# Patient Record
Sex: MALE | Race: BLACK OR AFRICAN AMERICAN | NOT HISPANIC OR LATINO | Employment: OTHER | URBAN - METROPOLITAN AREA
[De-identification: names, ages, dates, MRNs, and addresses within clinical notes are randomized per-mention and may not be internally consistent; named-entity substitution may affect disease eponyms.]

---

## 2017-02-13 ENCOUNTER — OFFICE VISIT (OUTPATIENT)
Dept: NEUROSURGERY | Facility: CLINIC | Age: 57
End: 2017-02-13

## 2017-02-13 VITALS
DIASTOLIC BLOOD PRESSURE: 90 MMHG | HEIGHT: 69 IN | SYSTOLIC BLOOD PRESSURE: 118 MMHG | BODY MASS INDEX: 41.18 KG/M2 | WEIGHT: 278 LBS | TEMPERATURE: 98 F

## 2017-02-13 DIAGNOSIS — M51.36 DEGENERATIVE LUMBAR DISC: Primary | ICD-10-CM

## 2017-02-13 PROCEDURE — 99203 OFFICE O/P NEW LOW 30 MIN: CPT | Performed by: NEUROLOGICAL SURGERY

## 2017-02-13 RX ORDER — LISINOPRIL 20 MG/1
20 TABLET ORAL 2 TIMES DAILY
COMMUNITY
Start: 2016-11-13

## 2017-02-13 RX ORDER — METHOCARBAMOL 750 MG/1
750 TABLET, FILM COATED ORAL NIGHTLY
Qty: 30 TABLET | Refills: 0 | Status: SHIPPED | OUTPATIENT
Start: 2017-02-13 | End: 2017-03-15

## 2017-02-13 RX ORDER — OMEPRAZOLE 20 MG/1
20 CAPSULE, DELAYED RELEASE ORAL DAILY
COMMUNITY

## 2017-02-13 RX ORDER — HYDROCHLOROTHIAZIDE 25 MG/1
25 TABLET ORAL DAILY
COMMUNITY
Start: 2017-01-20

## 2017-02-13 RX ORDER — NABUMETONE 750 MG/1
750 TABLET, FILM COATED ORAL 2 TIMES DAILY
Qty: 60 TABLET | Refills: 0 | Status: SHIPPED | OUTPATIENT
Start: 2017-02-13 | End: 2017-03-20 | Stop reason: SDUPTHER

## 2017-02-13 NOTE — PATIENT INSTRUCTIONS
Call Dr. Figueroa on a Monday or Tuesday, and leave a message with Nancy (). Dr. Figueroa will call you back at the end of the day as soon as he can.

## 2017-02-13 NOTE — PROGRESS NOTES
Bonilla Sterling .  1960  [id number]      Chief Complaint   Patient presents with   • Back Pain     CT       HISTORY OF PRESENT ILLNESS:  [This is a 56-year-old male who presents with a 5-6 year history of lower lumbar pain.  The pain is predominantly axial and only rarely does he have any suggestion of radiculopathy.  He does not have neurogenic claudication.  He has been to chiropractic mobilization which helps considerably.  Physical therapy has not been of any benefit to him.  He has seen a neurosurgeon at [facility] who suggested he have a spinal fusion.  I'm seeing him for a second opinion.  Today he says his pain is been as good as it has been for years.  He has been virtually asymptomatic for the past 3 weeks.  The pain is accentuated as he is gained weight and increases his activity. ]    Past Medical History   Diagnosis Date   • Hypertension        Past Surgical History   Procedure Laterality Date   • Wrist fracture surgery         Family History   Problem Relation Age of Onset   • Diabetes Mother    • Cancer Mother    • Hypertension Mother    • Diabetes Father    • Hypertension Father        Social History     Social History   • Marital status:      Spouse name: N/A   • Number of children: N/A   • Years of education: N/A     Occupational History   • Not on file.     Social History Main Topics   • Smoking status: Former Smoker   • Smokeless tobacco: Not on file   • Alcohol use No   • Drug use: No   • Sexual activity: Defer     Other Topics Concern   • Not on file     Social History Narrative       No Known Allergies      Current Outpatient Prescriptions:   •  hydrochlorothiazide (HYDRODIURIL) 25 MG tablet, , Disp: , Rfl:   •  lisinopril (PRINIVIL,ZESTRIL) 20 MG tablet, , Disp: , Rfl:   •  omeprazole (priLOSEC) 20 MG capsule, Take 20 mg by mouth Daily., Disp: , Rfl:     Review of Systems   Constitutional: Negative for activity change, appetite change, chills, diaphoresis, fatigue,  fever and unexpected weight change.   HENT: Positive for sinus pressure. Negative for congestion, dental problem, drooling, ear discharge, ear pain, facial swelling, hearing loss, mouth sores, nosebleeds, postnasal drip, rhinorrhea, sneezing, sore throat, tinnitus, trouble swallowing and voice change.    Eyes: Negative for photophobia, pain, discharge, redness, itching and visual disturbance.   Respiratory: Negative for apnea, cough, choking, chest tightness, shortness of breath, wheezing and stridor.    Cardiovascular: Negative for chest pain, palpitations and leg swelling.   Gastrointestinal: Negative for abdominal distention, abdominal pain, anal bleeding, blood in stool, constipation, diarrhea, nausea, rectal pain and vomiting.   Endocrine: Negative for cold intolerance, heat intolerance, polydipsia, polyphagia and polyuria.   Genitourinary: Negative for decreased urine volume, difficulty urinating, dysuria, enuresis, flank pain, frequency, genital sores, hematuria and urgency.   Musculoskeletal: Positive for back pain. Negative for arthralgias, gait problem, joint swelling, myalgias, neck pain and neck stiffness.   Skin: Negative for color change, pallor, rash and wound.   Allergic/Immunologic: Negative for environmental allergies, food allergies and immunocompromised state.   Neurological: Positive for headaches. Negative for dizziness, tremors, seizures, syncope, facial asymmetry, speech difficulty, weakness, light-headedness and numbness.   Hematological: Negative for adenopathy. Does not bruise/bleed easily.   Psychiatric/Behavioral: Negative for agitation, behavioral problems, confusion, decreased concentration, dysphoric mood, hallucinations, self-injury, sleep disturbance and suicidal ideas. The patient is not nervous/anxious and is not hyperactive.    All other systems reviewed and are negative.      Neurological Examination:    Vitals:    02/13/17 1226   BP: 118/90   BP Location: Right arm   Patient  "Position: Sitting   Cuff Size: Adult   Temp: 98 °F (36.7 °C)   TempSrc: Temporal Artery    Weight: 278 lb (80.7 kg)   Height: 69\" (175.3 cm)       Mental status/speech: The patient is alert and oriented.  Speech is clear without aphysia or dysarthria.  No overt cognitive deficits.    Cranial nerve examination:    Olfaction: Smell is intact.  Vision: Vision is intact without visual field abnormalities.  Funduscopic examination is normal.  No pupillary irregularity.  Ocular motor examination: The extraocular muscles are intact.  There is no diplopia.  The pupil is round and reactive to both light and accommodation.  There is no nystagmus.  Facial movement/sensation: There is no facial weakness.  Sensation is intact in the first, second, and third divisions of the trigeminal nerve.  The corneal reflex is intact.  Auditory: Hearing is intact to finger rub bilaterally.  Cranial nerves IX, X, XI, XII: Phonation is normal.  No dysphagia.  Tongue is protruded in the midline without atrophy.  The gag reflex is intact.  Shoulder shrug is normal.    Musculoligamentous ligamentous examination: He is obese with limitation of range of motion.  Tray leg raising [place] and flip test are all negative.  I am unable to document any weakness, sensory loss or reflex asymmetry.  His gait is normal.    Medical Decision Making:     Diagnostic Data Set:  Lumbar MRI shows degenerative disc disease L4/5 with moderate lateral recess closure secondary to facet hypertrophy.  He has more impressive disc degeneration L5-S1.  Neuroforamen appear to be more patent at this level.      Assessment:  Degenerative osteoarthritis with lateral recess stenosis L4/5          Recommendations:  I'm unable to find indication for surgical intervention.  He has been told he needs a spinal fusion yet I find no indication for that.  The pain is axial and nonradicular.  He does not have persistent neurogenic claudication.  Therefore I do not think an operation is " going to be meaningful and provide the relief that he seeking.  This is been long-standing over the past 15 years therefore the I do not see a rush towards surgery because of the absence of neurogenic claudication and neurological dysfunction.  I have recommended that he continue with chiropractic mobilization.  I provided a prescription of Relafen 750 mg twice a day and Robaxin 750 mg at night.  I've asked him to call me in 2-3 weeks.  At that time if he is not better I would recommend facet block or epidural steroid injection.  Obviously weight reduction will be key and pivotal to his improvement.  I will follow through with this if he wishes and if I can help further do not hesitate to contact me        I greatly appreciate the opportunity to see and evaluate this individual.  If you have questions or concerns regarding issues that I may have overlooked please call me at any time: [phone].  Shaji Figueroa M.D.  [facility]  [address] [place] [address]    Scribed for Lloyd Figueroa MD by Gerard Oden CMA. 2/13/2017  12:56 PM       I have read and concur with the information provided by the scribe.  Lloyd Figueroa MD

## 2017-03-20 DIAGNOSIS — M51.36 DEGENERATIVE LUMBAR DISC: ICD-10-CM

## 2017-03-20 RX ORDER — NABUMETONE 750 MG/1
TABLET, FILM COATED ORAL
Qty: 60 TABLET | Refills: 0 | OUTPATIENT
Start: 2017-03-20

## 2017-03-20 RX ORDER — METHOCARBAMOL 750 MG/1
750 TABLET, FILM COATED ORAL NIGHTLY
Qty: 30 TABLET | Refills: 0 | Status: SHIPPED | OUTPATIENT
Start: 2017-03-20 | End: 2017-04-17 | Stop reason: SDUPTHER

## 2017-03-20 RX ORDER — NABUMETONE 750 MG/1
750 TABLET, FILM COATED ORAL 2 TIMES DAILY
Qty: 60 TABLET | Refills: 0 | Status: SHIPPED | OUTPATIENT
Start: 2017-03-20 | End: 2017-04-17 | Stop reason: SDUPTHER

## 2017-03-20 NOTE — TELEPHONE ENCOUNTER
Provider:  Henry  Caller: Interface  Time of call:    Phone #:  automated   Surgery:  n/a  Surgery Date:  n/a  Last visit:   2/13/17    CARROLL:         Reason for call:       Automated refill

## 2017-03-20 NOTE — TELEPHONE ENCOUNTER
Provider:  Henry  Caller: Patient  Time of call:   2:29  Phone #: [phone]   Surgery:  no  Surgery Date:    Last visit:   02-13-17    CARROLL:         Reason for call:     Patient said Dr. Figueroa wanted him to call back to let him know how Relafen and Robaxin are doing.  He said he is doing great with them and would like a refill.

## 2017-04-17 DIAGNOSIS — M51.36 DEGENERATIVE LUMBAR DISC: ICD-10-CM

## 2017-04-18 RX ORDER — METHOCARBAMOL 750 MG/1
TABLET, FILM COATED ORAL
Qty: 30 TABLET | Refills: 0 | Status: SHIPPED | OUTPATIENT
Start: 2017-04-18 | End: 2017-05-14 | Stop reason: SDUPTHER

## 2017-04-18 RX ORDER — NABUMETONE 750 MG/1
TABLET, FILM COATED ORAL
Qty: 60 TABLET | Refills: 0 | Status: SHIPPED | OUTPATIENT
Start: 2017-04-18 | End: 2017-05-14 | Stop reason: SDUPTHER

## 2017-05-14 DIAGNOSIS — M51.36 DEGENERATIVE LUMBAR DISC: ICD-10-CM

## 2017-05-15 RX ORDER — METHOCARBAMOL 750 MG/1
TABLET, FILM COATED ORAL
Qty: 30 TABLET | Refills: 0 | Status: SHIPPED | OUTPATIENT
Start: 2017-05-15 | End: 2017-06-11 | Stop reason: SDUPTHER

## 2017-05-15 RX ORDER — NABUMETONE 750 MG/1
TABLET, FILM COATED ORAL
Qty: 60 TABLET | Refills: 2 | Status: SHIPPED | OUTPATIENT
Start: 2017-05-15 | End: 2017-08-17 | Stop reason: SDUPTHER

## 2017-06-12 RX ORDER — METHOCARBAMOL 750 MG/1
TABLET, FILM COATED ORAL
Qty: 30 TABLET | Refills: 0 | Status: SHIPPED | OUTPATIENT
Start: 2017-06-12 | End: 2017-07-13 | Stop reason: SDUPTHER

## 2017-06-12 NOTE — TELEPHONE ENCOUNTER
Provider:  Henry  Caller: lee  Last visit:   2/13/17  Next visit:    no    Reason for call:         Automated refill request

## 2017-07-13 DIAGNOSIS — M51.36 DDD (DEGENERATIVE DISC DISEASE), LUMBAR: Primary | ICD-10-CM

## 2017-07-13 RX ORDER — METHOCARBAMOL 750 MG/1
TABLET, FILM COATED ORAL
Qty: 30 TABLET | Refills: 0 | Status: SHIPPED | OUTPATIENT
Start: 2017-07-13

## 2017-07-13 NOTE — TELEPHONE ENCOUNTER
Message left on clinical line that the phone number [phone] is not the correct number for Mr. Sterling. I tried the work number and the voice message said Temi Peguero so I did not leave a VM.   If the pt calls, please get updated phone number.

## 2017-07-13 NOTE — TELEPHONE ENCOUNTER
Provider: Henry  Pharmacy: [facility]  Last visit: 02/13/17  Next visit: none scheduled    DX- DDD lumbar      Reason for call:   Automated refill request from patient's pharmacy      Do you want to continue this med? Wrote last on 06/11/17 for 30 days 1 QHS

## 2017-07-13 NOTE — TELEPHONE ENCOUNTER
Called pt, no answer. Left message on VM to call the office. Need to tell him to get refills from his PCP

## 2017-08-14 DIAGNOSIS — M51.36 DDD (DEGENERATIVE DISC DISEASE), LUMBAR: ICD-10-CM

## 2017-08-14 DIAGNOSIS — M51.36 DEGENERATIVE LUMBAR DISC: ICD-10-CM

## 2017-08-14 RX ORDER — METHOCARBAMOL 750 MG/1
TABLET, FILM COATED ORAL
Qty: 30 TABLET | Refills: 0 | OUTPATIENT
Start: 2017-08-14

## 2017-08-14 RX ORDER — NABUMETONE 750 MG/1
TABLET, FILM COATED ORAL
Qty: 60 TABLET | Refills: 2 | OUTPATIENT
Start: 2017-08-14

## 2017-08-14 NOTE — TELEPHONE ENCOUNTER
Provider: Henry  Pharmacy: [facility]  Last visit: 02/13/17  Next visit: none scheduled    DX- DDD lumbar     Reason for call:       Automated refill request from patient's pharmacy    Last message says for patient to get further refills on the Nabumetone and Methocarbamol from PCP. It looks like we tried to contact him but were unsuccessful.     Called pt's home #, left detailed voicemail adving pt that he would need to get further refills on both of these from his PCP.

## 2017-08-15 DIAGNOSIS — M51.36 DDD (DEGENERATIVE DISC DISEASE), LUMBAR: ICD-10-CM

## 2017-08-15 RX ORDER — METHOCARBAMOL 750 MG/1
TABLET, FILM COATED ORAL
Qty: 30 TABLET | Refills: 0 | OUTPATIENT
Start: 2017-08-15

## 2017-08-15 NOTE — TELEPHONE ENCOUNTER
Provider:  Henry / Radha Peterson PA-C  Caller:  Auto refill request  Time of call:     Phone #:    Surgery:  NA  Surgery Date:    Last visit:   02/13/17  Next visit: NA      Reason for call:         Automated refill request.

## 2017-08-16 RX ORDER — METHOCARBAMOL 750 MG/1
TABLET, FILM COATED ORAL
Qty: 30 TABLET | Refills: 0 | Status: SHIPPED | OUTPATIENT
Start: 2017-08-16 | End: 2018-09-01

## 2017-08-17 DIAGNOSIS — M51.36 DDD (DEGENERATIVE DISC DISEASE), LUMBAR: ICD-10-CM

## 2017-08-17 DIAGNOSIS — M51.36 DEGENERATIVE LUMBAR DISC: ICD-10-CM

## 2017-08-17 RX ORDER — NABUMETONE 750 MG/1
TABLET, FILM COATED ORAL
Qty: 60 TABLET | Refills: 2 | Status: SHIPPED | OUTPATIENT
Start: 2017-08-17

## 2017-08-17 RX ORDER — METHOCARBAMOL 750 MG/1
TABLET, FILM COATED ORAL
Qty: 30 TABLET | Refills: 0 | OUTPATIENT
Start: 2017-08-17

## 2017-08-17 NOTE — TELEPHONE ENCOUNTER
Called pt to let him know that we did provide one final refill and that he would need to get further refills on the Relafen and Robaxin from PCP, left detailed vm.

## 2017-08-17 NOTE — TELEPHONE ENCOUNTER
Provider:  Henry  Pharmacy: [facility]  Last visit:   02/13/17  Next visit: n/a      Reason for call:       Automated refill request from patient's pharmacy     (pt also called in requesting refill on the medications: Relafen and Robaxin, however it looks like we recently refill the Robaxin)    Called [facility] to find out when exactly Robaxin was filled last. Pharmacist said they received a refill for it yesterday, however he is due for refill on the Relafen.

## 2017-08-21 ENCOUNTER — TELEPHONE (OUTPATIENT)
Dept: NEUROSURGERY | Facility: CLINIC | Age: 57
End: 2017-08-21

## 2017-08-21 DIAGNOSIS — M51.36 DDD (DEGENERATIVE DISC DISEASE), LUMBAR: ICD-10-CM

## 2017-08-21 RX ORDER — METHOCARBAMOL 750 MG/1
TABLET, FILM COATED ORAL
Qty: 30 TABLET | Refills: 0 | OUTPATIENT
Start: 2017-08-21

## 2017-08-21 NOTE — TELEPHONE ENCOUNTER
Provider:  Henry  Caller: Bonilla Sterling Jr.  Time of call:   08:58 AM  Phone #:  [phone]  Last visit:   02/13/17  Next visit: PRN       Reason for call:       Pt called in and left vm requesting refills on Relafen and Robaxin. These were both refill on 08/16 & 08/17 and e-scribed to his [facility] pharmacy and a vm was left with the patient that he needs to get further refills from his PCP    Called pt back to advise of this again, went straight to voicemail. Left another detailed vm advising pt that these meds were both recently refilled and sent to his [facility] pharmacy and that further refill requests will need to be sent to his PCP.

## 2017-08-21 NOTE — TELEPHONE ENCOUNTER
A willie by the name of Panchito Mckee called and asked that we remove his phone number from Bonilla Sterling's file.  He has never heard of this patient.  Phone number has been deleted.

## 2017-09-11 RX ORDER — METHOCARBAMOL 750 MG/1
TABLET, FILM COATED ORAL
Qty: 30 TABLET | Refills: 0 | OUTPATIENT
Start: 2017-09-11

## 2018-09-01 ENCOUNTER — OFFICE VISIT (OUTPATIENT)
Dept: RETAIL CLINIC | Facility: CLINIC | Age: 58
End: 2018-09-01

## 2018-09-01 VITALS
OXYGEN SATURATION: 98 % | RESPIRATION RATE: 18 BRPM | BODY MASS INDEX: 40.76 KG/M2 | DIASTOLIC BLOOD PRESSURE: 78 MMHG | SYSTOLIC BLOOD PRESSURE: 120 MMHG | TEMPERATURE: 98.5 F | HEART RATE: 100 BPM | WEIGHT: 276 LBS

## 2018-09-01 DIAGNOSIS — J20.9 ACUTE BRONCHITIS, UNSPECIFIED ORGANISM: Primary | ICD-10-CM

## 2018-09-01 DIAGNOSIS — J01.00 ACUTE NON-RECURRENT MAXILLARY SINUSITIS: ICD-10-CM

## 2018-09-01 PROCEDURE — 99203 OFFICE O/P NEW LOW 30 MIN: CPT | Performed by: NURSE PRACTITIONER

## 2018-09-01 RX ORDER — CEFDINIR 300 MG/1
300 CAPSULE ORAL 2 TIMES DAILY
Qty: 20 CAPSULE | Refills: 0 | Status: SHIPPED | OUTPATIENT
Start: 2018-09-01 | End: 2018-09-11

## 2018-09-01 RX ORDER — BENZONATATE 200 MG/1
200 CAPSULE ORAL 3 TIMES DAILY PRN
Qty: 21 CAPSULE | Refills: 0 | Status: SHIPPED | OUTPATIENT
Start: 2018-09-01 | End: 2018-09-08

## 2018-09-01 RX ORDER — CETIRIZINE HYDROCHLORIDE 10 MG/1
10 TABLET ORAL DAILY
COMMUNITY

## 2018-09-01 RX ORDER — ACETAMINOPHEN 500 MG
500 TABLET ORAL EVERY 6 HOURS PRN
COMMUNITY

## 2018-09-01 NOTE — PROGRESS NOTES
Sinusitis      Subjective   Bonilla Sterling Jr. is a 58 y.o. male.     Sinusitis   This is a new problem. Episode onset: wednesday  The problem has been gradually worsening since onset. There has been no fever. Associated symptoms include congestion, coughing, ear pain (when cleaning ears ) and a sore throat. Pertinent negatives include no chills, diaphoresis, headaches, shortness of breath or sinus pressure. Treatments tried: Started Nasonex today; Zyrtec Daily  The treatment provided no relief.    Vaccines UTD.  See ROS.     Patient Active Problem List   Diagnosis   • Hypertension   • Gastroesophageal reflux disease without esophagitis   • Bulging of lumbar intervertebral disc       No Known Allergies     Current Outpatient Prescriptions on File Prior to Visit   Medication Sig Dispense Refill   • hydrochlorothiazide (HYDRODIURIL) 25 MG tablet      • lisinopril (PRINIVIL,ZESTRIL) 20 MG tablet      • methocarbamol (ROBAXIN) 750 MG tablet TAKE ONE TABLET BY MOUTH IN THE EVENING FOR 30 DAYS 30 tablet 0   • nabumetone (RELAFEN) 750 MG tablet TAKE ONE TABLET BY MOUTH TWICE DAILY 60 tablet 2   • omeprazole (priLOSEC) 20 MG capsule Take 20 mg by mouth Daily.     • [DISCONTINUED] methocarbamol (ROBAXIN) 750 MG tablet TAKE ONE TABLET BY MOUTH IN THE EVENING FOR 30 DAYS 30 tablet 0     No current facility-administered medications on file prior to visit.        Past Medical History:   Diagnosis Date   • Acid reflux    • Chronic pain disorder     Back    • Hypertension        Past Surgical History:   Procedure Laterality Date   • WRIST FRACTURE SURGERY Right        Family History   Problem Relation Age of Onset   • Diabetes Mother    • Hypertension Mother    • Breast cancer Mother    • Diabetes Father    • Hypertension Father    • No Known Problems Sister    • No Known Problems Brother    • No Known Problems Sister    • No Known Problems Sister    • No Known Problems Sister    • No Known Problems Sister    • No Known Problems  Brother    • No Known Problems Brother        Social History     Social History   • Marital status:      Spouse name: N/A   • Number of children: N/A   • Years of education: N/A     Occupational History   • Not on file.     Social History Main Topics   • Smoking status: Former Smoker     Packs/day: 1.00     Years: 20.00     Types: Cigarettes     Quit date: 2009   • Smokeless tobacco: Never Used   • Alcohol use No      Comment: quit 10 years ago    • Drug use: No   • Sexual activity: Defer     Other Topics Concern   • Not on file     Social History Narrative   • No narrative on file       Travel:  No recent travel within the last 21 days outside the [place] Denies recent travel to one of the following [place]  Countries:  [place], [place], [place], or [place].  Denies contact with anyone who has traveled to one of the following [place]  Countries: [place], [place], [place], or [place] within the last 21 days and is known or suspected to have Ebola.  Denies having had any contact with the human remains, blood or any bodily fluids of someone who is known or suspected to have Ebola within the last 21 days.     Review of Systems   Constitutional: Positive for fever (tactile ). Negative for chills and diaphoresis.   HENT: Positive for congestion, ear pain (when cleaning ears ), postnasal drip, rhinorrhea, sore throat and voice change (hoarseness ). Negative for ear discharge, mouth sores, nosebleeds, sinus pain and sinus pressure.    Respiratory: Positive for cough. Negative for shortness of breath and wheezing.    Gastrointestinal: Positive for nausea (Thursday and Friday). Negative for abdominal pain, diarrhea and vomiting.   Musculoskeletal: Negative for myalgias.   Skin: Negative for rash.   Neurological: Negative for dizziness and headaches.       /78 (BP Location: Right arm, Patient Position: Sitting, Cuff Size: Large Adult)   Pulse 100   Temp 98.5 °F (36.9 °C) (Temporal Artery )   Resp 18    Wt 125 kg (276 lb)   SpO2 98%   BMI 40.76 kg/m²     Objective   Physical Exam   Constitutional: He is oriented to person, place, and time. He appears well-developed and well-nourished. He is cooperative. He appears ill. No distress.   HENT:   Head: Normocephalic.   Right Ear: Tympanic membrane, external ear and ear canal normal.   Left Ear: Tympanic membrane and external ear normal.   Nose: Right sinus exhibits no maxillary sinus tenderness and no frontal sinus tenderness. Left sinus exhibits maxillary sinus tenderness. Left sinus exhibits no frontal sinus tenderness.   Mouth/Throat: Uvula is midline, oropharynx is clear and moist and mucous membranes are normal. No posterior oropharyngeal edema or posterior oropharyngeal erythema. Tonsils are 1+ on the right. Tonsils are 1+ on the left. No tonsillar exudate.   Flaky dry skin left auditory canal    Cardiovascular: Normal rate, regular rhythm and normal heart sounds.    Pulmonary/Chest: Effort normal and breath sounds normal.   Lymphadenopathy:        Head (right side): Tonsillar adenopathy present. No preauricular and no posterior auricular adenopathy present.        Head (left side): Tonsillar adenopathy present. No preauricular and no posterior auricular adenopathy present.     He has no cervical adenopathy.   Neurological: He is alert and oriented to person, place, and time.   Skin: Skin is warm, dry and intact. No rash noted.       Assessment/Plan   Hamilton was seen today for sinusitis.    Diagnoses and all orders for this visit:    Acute bronchitis, unspecified organism  -     cefdinir (OMNICEF) 300 MG capsule; Take 1 capsule by mouth 2 (Two) Times a Day for 10 days.  -     benzonatate (TESSALON) 200 MG capsule; Take 1 capsule by mouth 3 (Three) Times a Day As Needed for Cough for up to 7 days.    Acute non-recurrent maxillary sinusitis  -     cefdinir (OMNICEF) 300 MG capsule; Take 1 capsule by mouth 2 (Two) Times a Day for 10 days.    Continue using the  Nasonex and drinking 7-8 bottles of water.  Rest.  Tylenol and or motrin for pain.  Follow up with PCP if symptoms persist or do not improve.  Patient verbalized understanding of instructions given.  VS and PE findings discussed.  Visit summary provided.  Questions/concerns addressed.      No results found for this or any previous visit.    Return if symptoms worsen or fail to improve.

## 2018-09-01 NOTE — PATIENT INSTRUCTIONS
Acute Bronchitis, Adult  Acute bronchitis is sudden (acute) swelling of the air tubes (bronchi) in the lungs. Acute bronchitis causes these tubes to fill with mucus, which can make it hard to breathe. It can also cause coughing or wheezing.  In adults, acute bronchitis usually goes away within 2 weeks. A cough caused by bronchitis may last up to 3 weeks. Smoking, allergies, and asthma can make the condition worse. Repeated episodes of bronchitis may cause further lung problems, such as chronic obstructive pulmonary disease (COPD).  What are the causes?  This condition can be caused by germs and by substances that irritate the lungs, including:  · Cold and flu viruses. This condition is most often caused by the same virus that causes a cold.  · Bacteria.  · Exposure to tobacco smoke, dust, fumes, and air pollution.    What increases the risk?  This condition is more likely to develop in people who:  · Have close contact with someone with acute bronchitis.  · Are exposed to lung irritants, such as tobacco smoke, dust, fumes, and vapors.  · Have a weak immune system.  · Have a respiratory condition such as asthma.    What are the signs or symptoms?  Symptoms of this condition include:  · A cough.  · Coughing up clear, yellow, or green mucus.  · Wheezing.  · Chest congestion.  · Shortness of breath.  · A fever.  · Body aches.  · Chills.  · A sore throat.    How is this diagnosed?  This condition is usually diagnosed with a physical exam. During the exam, your health care provider may order tests, such as chest X-rays, to rule out other conditions. He or she may also:  · Test a sample of your mucus for bacterial infection.  · Check the level of oxygen in your blood. This is done to check for pneumonia.  · Do a chest X-ray or lung function testing to rule out pneumonia and other conditions.  · Perform blood tests.    Your health care provider will also ask about your symptoms and medical history.  How is this  treated?  Most cases of acute bronchitis clear up over time without treatment. Your health care provider may recommend:  · Drinking more fluids. Drinking more makes your mucus thinner, which may make it easier to breathe.  · Taking a medicine for a fever or cough.  · Taking an antibiotic medicine.  · Using an inhaler to help improve shortness of breath and to control a cough.  · Using a cool mist vaporizer or humidifier to make it easier to breathe.    Follow these instructions at home:  Medicines  · Take over-the-counter and prescription medicines only as told by your health care provider.  · If you were prescribed an antibiotic, take it as told by your health care provider. Do not stop taking the antibiotic even if you start to feel better.  General instructions  · Get plenty of rest.  · Drink enough fluids to keep your urine clear or pale yellow.  · Avoid smoking and secondhand smoke. Exposure to cigarette smoke or irritating chemicals will make bronchitis worse. If you smoke and you need help quitting, ask your health care provider. Quitting smoking will help your lungs heal faster.  · Use an inhaler, cool mist vaporizer, or humidifier as told by your health care provider.  · Keep all follow-up visits as told by your health care provider. This is important.  How is this prevented?  To lower your risk of getting this condition again:  · Wash your hands often with soap and water. If soap and water are not available, use hand .  · Avoid contact with people who have cold symptoms.  · Try not to touch your hands to your mouth, nose, or eyes.  · Make sure to get the flu shot every year.    Contact a health care provider if:  · Your symptoms do not improve in 2 weeks of treatment.  Get help right away if:  · You cough up blood.  · You have chest pain.  · You have severe shortness of breath.  · You become dehydrated.  · You faint or keep feeling like you are going to faint.  · You keep vomiting.  · You have a  severe headache.  · Your fever or chills gets worse.  This information is not intended to replace advice given to you by your health care provider. Make sure you discuss any questions you have with your health care provider.  Document Released: 01/25/2006 Document Revised: 07/12/2017 Document Reviewed: 06/07/2017  [employer]vier Interactive Patient Education © 2018 [employer]

## 2020-07-16 RX ORDER — ATORVASTATIN CALCIUM 20 MG/1
20 TABLET, FILM COATED ORAL DAILY
COMMUNITY

## 2020-07-16 RX ORDER — ALLOPURINOL 100 MG/1
100 TABLET ORAL DAILY
COMMUNITY

## 2020-07-16 RX ORDER — VENLAFAXINE HYDROCHLORIDE 37.5 MG/1
37.5 CAPSULE, EXTENDED RELEASE ORAL DAILY
COMMUNITY

## 2020-07-22 ENCOUNTER — OFFICE VISIT (OUTPATIENT)
Dept: GASTROENTEROLOGY | Facility: CLINIC | Age: 60
End: 2020-07-22

## 2020-07-22 VITALS
HEIGHT: 69 IN | WEIGHT: 284 LBS | HEART RATE: 98 BPM | DIASTOLIC BLOOD PRESSURE: 94 MMHG | BODY MASS INDEX: 42.06 KG/M2 | SYSTOLIC BLOOD PRESSURE: 149 MMHG | TEMPERATURE: 97.8 F | RESPIRATION RATE: 16 BRPM

## 2020-07-22 DIAGNOSIS — Z01.812 PRE-PROCEDURE LAB EXAM: ICD-10-CM

## 2020-07-22 DIAGNOSIS — Z12.11 COLON CANCER SCREENING: Primary | ICD-10-CM

## 2020-07-22 DIAGNOSIS — Z01.812 PRE-PROCEDURE LAB EXAM: Primary | ICD-10-CM

## 2020-07-22 DIAGNOSIS — R12 HEARTBURN: ICD-10-CM

## 2020-07-22 PROCEDURE — S0260 H&P FOR SURGERY: HCPCS | Performed by: NURSE PRACTITIONER

## 2020-07-22 RX ORDER — BISACODYL 5 MG/1
TABLET, DELAYED RELEASE ORAL
Qty: 4 TABLET | Refills: 0 | Status: SHIPPED | OUTPATIENT
Start: 2020-07-22 | End: 2020-09-14

## 2020-07-22 RX ORDER — AMITRIPTYLINE HYDROCHLORIDE 10 MG/1
10 TABLET, FILM COATED ORAL NIGHTLY
COMMUNITY

## 2020-07-22 RX ORDER — ASCORBIC ACID 500 MG
500 TABLET ORAL DAILY
COMMUNITY

## 2020-07-22 RX ORDER — SODIUM CHLORIDE 9 MG/ML
70 INJECTION, SOLUTION INTRAVENOUS CONTINUOUS PRN
Status: CANCELLED | OUTPATIENT
Start: 2020-08-17

## 2020-07-22 NOTE — PROGRESS NOTES
"     New Patient Consult      Date: 2020   Patient Name: Bonilla Sterling Jr.  MRN: [id number]  : 1960     Primary Care Provider: Christin Yeh APRN    Chief Complaint   Patient presents with   • Colon Cancer Screening     History of Present Illness: Bonilla Sterling Jr. is a 60 y.o. male who is here today to establish care with Gastroenterology for colon cancer screening.    The patient denies recent change in bowel habits. There is no diarrhea or constipation. There is no history of abdominal pain. There is no history of overt GI bleed (hematemesis melena or hematochezia). The patient denies nausea or vomiting. The patient has a long standing history of heartburn that is well controlled with Omeprazole 20 mg.. The patient denies dysphagia or odynophagia. There is no history of recent significant weight loss. There is no history of liver disease in the past. There is no family history of colon cancer. The patient's last colonoscopy was in  and was \"normal\" per patient.    Subjective      Past Medical History:   Diagnosis Date   • Acid reflux    • Anxiety    • Chronic pain disorder     Back    • Gout    • Hypertension    • Low back pain      Past Surgical History:   Procedure Laterality Date   • COLONOSCOPY     • WRIST FRACTURE SURGERY Right      Family History   Problem Relation Age of Onset   • Diabetes Mother    • Hypertension Mother    • Breast cancer Mother    • Diabetes Father    • Hypertension Father    • No Known Problems Sister    • No Known Problems Brother    • No Known Problems Sister    • No Known Problems Sister    • No Known Problems Sister    • No Known Problems Sister    • No Known Problems Brother    • No Known Problems Brother    • Colon cancer Neg Hx      Social History     Socioeconomic History   • Marital status:      Spouse name: Not on file   • Number of children: Not on file   • Years of education: Not on file   • Highest education level: Not on file "   Tobacco Use   • Smoking status: Former Smoker     Packs/day: 1.00     Years: 20.00     Pack years: 20.00     Types: Cigarettes     Last attempt to quit: 2009     Years since quittin.5   • Smokeless tobacco: Never Used   Substance and Sexual Activity   • Alcohol use: No     Comment: quit 10 years ago    • Drug use: No   • Sexual activity: Defer       Current Outpatient Medications:   •  acetaminophen (TYLENOL) 500 MG tablet, Take 500 mg by mouth Every 6 (Six) Hours As Needed for Mild Pain ., Disp: , Rfl:   •  allopurinol (ZYLOPRIM) 100 MG tablet, Take 100 mg by mouth Daily., Disp: , Rfl:   •  amitriptyline (ELAVIL) 10 MG tablet, Take 10 mg by mouth Every Night., Disp: , Rfl:   •  atorvastatin (LIPITOR) 20 MG tablet, Take 20 mg by mouth Daily., Disp: , Rfl:   •  cetirizine (zyrTEC) 10 MG tablet, Take 10 mg by mouth Daily., Disp: , Rfl:   •  hydrochlorothiazide (HYDRODIURIL) 25 MG tablet, , Disp: , Rfl:   •  lisinopril (PRINIVIL,ZESTRIL) 20 MG tablet, Take 20 mg by mouth 2 (two) times a day., Disp: , Rfl:   •  methocarbamol (ROBAXIN) 750 MG tablet, TAKE ONE TABLET BY MOUTH IN THE EVENING FOR 30 DAYS (Patient taking differently: 2 (two) times a day.), Disp: 30 tablet, Rfl: 0  •  Multiple Vitamins-Minerals (CENTRUM SILVER PO), Take  by mouth Daily., Disp: , Rfl:   •  nabumetone (RELAFEN) 750 MG tablet, TAKE ONE TABLET BY MOUTH TWICE DAILY, Disp: 60 tablet, Rfl: 2  •  omeprazole (priLOSEC) 20 MG capsule, Take 20 mg by mouth Daily., Disp: , Rfl:   •  venlafaxine XR (EFFEXOR-XR) 37.5 MG 24 hr capsule, Take 37.5 mg by mouth Daily., Disp: , Rfl:   •  vitamin C (ASCORBIC ACID) 500 MG tablet, Take 500 mg by mouth Daily., Disp: , Rfl:   •  bisacodyl (DULCOLAX) 5 MG EC tablet, Take as directed for colon prep, Disp: 4 tablet, Rfl: 0  •  polyethylene glycol (GoLYTELY) 236 g solution, Starting at 5 pm on day prior to procedure, drink 8 ounces every 30 minutes as directed, Disp: 4000 mL, Rfl: 0    No Known Allergies    Review  of Systems   Constitutional: Negative for appetite change and unexpected weight change.   HENT: Negative for trouble swallowing.    Gastrointestinal: Negative for abdominal distention, abdominal pain, anal bleeding, blood in stool, constipation, diarrhea, nausea, rectal pain and vomiting.     The following portions of the patient's history were reviewed and updated as appropriate: allergies, current medications, past family history, past medical history, past social history, past surgical history and problem list.    Objective     Physical Exam   Constitutional: He is oriented to person, place, and time. He appears well-developed and well-nourished. No distress.   HENT:   Head: Normocephalic and atraumatic.   Right Ear: Hearing and external ear normal.   Left Ear: Hearing and external ear normal.   Nose: Nose normal.   Mouth/Throat: Oropharynx is clear and moist and mucous membranes are normal. Mucous membranes are not pale, not dry and not cyanotic. No oral lesions. No oropharyngeal exudate.   Eyes: Conjunctivae and EOM are normal. Right eye exhibits no discharge. Left eye exhibits no discharge.   Neck: Trachea normal. Neck supple. No JVD present. No edema present. No thyroid mass and no thyromegaly present.   Cardiovascular: Normal rate, regular rhythm, S2 normal and normal heart sounds. Exam reveals no gallop, no S3 and no friction rub.   No murmur heard.  Pulmonary/Chest: Effort normal and breath sounds normal. No respiratory distress. He exhibits no tenderness.   Abdominal: Normal appearance and bowel sounds are normal. He exhibits no distension, no ascites and no mass. There is no splenomegaly or hepatomegaly. There is no tenderness. There is no rigidity, no rebound and no guarding. No hernia.     Vascular Status -  His right foot exhibits no edema. His left foot exhibits no edema.  Lymphadenopathy:     He has no cervical adenopathy.        Left: No supraclavicular adenopathy present.   Neurological: He is  "alert and oriented to person, place, and time. He has normal strength. No cranial nerve deficit or sensory deficit.   Skin: No rash noted. He is not diaphoretic. No cyanosis. No pallor. Nails show no clubbing.   Psychiatric: He has a normal mood and affect.   Nursing note and vitals reviewed.    Vital Signs:   Vitals:    07/22/20 1317   BP: 149/94   Pulse: 98   Resp: 16   Temp: 97.8 °F (36.6 °C)   Weight: 129 kg (284 lb)   Height: 175.3 cm (69\")     No visits with results within 180 Day(s) from this visit.   Latest known visit with results is:   No results found for any previous visit.      Assessment / Plan      1. Colon cancer screening  The patient's last colonoscopy was in 2009 and was \"normal\". No family history of colon cancer.    - polyethylene glycol (GoLYTELY) 236 g solution; Starting at 5 pm on day prior to procedure, drink 8 ounces every 30 minutes as directed  Dispense: 4000 mL; Refill: 0  - bisacodyl (DULCOLAX) 5 MG EC tablet; Take as directed for colon prep  Dispense: 4 tablet; Refill: 0    2. Pre-procedure lab exam    3. Heartburn  Well controlled with Omeprazole.    Patient Instructions   1. 1. Antireflux measures: Avoid fried, fatty foods, alcohol, chocolate, coffee, tea,  soft drinks, peppermint and spearmint, spicy foods, tomatoes and tomato based foods, onion based foods, and smoking. Other antireflux measures include weight reduction if overweight, avoiding tight clothing around the abdomen, elevating the head of the bed 6 inches with blocks under the head board, and don't drink or eat before going to bed and avoid lying down immediately after meals.  2. Omeprazole 20 mg 1 by mouth in the am 30 minutes before breakfast.  3. High fiber, low fat diet with liberal water intake.   4. Colonoscopy: Description of the procedure, risks, benefits, alternatives and options, including nonoperative options, were discussed with the patient in detail. The patient understands and wishes to proceed.  5. " COVID-19 testing prior to procedure. The patient will need to self-quarantine after testing until the procedure. Instructions given to patient.     Ben Carter, APRN  7/22/2020    Please note that portions of this note may have been completed with a voice recognition program. Efforts were made to edit the dictations, but occasionally words are mistranscribed.

## 2020-07-22 NOTE — PATIENT INSTRUCTIONS
1. 1. Antireflux measures: Avoid fried, fatty foods, alcohol, chocolate, coffee, tea,  soft drinks, peppermint and spearmint, spicy foods, tomatoes and tomato based foods, onion based foods, and smoking. Other antireflux measures include weight reduction if overweight, avoiding tight clothing around the abdomen, elevating the head of the bed 6 inches with blocks under the head board, and don't drink or eat before going to bed and avoid lying down immediately after meals.  2. Omeprazole 20 mg 1 by mouth in the am 30 minutes before breakfast.  3. High fiber, low fat diet with liberal water intake.   4. Colonoscopy: Description of the procedure, risks, benefits, alternatives and options, including nonoperative options, were discussed with the patient in detail. The patient understands and wishes to proceed.  5. COVID-19 testing prior to procedure. The patient will need to self-quarantine after testing until the procedure. Instructions given to patient.

## 2020-07-29 PROBLEM — Z12.11 COLON CANCER SCREENING: Status: ACTIVE | Noted: 2020-07-29

## 2020-08-13 ENCOUNTER — LAB (OUTPATIENT)
Dept: LAB | Facility: HOSPITAL | Age: 60
End: 2020-08-13

## 2020-08-13 DIAGNOSIS — Z01.812 PRE-PROCEDURE LAB EXAM: ICD-10-CM

## 2020-08-13 PROCEDURE — U0004 COV-19 TEST NON-CDC HGH THRU: HCPCS

## 2020-08-13 PROCEDURE — C9803 HOPD COVID-19 SPEC COLLECT: HCPCS

## 2020-08-13 PROCEDURE — U0002 COVID-19 LAB TEST NON-CDC: HCPCS

## 2020-08-14 LAB
REF LAB TEST METHOD: NORMAL
SARS-COV-2 RNA RESP QL NAA+PROBE: NOT DETECTED

## 2020-08-17 ENCOUNTER — HOSPITAL ENCOUNTER (OUTPATIENT)
Facility: HOSPITAL | Age: 60
Setting detail: HOSPITAL OUTPATIENT SURGERY
Discharge: HOME | End: 2020-08-17
Attending: INTERNAL MEDICINE | Admitting: INTERNAL MEDICINE

## 2020-08-17 ENCOUNTER — ANESTHESIA EVENT (OUTPATIENT)
Dept: GASTROENTEROLOGY | Facility: HOSPITAL | Age: 60
End: 2020-08-17

## 2020-08-17 ENCOUNTER — ANESTHESIA (OUTPATIENT)
Dept: GASTROENTEROLOGY | Facility: HOSPITAL | Age: 60
End: 2020-08-17

## 2020-08-17 VITALS
WEIGHT: 284.25 LBS | RESPIRATION RATE: 16 BRPM | OXYGEN SATURATION: 98 % | HEIGHT: 69 IN | HEART RATE: 81 BPM | SYSTOLIC BLOOD PRESSURE: 119 MMHG | DIASTOLIC BLOOD PRESSURE: 90 MMHG | TEMPERATURE: 98 F | BODY MASS INDEX: 42.1 KG/M2

## 2020-08-17 DIAGNOSIS — Z12.11 COLON CANCER SCREENING: ICD-10-CM

## 2020-08-17 PROCEDURE — 25010000002 FENTANYL CITRATE (PF) 100 MCG/2ML SOLUTION: Performed by: NURSE ANESTHETIST, CERTIFIED REGISTERED

## 2020-08-17 PROCEDURE — 25010000002 PROPOFOL 10 MG/ML EMULSION: Performed by: NURSE ANESTHETIST, CERTIFIED REGISTERED

## 2020-08-17 PROCEDURE — 45378 DIAGNOSTIC COLONOSCOPY: CPT | Performed by: INTERNAL MEDICINE

## 2020-08-17 PROCEDURE — 25010000002 MIDAZOLAM PER 1MG: Performed by: NURSE ANESTHETIST, CERTIFIED REGISTERED

## 2020-08-17 RX ORDER — FENTANYL CITRATE 50 UG/ML
INJECTION, SOLUTION INTRAMUSCULAR; INTRAVENOUS AS NEEDED
Status: DISCONTINUED | OUTPATIENT
Start: 2020-08-17 | End: 2020-08-17 | Stop reason: SURG

## 2020-08-17 RX ORDER — PROPOFOL 10 MG/ML
VIAL (ML) INTRAVENOUS AS NEEDED
Status: DISCONTINUED | OUTPATIENT
Start: 2020-08-17 | End: 2020-08-17 | Stop reason: SURG

## 2020-08-17 RX ORDER — KETAMINE HCL IN NACL, ISO-OSM 100MG/10ML
SYRINGE (ML) INJECTION AS NEEDED
Status: DISCONTINUED | OUTPATIENT
Start: 2020-08-17 | End: 2020-08-17 | Stop reason: SURG

## 2020-08-17 RX ORDER — MIDAZOLAM HYDROCHLORIDE 2 MG/2ML
INJECTION, SOLUTION INTRAMUSCULAR; INTRAVENOUS AS NEEDED
Status: DISCONTINUED | OUTPATIENT
Start: 2020-08-17 | End: 2020-08-17 | Stop reason: SURG

## 2020-08-17 RX ORDER — SODIUM CHLORIDE 9 MG/ML
70 INJECTION, SOLUTION INTRAVENOUS CONTINUOUS PRN
Status: DISCONTINUED | OUTPATIENT
Start: 2020-08-17 | End: 2020-08-17 | Stop reason: HOSPADM

## 2020-08-17 RX ADMIN — Medication 25 MG: at 09:26

## 2020-08-17 RX ADMIN — PROPOFOL 20 MG: 10 INJECTION, EMULSION INTRAVENOUS at 09:32

## 2020-08-17 RX ADMIN — PROPOFOL 30 MG: 10 INJECTION, EMULSION INTRAVENOUS at 09:27

## 2020-08-17 RX ADMIN — PROPOFOL 20 MG: 10 INJECTION, EMULSION INTRAVENOUS at 09:36

## 2020-08-17 RX ADMIN — PROPOFOL 20 MG: 10 INJECTION, EMULSION INTRAVENOUS at 09:34

## 2020-08-17 RX ADMIN — PROPOFOL 20 MG: 10 INJECTION, EMULSION INTRAVENOUS at 09:38

## 2020-08-17 RX ADMIN — SODIUM CHLORIDE 70 ML/HR: 9 INJECTION, SOLUTION INTRAVENOUS at 08:52

## 2020-08-17 RX ADMIN — PROPOFOL 30 MG: 10 INJECTION, EMULSION INTRAVENOUS at 09:29

## 2020-08-17 RX ADMIN — MIDAZOLAM HYDROCHLORIDE 2 MG: 1 INJECTION, SOLUTION INTRAMUSCULAR; INTRAVENOUS at 09:23

## 2020-08-17 RX ADMIN — PROPOFOL 20 MG: 10 INJECTION, EMULSION INTRAVENOUS at 09:40

## 2020-08-17 RX ADMIN — FENTANYL CITRATE 50 MCG: 50 INJECTION, SOLUTION INTRAMUSCULAR; INTRAVENOUS at 09:27

## 2020-08-17 RX ADMIN — FENTANYL CITRATE 50 MCG: 50 INJECTION, SOLUTION INTRAMUSCULAR; INTRAVENOUS at 09:23

## 2020-08-17 NOTE — DISCHARGE INSTRUCTIONS
- Discharge patient to home (ambulatory).   - High fiber diet.   - Continue present medications.    - Repeat colonoscopy in 10 years for screening purposes.   - Return to GI office in 4 weeks.

## 2020-08-17 NOTE — ANESTHESIA PREPROCEDURE EVALUATION
Anesthesia Evaluation     Patient summary reviewed and Nursing notes reviewed   no history of anesthetic complications:  NPO Solid Status: > 8 hours  NPO Liquid Status: > 8 hours           Airway   Dental      Pulmonary    (+) a smoker Former, COPD, shortness of breath, sleep apnea,   Cardiovascular     (+) hypertension, NARAYAN, PVD, hyperlipidemia,   CAD:  inc risk.      Neuro/Psych  GI/Hepatic/Renal/Endo    (+) obesity, morbid obesity, GERD,    Diabetes:  inc risk.    Musculoskeletal     (+) arthralgias, back pain, chronic pain, myalgias,   Abdominal    Substance History      OB/GYN          Other                        Anesthesia Plan    ASA 3     MAC   (Risks and benefits discussed including risk of aspiration, recall and dental damage. All patient questions answered.    Will continue with plan of care.)  intravenous induction     Anesthetic plan, all risks, benefits, and alternatives have been provided, discussed and informed consent has been obtained with: patient.    Plan discussed with CRNA.

## 2020-08-17 NOTE — H&P
[facility]  HISTORY AND PHYSICAL    Patient Name: Bonilla Sterling Jr.  : 1960  MRN: [id number]    Chief Complaint:   For screening colonoscopy    History Of Presenting Illness:      Last colonoscopy , normal as per pt    Past Medical History:   Diagnosis Date   • Acid reflux    • Anxiety    • Chronic pain disorder     Back    • Elevated cholesterol    • Gout    • Hypertension    • Low back pain    • Wears glasses    • Wears partial dentures     Full upper plate - advised no adhesives DOS       Past Surgical History:   Procedure Laterality Date   • COLONOSCOPY     • MOUTH SURGERY      Oral extractions   • WRIST FRACTURE SURGERY Right        Social History     Socioeconomic History   • Marital status:      Spouse name: Not on file   • Number of children: Not on file   • Years of education: Not on file   • Highest education level: Not on file   Tobacco Use   • Smoking status: Former Smoker     Packs/day: 1.00     Years: 20.00     Pack years: 20.00     Types: Cigarettes     Last attempt to quit:      Years since quittin.6   • Smokeless tobacco: Never Used   Substance and Sexual Activity   • Alcohol use: No     Comment: quit 10 years ago    • Drug use: No   • Sexual activity: Defer       Family History   Problem Relation Age of Onset   • Diabetes Mother    • Hypertension Mother    • Breast cancer Mother    • Diabetes Father    • Hypertension Father    • No Known Problems Sister    • No Known Problems Brother    • No Known Problems Sister    • No Known Problems Sister    • No Known Problems Sister    • No Known Problems Sister    • No Known Problems Brother    • No Known Problems Brother    • Colon cancer Neg Hx        Prior to Admission Medications:  Medications Prior to Admission   Medication Sig Dispense Refill Last Dose   • acetaminophen (TYLENOL) 500 MG tablet Take 500 mg by mouth Every 6 (Six) Hours As Needed for Mild Pain .   Past Week at Unknown time   • allopurinol  (ZYLOPRIM) 100 MG tablet Take 100 mg by mouth Daily.   8/16/2020 at 0900   • amitriptyline (ELAVIL) 10 MG tablet Take 10 mg by mouth Every Night.   8/16/2020 at 0900   • atorvastatin (LIPITOR) 20 MG tablet Take 20 mg by mouth Daily.   8/16/2020 at 0900   • bisacodyl (DULCOLAX) 5 MG EC tablet Take as directed for colon prep (Patient taking differently: Take  by mouth Take As Directed. Take as directed for colon prep) 4 tablet 0 8/16/2020 at Unknown time   • cetirizine (zyrTEC) 10 MG tablet Take 10 mg by mouth Daily.   8/16/2020 at 0900   • hydrochlorothiazide (HYDRODIURIL) 25 MG tablet Take 25 mg by mouth Daily.   8/16/2020 at 0900   • lisinopril (PRINIVIL,ZESTRIL) 20 MG tablet Take 20 mg by mouth 2 (two) times a day.   8/17/2020 at 0730   • methocarbamol (ROBAXIN) 750 MG tablet TAKE ONE TABLET BY MOUTH IN THE EVENING FOR 30 DAYS (Patient taking differently: Take 750 mg by mouth Every Night.) 30 tablet 0 8/10/2020   • Multiple Vitamins-Minerals (CENTRUM SILVER PO) Take 1 tablet by mouth Daily.   8/10/2020   • nabumetone (RELAFEN) 750 MG tablet TAKE ONE TABLET BY MOUTH TWICE DAILY (Patient taking differently: Take 750 mg by mouth 2 (Two) Times a Day.) 60 tablet 2 8/10/2020   • omeprazole (priLOSEC) 20 MG capsule Take 20 mg by mouth Daily.   8/16/2020 at 0900   • polyethylene glycol (GoLYTELY) 236 g solution Starting at 5 pm on day prior to procedure, drink 8 ounces every 30 minutes as directed (Patient taking differently: Take  by mouth 1 (One) Time. Starting at 5 pm on day prior to procedure, drink 8 ounces every 30 minutes as directed) 4000 mL 0 8/16/2020 at Unknown time   • venlafaxine XR (EFFEXOR-XR) 37.5 MG 24 hr capsule Take 37.5 mg by mouth Daily.   8/16/2020 at 0900   • vitamin C (ASCORBIC ACID) 500 MG tablet Take 500 mg by mouth Daily.   8/10/2020       Allergies:  No Known Allergies     Vitals: Temp:  [97.3 °F (36.3 °C)] 97.3 °F (36.3 °C)  Heart Rate:  [92] 92  Resp:  [16] 16  BP: (123)/(84) 123/84    Review  Of Systems:  Constitutional:  Negative for chills, fever, and unexpected weight change.  Respiratory:  Negative for cough, chest tightness, shortness of breath, and wheezing.  Cardiovascular:  Negative for chest pain, palpitations, and leg swelling.  Gastrointestinal:  Negative for abdominal distention, abdominal pain, Nausea, vomiting.  Neurological:  Negative for Weakness, numbness, and headaches.     Physical Exam:    General Appearance:  Alert, cooperative, in no acute distress.   Lungs:   Clear to auscultation, respirations regular, even and                 unlabored.   Heart:  Regular rhythm and normal rate.   Abdomen:   Normal bowel sounds, no masses, no organomegaly. Soft, non-tender, non-distended   Neurologic: Alert and oriented x 3. Moves all four limbs equally       Plan: COLONOSCOPY (N/A)     Chantale Naik MD  8/17/2020

## 2020-08-17 NOTE — ANESTHESIA POSTPROCEDURE EVALUATION
Patient: Bonilla Sterling Jr.    Procedure Summary     Date:  08/17/20 Room / Location:  [facility] ENDOSCOPY 1 / [facility] ENDOSCOPY    Anesthesia Start:  0921 Anesthesia Stop:      Procedure:  COLONOSCOPY (N/A Anus) Diagnosis:       Colon cancer screening      (Colon cancer screening [Z12.11])    Surgeon:  Chantale Naik MD Provider:  Lloyd Thompson CRNA    Anesthesia Type:  MAC ASA Status:  3          Anesthesia Type: MAC    Vitals  No vitals data found for the desired time range.          Post Anesthesia Care and Evaluation    Patient location during evaluation: PHASE II  Patient participation: complete - patient participated  Level of consciousness: awake  Pain score: 0  Pain management: adequate  Airway patency: patent  Anesthetic complications: No anesthetic complications  PONV Status: none  Cardiovascular status: acceptable  Respiratory status: acceptable and nasal cannula  Hydration status: acceptable    Comments: vsss resp spont, reflexes intact, responsive, report given to pacu nurse

## 2020-09-14 ENCOUNTER — OFFICE VISIT (OUTPATIENT)
Dept: GASTROENTEROLOGY | Facility: CLINIC | Age: 60
End: 2020-09-14

## 2020-09-14 VITALS
TEMPERATURE: 96.9 F | HEIGHT: 69 IN | DIASTOLIC BLOOD PRESSURE: 94 MMHG | SYSTOLIC BLOOD PRESSURE: 147 MMHG | HEART RATE: 99 BPM | RESPIRATION RATE: 18 BRPM | BODY MASS INDEX: 42.8 KG/M2 | WEIGHT: 289 LBS

## 2020-09-14 DIAGNOSIS — K64.8 INTERNAL HEMORRHOIDS: ICD-10-CM

## 2020-09-14 DIAGNOSIS — K21.9 GASTROESOPHAGEAL REFLUX DISEASE, ESOPHAGITIS PRESENCE NOT SPECIFIED: Primary | ICD-10-CM

## 2020-09-14 DIAGNOSIS — K57.30 DIVERTICULOSIS OF COLON: ICD-10-CM

## 2020-09-14 PROBLEM — Z12.11 COLON CANCER SCREENING: Status: RESOLVED | Noted: 2020-07-29 | Resolved: 2020-09-14

## 2020-09-14 PROCEDURE — 99213 OFFICE O/P EST LOW 20 MIN: CPT | Performed by: NURSE PRACTITIONER

## 2020-09-14 NOTE — PATIENT INSTRUCTIONS
1. Antireflux measures: Avoid fried, fatty foods, alcohol, chocolate, coffee, tea,  soft drinks, peppermint and spearmint, spicy foods, tomatoes and tomato based foods, onion based foods, and smoking. Other antireflux measures include weight reduction if overweight, avoiding tight clothing around the abdomen, elevating the head of the bed 6 inches with blocks under the head board, and don't drink or eat before going to bed and avoid lying down immediately after meals.  2. Omeprazole 20 mg 1 by mouth in the am 30 minutes before breakfast.  3. High fiber, low fat diet with liberal water intake.   4. Screening colonoscopy in 10 years.  5. Upper endoscopy-EGD: Description of the procedure, risks, benefits, alternatives and options, including nonoperative options, were discussed with the patient in detail. The patient understands and wishes to proceed. He will call back to schedule.  6. COVID-19 testing prior to procedure. The patient will need to self-quarantine after testing until the procedure..

## 2023-01-30 ENCOUNTER — TRANSCRIBE ORDERS (OUTPATIENT)
Dept: ADMINISTRATIVE | Facility: HOSPITAL | Age: 63
End: 2023-01-30
Payer: COMMERCIAL

## 2023-01-30 DIAGNOSIS — M25.562 LEFT KNEE PAIN, UNSPECIFIED CHRONICITY: Primary | ICD-10-CM

## 2023-02-04 ENCOUNTER — TRANSCRIBE ORDERS (OUTPATIENT)
Dept: ULTRASOUND IMAGING | Facility: HOSPITAL | Age: 63
End: 2023-02-04

## 2023-02-04 ENCOUNTER — HOSPITAL ENCOUNTER (OUTPATIENT)
Dept: ULTRASOUND IMAGING | Facility: HOSPITAL | Age: 63
Discharge: HOME | End: 2023-02-04
Admitting: EMERGENCY MEDICINE
Payer: COMMERCIAL

## 2023-02-04 ENCOUNTER — TRANSCRIBE ORDERS (OUTPATIENT)
Dept: ULTRASOUND IMAGING | Facility: HOSPITAL | Age: 63
End: 2023-02-04
Payer: COMMERCIAL

## 2023-02-04 DIAGNOSIS — R22.42 MASS OF LOWER LEG, LEFT: ICD-10-CM

## 2023-02-04 DIAGNOSIS — R22.42 MASS OF LOWER LEG, LEFT: Primary | ICD-10-CM

## 2023-02-04 PROCEDURE — 93971 EXTREMITY STUDY: CPT

## 2023-08-22 NOTE — PROGRESS NOTES
Patient: Bonilla Sterling Jr.    YOB: 1960    Date: 08/28/2023    Primary Care Provider: Christin Yeh APRN    Reason for Consultation: Lesion    Chief Complaint   Patient presents with    Cyst       Subjective .     History of present illness:  I saw the patient in the office  today as a consultation for evaluation and treatment of a lower back abscess.  He was seen at urgent care and prescribed Doxycycline.  Culture result was Bacillus species.  Patient feels much better, no more drainage or redness.  Lesion had decreased in size significantly over the last 2 weeks and completion of antibiotics.    The following portions of the patient's history were reviewed and updated as appropriate: allergies, current medications, past family history, past medical history, past social history, past surgical history and problem list.    Review of Systems   Constitutional:  Negative for chills, fever and unexpected weight change.   HENT:  Negative for trouble swallowing and voice change.    Eyes:  Negative for visual disturbance.   Respiratory:  Negative for apnea, cough, chest tightness, shortness of breath and wheezing.    Cardiovascular:  Negative for chest pain, palpitations and leg swelling.   Gastrointestinal:  Negative for abdominal distention, abdominal pain, anal bleeding, blood in stool, constipation, diarrhea, nausea, rectal pain and vomiting.   Endocrine: Negative for cold intolerance and heat intolerance.   Genitourinary:  Negative for difficulty urinating, dysuria, flank pain, scrotal swelling and testicular pain.   Musculoskeletal:  Negative for back pain, gait problem and joint swelling.   Skin:  Negative for color change, rash and wound.   Neurological:  Negative for dizziness, syncope, speech difficulty, weakness, numbness and headaches.   Hematological:  Negative for adenopathy. Does not bruise/bleed easily.   Psychiatric/Behavioral:  Negative for confusion. The patient is not  nervous/anxious.      History:  Past Medical History:   Diagnosis Date    Acid reflux     Anxiety     Chronic pain disorder     Back     Elevated cholesterol     Gout     Hypertension     Low back pain     Wears glasses     Wears partial dentures     Full upper plate - advised no adhesives DOS       Past Surgical History:   Procedure Laterality Date    COLONOSCOPY      COLONOSCOPY N/A 2020    Procedure: COLONOSCOPY;  Surgeon: Chantale Naik MD;  Location: [facility] ENDOSCOPY;  Service: Gastroenterology;  Laterality: N/A;    MOUTH SURGERY      Oral extractions    WRIST FRACTURE SURGERY Right        Family History   Problem Relation Age of Onset    Diabetes Mother     Hypertension Mother     Breast cancer Mother     Diabetes Father     Hypertension Father     No Known Problems Sister     No Known Problems Brother     No Known Problems Sister     No Known Problems Sister     No Known Problems Sister     No Known Problems Sister     No Known Problems Brother     No Known Problems Brother     Colon cancer Neg Hx        Social History     Tobacco Use    Smoking status: Former     Packs/day: 1.00     Years: 20.00     Pack years: 20.00     Types: Cigarettes     Quit date:      Years since quittin.6     Passive exposure: Past    Smokeless tobacco: Never   Vaping Use    Vaping Use: Never used   Substance Use Topics    Alcohol use: No     Comment: quit 10 years ago     Drug use: No        Allergies:  No Known Allergies    Medications:    Current Outpatient Medications:     acetaminophen (TYLENOL) 500 MG tablet, Take 1 tablet by mouth Every 6 (Six) Hours As Needed for Mild Pain., Disp: , Rfl:     allopurinol (ZYLOPRIM) 100 MG tablet, Take 1 tablet by mouth Daily., Disp: , Rfl:     amitriptyline (ELAVIL) 10 MG tablet, Take 1 tablet by mouth Every Night., Disp: , Rfl:     atorvastatin (LIPITOR) 20 MG tablet, Take 1 tablet by mouth Daily., Disp: , Rfl:     cetirizine (zyrTEC) 10 MG tablet, Take 1 tablet by  "mouth Daily., Disp: , Rfl:     hydrochlorothiazide (HYDRODIURIL) 25 MG tablet, Take 1 tablet by mouth Daily., Disp: , Rfl:     lisinopril (PRINIVIL,ZESTRIL) 20 MG tablet, Take 1 tablet by mouth 2 (two) times a day., Disp: , Rfl:     methocarbamol (ROBAXIN) 750 MG tablet, TAKE ONE TABLET BY MOUTH IN THE EVENING FOR 30 DAYS (Patient taking differently: Take 1 tablet by mouth Every Night.), Disp: 30 tablet, Rfl: 0    Multiple Vitamins-Minerals (CENTRUM SILVER PO), Take 1 tablet by mouth Daily., Disp: , Rfl:     nabumetone (RELAFEN) 750 MG tablet, TAKE ONE TABLET BY MOUTH TWICE DAILY (Patient taking differently: Take 1 tablet by mouth 2 (Two) Times a Day.), Disp: 60 tablet, Rfl: 2    omeprazole (priLOSEC) 20 MG capsule, Take 1 capsule by mouth Daily., Disp: , Rfl:     venlafaxine XR (EFFEXOR-XR) 37.5 MG 24 hr capsule, Take 1 capsule by mouth Daily., Disp: , Rfl:     vitamin C (ASCORBIC ACID) 500 MG tablet, Take 1 tablet by mouth Daily., Disp: , Rfl:     clindamycin (CLEOCIN) 300 MG capsule, TAKE 1 CAPSULE BY MOUTH 4 TIMES A DAY FOR 10 DAYS, Disp: , Rfl:     doxycycline (VIBRAMYCIN) 100 MG capsule, Take 1 capsule by mouth 2 (Two) Times a Day., Disp: 14 capsule, Rfl: 0    Objective     Vital Signs:   Vitals:    08/28/23 1331   BP: 118/82   BP Location: Left arm   Pulse: 76   Resp: 18   Temp: 97.5 øF (36.4 øC)   TempSrc: Temporal   SpO2: 98%   Weight: 131 kg (289 lb)   Height: 175.3 cm (69\")       Physical Exam:  Lungs:     Clear to auscultation,respirations regular, even and                  unlabored    Heart:    Regular rhythm and normal rate, normal S1 and S2, no            murmur      General Appearance:    Alert, cooperative, in no acute distress   Head:    Normocephalic, without obvious abnormality, atraumatic   Eyes:            Lids and lashes normal, conjunctivae and sclerae normal, no   icterus, no pallor, corneas clear.   Ears:    Ears appear intact with no abnormalities noted   Throat:   No oral lesions, no " thrush, oral mucosa moist   Neck:   No adenopathy, supple, trachea midline, no thyromegaly, no   carotid bruit.   Extremities:   Moves all extremities well, no edema, no cyanosis, no             Redness.    Pulses:   Pulses palpable and equal bilaterally   Skin:   No bleeding, bruising or rash. Lesion left hip, 3 to 4 cm in size, some discoloration to the skin.  No redness or fluctuance.  No tenderness.   Lymph nodes:   No palpable adenopathy   Neurologic:   Cranial nerves 2 - 12 grossly intact, sensation intact.     Results Review:   I reviewed the patient's new clinical results.    Review of Systems was reviewed and confirmed as accurate as documented by the MA.    Assessment & Plan     1. Skin - sebaceous cyst        I did have a detailed and extensive discussion with the patient in the hospital and they understand that they need to undergo excision of residual cyst in the left hip.  I have told patient to wait 6 weeks, if mass resolved completely then no need for excision.  If the mass persists at that time we will excise the cyst in the office.. Full risks and benefits of operative versus nonoperative intervention were discussed with the patient and these include bleeding, infection and reccurrence. The patient understands, agrees, and wishes to proceed with the surgical treatment plan as mentioned above. The patient had no questions for me at the end of the discussion.       I discussed the patients findings and my recommendations with patient and consulting provider.    Electronically signed by Eboni Castro MD  08/28/23  14:05 EDT

## 2023-08-28 ENCOUNTER — OFFICE VISIT (OUTPATIENT)
Dept: SURGERY | Facility: CLINIC | Age: 63
End: 2023-08-28
Payer: MEDICARE

## 2023-08-28 VITALS
WEIGHT: 289 LBS | OXYGEN SATURATION: 98 % | HEART RATE: 76 BPM | SYSTOLIC BLOOD PRESSURE: 118 MMHG | DIASTOLIC BLOOD PRESSURE: 82 MMHG | TEMPERATURE: 97.5 F | HEIGHT: 69 IN | RESPIRATION RATE: 18 BRPM | BODY MASS INDEX: 42.8 KG/M2

## 2023-08-28 DIAGNOSIS — L72.3 SEBACEOUS CYST: Primary | ICD-10-CM

## 2023-08-28 PROCEDURE — 3079F DIAST BP 80-89 MM HG: CPT | Performed by: SURGERY

## 2023-08-28 PROCEDURE — 3074F SYST BP LT 130 MM HG: CPT | Performed by: SURGERY

## 2023-08-28 PROCEDURE — 1159F MED LIST DOCD IN RCRD: CPT | Performed by: SURGERY

## 2023-08-28 PROCEDURE — 1160F RVW MEDS BY RX/DR IN RCRD: CPT | Performed by: SURGERY

## 2023-08-28 PROCEDURE — 99203 OFFICE O/P NEW LOW 30 MIN: CPT | Performed by: SURGERY

## 2024-04-22 ENCOUNTER — HOSPITAL ENCOUNTER (EMERGENCY)
Facility: HOSPITAL | Age: 64
Discharge: HOME | End: 2024-04-22
Attending: STUDENT IN AN ORGANIZED HEALTH CARE EDUCATION/TRAINING PROGRAM | Admitting: STUDENT IN AN ORGANIZED HEALTH CARE EDUCATION/TRAINING PROGRAM
Payer: MEDICARE

## 2024-04-22 VITALS
TEMPERATURE: 98.2 F | SYSTOLIC BLOOD PRESSURE: 138 MMHG | HEIGHT: 69 IN | WEIGHT: 280 LBS | BODY MASS INDEX: 41.47 KG/M2 | DIASTOLIC BLOOD PRESSURE: 84 MMHG | HEART RATE: 87 BPM | OXYGEN SATURATION: 99 % | RESPIRATION RATE: 18 BRPM

## 2024-04-22 DIAGNOSIS — S00.01XA ABRASION OF SCALP, INITIAL ENCOUNTER: Primary | ICD-10-CM

## 2024-04-22 PROCEDURE — 25010000002 TETANUS-DIPHTH-ACELL PERTUSSIS 5-2.5-18.5 LF-MCG/0.5 SUSPENSION PREFILLED SYRINGE: Performed by: STUDENT IN AN ORGANIZED HEALTH CARE EDUCATION/TRAINING PROGRAM

## 2024-04-22 PROCEDURE — 90715 TDAP VACCINE 7 YRS/> IM: CPT | Performed by: STUDENT IN AN ORGANIZED HEALTH CARE EDUCATION/TRAINING PROGRAM

## 2024-04-22 PROCEDURE — 99282 EMERGENCY DEPT VISIT SF MDM: CPT

## 2024-04-22 PROCEDURE — 90471 IMMUNIZATION ADMIN: CPT | Performed by: STUDENT IN AN ORGANIZED HEALTH CARE EDUCATION/TRAINING PROGRAM

## 2024-04-22 RX ADMIN — TETANUS TOXOID, REDUCED DIPHTHERIA TOXOID AND ACELLULAR PERTUSSIS VACCINE, ADSORBED 0.5 ML: 5; 2.5; 8; 8; 2.5 SUSPENSION INTRAMUSCULAR at 14:52

## 2024-04-22 NOTE — ED PROVIDER NOTES
EMERGENCY DEPARTMENT ENCOUNTER    Pt Name: Bonilla Sterling Jr.  MRN: [id number]  Pt :   1960  Room Number:  22SF/22  Date of encounter:  2024  PCP: Christin Yeh APRN  ED Provider: Sandro Blanco DO      HPI:  Chief Complaint: Scalp injury    Context: Bonilla Sterling Jr. is a 64 y.o. male with past medical history listed below who presents to the ED with scalp injury.  Onset just on arrival.  Patient states that he stood up underneath a trailer and scraped his head on a nail. Sustained an abrasion to the scalp. Wound did bleed initially, but has since stopped after applying pressure. Denies loss conscious.  Denies any other traumatic injuries or complaints.  Tetanus is not up-to-date.    PAST MEDICAL HISTORY  Past Medical History:   Diagnosis Date    Acid reflux     Anxiety     Chronic pain disorder     Back     Elevated cholesterol     Gout     Hypertension     Low back pain     Wears glasses     Wears partial dentures     Full upper plate - advised no adhesives DOS     Current Outpatient Medications   Medication Instructions    acetaminophen (TYLENOL) 500 mg, Oral, Every 6 Hours PRN    allopurinol (ZYLOPRIM) 100 mg, Oral, Daily    amitriptyline (ELAVIL) 10 mg, Oral, Nightly    atorvastatin (LIPITOR) 20 mg, Oral, Daily    cetirizine (ZYRTEC) 10 mg, Oral, Daily    clindamycin (CLEOCIN) 300 MG capsule TAKE 1 CAPSULE BY MOUTH 4 TIMES A DAY FOR 10 DAYS    doxycycline (VIBRAMYCIN) 100 mg, Oral, 2 Times Daily    hydroCHLOROthiazide 25 mg, Oral, Daily    lisinopril (PRINIVIL,ZESTRIL) 20 mg, Oral, 2 times daily    methocarbamol (ROBAXIN) 750 MG tablet TAKE ONE TABLET BY MOUTH IN THE EVENING FOR 30 DAYS    Multiple Vitamins-Minerals (CENTRUM SILVER PO) 1 tablet, Oral, Daily    nabumetone (RELAFEN) 750 MG tablet TAKE ONE TABLET BY MOUTH TWICE DAILY    omeprazole (PRILOSEC) 20 mg, Oral, Daily    venlafaxine XR (EFFEXOR-XR) 37.5 mg, Oral, Daily    vitamin C (ASCORBIC ACID) 500 mg, Oral, Daily        PAST  SURGICAL HISTORY  Past Surgical History:   Procedure Laterality Date    COLONOSCOPY  2009    COLONOSCOPY N/A 8/17/2020    Procedure: COLONOSCOPY;  Surgeon: Chantale Naik MD;  Location: [facility] ENDOSCOPY;  Service: Gastroenterology;  Laterality: N/A;    MOUTH SURGERY      Oral extractions    WRIST FRACTURE SURGERY Right        FAMILY HISTORY  Family History   Problem Relation Age of Onset    Diabetes Mother     Hypertension Mother     Breast cancer Mother     Diabetes Father     Hypertension Father     No Known Problems Sister     No Known Problems Brother     No Known Problems Sister     No Known Problems Sister     No Known Problems Sister     No Known Problems Sister     No Known Problems Brother     No Known Problems Brother     Colon cancer Neg Hx        SOCIAL HISTORY  Social History     Socioeconomic History    Marital status:    Tobacco Use    Smoking status: Former     Current packs/day: 0.00     Average packs/day: 1 pack/day for 20.0 years (20.0 ttl pk-yrs)     Types: Cigarettes     Start date: 1989     Quit date: 2009     Years since quitting: 15.3     Passive exposure: Past    Smokeless tobacco: Never   Vaping Use    Vaping status: Never Used   Substance and Sexual Activity    Alcohol use: No     Comment: quit 10 years ago     Drug use: No    Sexual activity: Defer       ALLERGIES  Patient has no known allergies.    REVIEW OF SYSTEMS  All systems reviewed and negative except for those discussed in HPI.     PHYSICAL EXAM  ED Triage Vitals [04/22/24 1358]   Temp Heart Rate Resp BP SpO2   98.1 °F (36.7 °C) 110 18 140/83 98 %      Temp src Heart Rate Source Patient Position BP Location FiO2 (%)   Oral Monitor Sitting Left arm --     I have reviewed the triage vital signs and nursing notes.    General: Alert.  Nontoxic appearance.  No acute distress.  Head: Normocephalic.  Small abrasion to the parietal scalp.  No active bleeding.  Eyes: No scleral icterus.  Respiratory: Nonlabored  breathing.  GI: Abdomen is nondistended.  Neurologic: GCS 15.  Oriented x 3. No focal deficits.  Skin: No lacerations.  No erythema.  No edema.    LAB RESULTS  No results found for this or any previous visit (from the past 24 hour(s)).    RADIOLOGY  No Radiology Exams Resulted Within Past 24 Hours    PROCEDURES  Procedures    MEDICATIONS GIVEN IN ER  Medications   Tetanus-Diphth-Acell Pertussis (BOOSTRIX) injection 0.5 mL (0.5 mL Intramuscular Given 4/22/24 2932)       MEDICAL DECISION MAKING  64 y.o. male with past medical history listed above who presents with abrasion to the scalp. Vital signs within normal limits. Clinical presentation and physical exam consistent with abrasion to the scalp.  No laceration.  No clinical evidence of intracranial trauma or additional traumatic injuries.    No clinical indication for labs or imaging.    Tetanus was updated.    I discussed the findings of the ED workup with the patient at bedside.  No clinical indication for admission.  I recommended outpatient follow-up with PCP.  Patient was deemed medically stable for discharge with close outpatient follow-up and strict ED return precautions. Patient agreeable with plan and disposition.    Chronic conditions affecting care: None    Social determinants of health impacting treatment or disposition: None      REPEAT VITAL SIGNS  AS OF 15:01 EDT VITALS:  BP - 140/83  HR - 110  TEMP - 98.1 °F (36.7 °C) (Oral)  O2 SATS - 98%    DIAGNOSIS  Final diagnoses:   Abrasion of scalp, initial encounter       DISPOSITION  ED Disposition       ED Disposition   Discharge    Condition   Stable    Comment   --                     Please note that portions of this document were completed with voice recognition software.        Sandro Blanco DO  04/22/24 3282

## 2024-04-22 NOTE — Clinical Note
[facility] EMERGENCY DEPARTMENT  [address] [address]  Phone: [phone]    Bonilla Sterling was seen and treated in our emergency department on 4/22/2024.  He may return to work on 04/24/2024.         Thank you for choosing [facility].    Sandro Blanco DO

## 2024-07-29 ENCOUNTER — LAB (OUTPATIENT)
Dept: LAB | Facility: HOSPITAL | Age: 64
End: 2024-07-29
Payer: MEDICARE

## 2024-07-29 ENCOUNTER — OFFICE VISIT (OUTPATIENT)
Dept: NEUROLOGY | Facility: CLINIC | Age: 64
End: 2024-07-29
Payer: MEDICARE

## 2024-07-29 VITALS
SYSTOLIC BLOOD PRESSURE: 140 MMHG | BODY MASS INDEX: 43.1 KG/M2 | WEIGHT: 291 LBS | HEART RATE: 90 BPM | OXYGEN SATURATION: 100 % | DIASTOLIC BLOOD PRESSURE: 82 MMHG | HEIGHT: 69 IN | TEMPERATURE: 97.8 F

## 2024-07-29 DIAGNOSIS — R20.0 NUMBNESS AND TINGLING OF UPPER AND LOWER EXTREMITIES OF BOTH SIDES: ICD-10-CM

## 2024-07-29 DIAGNOSIS — R20.2 NUMBNESS AND TINGLING OF UPPER AND LOWER EXTREMITIES OF BOTH SIDES: ICD-10-CM

## 2024-07-29 DIAGNOSIS — R41.89 SUBJECTIVE MEMORY COMPLAINTS: Primary | ICD-10-CM

## 2024-07-29 DIAGNOSIS — R41.840 ATTENTION OR CONCENTRATION DEFICIT: ICD-10-CM

## 2024-07-29 DIAGNOSIS — R41.89 SUBJECTIVE MEMORY COMPLAINTS: ICD-10-CM

## 2024-07-29 LAB
FOLATE SERPL-MCNC: 18.2 NG/ML (ref 4.78–24.2)
TSH SERPL DL<=0.05 MIU/L-ACNC: 0.89 UIU/ML (ref 0.27–4.2)
VIT B12 BLD-MCNC: 1429 PG/ML (ref 211–946)

## 2024-07-29 PROCEDURE — 82607 VITAMIN B-12: CPT

## 2024-07-29 PROCEDURE — 36415 COLL VENOUS BLD VENIPUNCTURE: CPT

## 2024-07-29 PROCEDURE — 1159F MED LIST DOCD IN RCRD: CPT | Performed by: NURSE PRACTITIONER

## 2024-07-29 PROCEDURE — 99204 OFFICE O/P NEW MOD 45 MIN: CPT | Performed by: NURSE PRACTITIONER

## 2024-07-29 PROCEDURE — 83921 ORGANIC ACID SINGLE QUANT: CPT

## 2024-07-29 PROCEDURE — 3077F SYST BP >= 140 MM HG: CPT | Performed by: NURSE PRACTITIONER

## 2024-07-29 PROCEDURE — 1160F RVW MEDS BY RX/DR IN RCRD: CPT | Performed by: NURSE PRACTITIONER

## 2024-07-29 PROCEDURE — 82746 ASSAY OF FOLIC ACID SERUM: CPT

## 2024-07-29 PROCEDURE — 84443 ASSAY THYROID STIM HORMONE: CPT

## 2024-07-29 PROCEDURE — 3079F DIAST BP 80-89 MM HG: CPT | Performed by: NURSE PRACTITIONER

## 2024-07-29 RX ORDER — DULOXETIN HYDROCHLORIDE 30 MG/1
30 CAPSULE, DELAYED RELEASE ORAL DAILY
COMMUNITY
Start: 2024-06-28 | End: 2025-06-23

## 2024-07-29 RX ORDER — FUROSEMIDE 20 MG/1
TABLET ORAL
COMMUNITY

## 2024-07-29 NOTE — PROGRESS NOTES
New Patient Office Visit      Patient Name: Bonilla Sterling Jr.  : 1960   MRN: [id number]     Referring Physician: Christin Yeh A*    Chief Complaint:    Chief Complaint   Patient presents with   • Memory Loss     X 1-2 years       History of Present Illness: Bonilla Sterling Jr. is a 64 y.o. male who is here today to establish care with Neurology.  He is accompanied to the office visit today by his wife.  He says he has been having trouble with memory for the past 1-2 years.  He has trouble remembering appointments.  He also states that his wife tells him things and he does not recall the conversation but he does feel that sometimes there are certain words that will trigger the memory back to him.  He feels a lot of his trouble is with lack of attention.  He has difficulty concentrating.  Denies any safety concerns.  He has tingling and numbness in his upper and lower extremities. His last A1c was 5.8    Pertinent Medical History:    Mother and father had dementia.    Subjective      Review of Systems:   Review of Systems   Neurological:  Positive for numbness and memory problem.       Past Medical History:   Past Medical History:   Diagnosis Date   • Acid reflux    • Anxiety    • Chronic pain disorder     Back    • CTS (carpal tunnel syndrome)    • Elevated cholesterol    • Gout    • HL (hearing loss)    • Hypertension    • Low back pain    • Memory loss     Have an appointment to confirm   • Wears glasses    • Wears partial dentures     Full upper plate - advised no adhesives DOS       Past Surgical History:   Past Surgical History:   Procedure Laterality Date   • COLONOSCOPY     • COLONOSCOPY N/A 2020    Procedure: COLONOSCOPY;  Surgeon: Chantale Naik MD;  Location: [facility] ENDOSCOPY;  Service: Gastroenterology;  Laterality: N/A;   • MOUTH SURGERY      Oral extractions   • WRIST FRACTURE SURGERY Right        Family History:   Family History   Problem Relation Age of  Onset   • Diabetes Mother    • Hypertension Mother    • Breast cancer Mother    • Dementia Mother    • Alzheimer's disease Mother    • Diabetes Father    • Hypertension Father    • Dementia Father    • Alzheimer's disease Father    • No Known Problems Sister    • No Known Problems Sister    • No Known Problems Sister    • No Known Problems Sister    • No Known Problems Sister    • No Known Problems Brother    • No Known Problems Brother    • No Known Problems Brother    • Colon cancer Neg Hx        Social History:   Social History     Socioeconomic History   • Marital status:    Tobacco Use   • Smoking status: Former     Current packs/day: 0.00     Average packs/day: 1 pack/day for 20.0 years (20.0 ttl pk-yrs)     Types: Cigarettes     Start date: 1/1/1989     Quit date: 2009     Years since quitting: 15.5     Passive exposure: Past   • Smokeless tobacco: Never   Vaping Use   • Vaping status: Never Used   Substance and Sexual Activity   • Alcohol use: No     Comment: quit 10 years ago    • Drug use: No   • Sexual activity: Yes     Partners: Female     Birth control/protection: Condom       Medications:     Current Outpatient Medications:   •  acetaminophen (TYLENOL) 500 MG tablet, Take 1 tablet by mouth Every 6 (Six) Hours As Needed for Mild Pain., Disp: , Rfl:   •  allopurinol (ZYLOPRIM) 100 MG tablet, Take 1 tablet by mouth Daily., Disp: , Rfl:   •  amitriptyline (ELAVIL) 10 MG tablet, Take 1 tablet by mouth Every Night., Disp: , Rfl:   •  atorvastatin (LIPITOR) 20 MG tablet, Take 1 tablet by mouth Daily., Disp: , Rfl:   •  cetirizine (zyrTEC) 10 MG tablet, Take 1 tablet by mouth Daily., Disp: , Rfl:   •  DULoxetine (CYMBALTA) 30 MG capsule, Take 1 capsule by mouth Daily., Disp: , Rfl:   •  hydrochlorothiazide (HYDRODIURIL) 25 MG tablet, Take 1 tablet by mouth Daily., Disp: , Rfl:   •  lisinopril (PRINIVIL,ZESTRIL) 20 MG tablet, Take 1 tablet by mouth 2 (two) times a day., Disp: , Rfl:   •  methocarbamol  "(ROBAXIN) 750 MG tablet, TAKE ONE TABLET BY MOUTH IN THE EVENING FOR 30 DAYS (Patient taking differently: Take 1 tablet by mouth Every Night.), Disp: 30 tablet, Rfl: 0  •  Multiple Vitamins-Minerals (CENTRUM SILVER PO), Take 1 tablet by mouth Daily., Disp: , Rfl:   •  nabumetone (RELAFEN) 750 MG tablet, TAKE ONE TABLET BY MOUTH TWICE DAILY (Patient taking differently: Take 1 tablet by mouth 2 (Two) Times a Day.), Disp: 60 tablet, Rfl: 2  •  omeprazole (priLOSEC) 20 MG capsule, Take 1 capsule by mouth Daily., Disp: , Rfl:   •  venlafaxine XR (EFFEXOR-XR) 37.5 MG 24 hr capsule, Take 1 capsule by mouth Daily., Disp: , Rfl:   •  vitamin C (ASCORBIC ACID) 500 MG tablet, Take 1 tablet by mouth Daily., Disp: , Rfl:   •  furosemide (LASIX) 20 MG tablet, TAKE 1 TABLET BY MOUTH EVERY OTHER DAY IN THE MORNING, Disp: , Rfl:   •  mupirocin (BACTROBAN) 2 % ointment, Apply 1 Application topically to the appropriate area as directed., Disp: , Rfl:     Allergies:   No Known Allergies    Objective     Physical Exam:  Vital Signs:   Vitals:    07/29/24 1427   BP: 140/82   Pulse: 90   Temp: 97.8 °F (36.6 °C)   SpO2: 100%   Weight: 132 kg (291 lb)   Height: 175.3 cm (69\")   PainSc: 0-No pain     Body mass index is 42.97 kg/m².     Physical Exam  Constitutional:       Appearance: Normal appearance.   HENT:      Head: Normocephalic.   Eyes:      Extraocular Movements: EOM normal.      Conjunctiva/sclera: Conjunctivae normal.   Pulmonary:      Effort: Pulmonary effort is normal.   Musculoskeletal:         General: Normal range of motion.   Skin:     General: Skin is warm and dry.   Neurological:      General: No focal deficit present.      Mental Status: He is alert and oriented to person, place, and time.      Cranial Nerves: Cranial nerves 2-12 are intact. No dysarthria.      Motor: Motor function is intact. No tremor or pronator drift.      Coordination: Coordination is intact. Romberg sign positive. Finger-Nose-Finger Test normal.      " Gait: Gait is intact. Tandem walk normal.   Psychiatric:         Attention and Perception: Attention normal.         Mood and Affect: Mood and affect normal.         Speech: Speech normal.         Behavior: Behavior normal. Behavior is cooperative.         Thought Content: Thought content normal.         Cognition and Memory: Cognition normal.         Judgment: Judgment normal.         Neurologic Exam     Mental Status   Oriented to person, place, and time.   Oriented to city and area.   Oriented to year, month, date, day and season.   Registration: recalls 3 of 3 objects. Recall at 5 minutes: recalls 2 of 3 objects. Follows 3 step commands.   Attention: normal. Concentration: normal.   Speech: speech is normal   Level of consciousness: alert  Knowledge: good.   Able to name object. Able to read. Able to repeat. Able to write. Normal comprehension.     Cranial Nerves   Cranial nerves II through XII intact.     CN III, IV, VI   Extraocular motions are normal.   Right pupil: Size: 5 mm. Shape: regular. Reactivity: brisk.   Left pupil: Size: 5 mm. Shape: regular. Reactivity: brisk.   Nystagmus: none     CN VII   Facial expression full, symmetric.     CN XII   Tongue: not atrophic  Fasciculations: absent  Tongue deviation: none    Motor Exam     Strength   Right deltoid: 5/5  Left deltoid: 5/5  Right biceps: 5/5  Left biceps: 5/5  Right triceps: 5/5  Left triceps: 5/5  Right quadriceps: 5/5  Left quadriceps: 5/5  Right hamstrin/5  Left hamstrin/5  Right anterior tibial: 5/5  Left anterior tibial: 5/5  Right posterior tibial: 5/5  Left posterior tibial: 5/5    Sensory Exam   Light touch normal.     Gait, Coordination, and Reflexes     Gait  Gait: normal    Coordination   Finger to nose coordination: normal  Tandem walking coordination: normal    Tremor   Resting tremor: absent  Intention tremor: absent  Action tremor: absent      Assessment / Plan      Assessment/Plan:   Diagnoses and all orders for this  visit:    1. Subjective memory complaints (Primary)  -     MRI Brain With & Without Contrast; Future  -     TSH; Future  -     Vitamin B12; Future  -     Folate; Future  -     Methylmalonic Acid, Serum; Future  -     Ambulatory Referral to Psychology    2. Numbness and tingling of upper and lower extremities of both sides  -     TSH; Future    3. Attention or concentration deficit  -     Ambulatory Referral to Psychology       MRI of the brain with and without contrast has been ordered to rule out any demyelinating disease as well as any consideration of structural or or vascular abnormality which may be contributing to his symptoms.  Additional labs have been ordered to rule out any deficiencies or abnormalities.  Indications and side effects discussed with patient she verbalizes understanding.  Patient will call in the interim if she has any questions or concerns or changes.  -neuropsych testing  Follow Up:   Return in about 2 months (around 9/29/2024).    DOUG Gonzalez, RICHARD-[facility]

## 2024-07-30 ENCOUNTER — TELEPHONE (OUTPATIENT)
Dept: NEUROLOGY | Facility: CLINIC | Age: 64
End: 2024-07-30
Payer: MEDICARE

## 2024-08-05 ENCOUNTER — PATIENT ROUNDING (BHMG ONLY) (OUTPATIENT)
Dept: NEUROLOGY | Facility: CLINIC | Age: 64
End: 2024-08-05
Payer: MEDICARE

## 2024-08-05 LAB — METHYLMALONATE SERPL-SCNC: 135 NMOL/L (ref 0–378)

## 2024-09-11 ENCOUNTER — HOSPITAL ENCOUNTER (OUTPATIENT)
Dept: MRI IMAGING | Facility: HOSPITAL | Age: 64
Discharge: HOME | End: 2024-09-11
Admitting: NURSE PRACTITIONER
Payer: MEDICARE

## 2024-09-11 DIAGNOSIS — R41.89 SUBJECTIVE MEMORY COMPLAINTS: ICD-10-CM

## 2024-09-11 PROCEDURE — 0 GADOBENATE DIMEGLUMINE 529 MG/ML SOLUTION: Performed by: NURSE PRACTITIONER

## 2024-09-11 PROCEDURE — A9577 INJ MULTIHANCE: HCPCS | Performed by: NURSE PRACTITIONER

## 2024-09-11 PROCEDURE — 70553 MRI BRAIN STEM W/O & W/DYE: CPT

## 2024-09-11 RX ADMIN — GADOBENATE DIMEGLUMINE 15 ML: 529 INJECTION, SOLUTION INTRAVENOUS at 15:02

## 2024-09-11 RX ADMIN — GADOBENATE DIMEGLUMINE 10 ML: 529 INJECTION, SOLUTION INTRAVENOUS at 15:01

## 2024-09-30 ENCOUNTER — OFFICE VISIT (OUTPATIENT)
Dept: NEUROLOGY | Facility: CLINIC | Age: 64
End: 2024-09-30
Payer: MEDICARE

## 2024-09-30 VITALS
DIASTOLIC BLOOD PRESSURE: 74 MMHG | HEIGHT: 69 IN | TEMPERATURE: 98.9 F | SYSTOLIC BLOOD PRESSURE: 118 MMHG | BODY MASS INDEX: 43.1 KG/M2 | WEIGHT: 291 LBS | OXYGEN SATURATION: 100 % | HEART RATE: 94 BPM

## 2024-09-30 DIAGNOSIS — F41.9 ANXIETY: ICD-10-CM

## 2024-09-30 DIAGNOSIS — R41.89 SUBJECTIVE MEMORY COMPLAINTS: Primary | ICD-10-CM

## 2024-09-30 DIAGNOSIS — R41.840 INATTENTION: ICD-10-CM

## 2024-09-30 PROCEDURE — 3078F DIAST BP <80 MM HG: CPT | Performed by: NURSE PRACTITIONER

## 2024-09-30 PROCEDURE — 1160F RVW MEDS BY RX/DR IN RCRD: CPT | Performed by: NURSE PRACTITIONER

## 2024-09-30 PROCEDURE — 99213 OFFICE O/P EST LOW 20 MIN: CPT | Performed by: NURSE PRACTITIONER

## 2024-09-30 PROCEDURE — 3074F SYST BP LT 130 MM HG: CPT | Performed by: NURSE PRACTITIONER

## 2024-09-30 PROCEDURE — 1159F MED LIST DOCD IN RCRD: CPT | Performed by: NURSE PRACTITIONER

## 2024-09-30 RX ORDER — DONEPEZIL HYDROCHLORIDE 5 MG/1
5 TABLET, FILM COATED ORAL NIGHTLY
Qty: 30 TABLET | Refills: 2 | Status: SHIPPED | OUTPATIENT
Start: 2024-09-30

## 2024-09-30 NOTE — PROGRESS NOTES
Follow Up Office Visit      Patient Name: Bonilla Sterling Jr.  : 1960   MRN: [id number]     Chief Complaint:    Chief Complaint   Patient presents with    Memory Loss     Follow up       History of Present Illness: Bonilla Sterling Jr. is a 64 y.o. male who is here today to follow up with memory complaints.  He is accompanied to the office visit today by his wife.  They are here to follow-up on his MRI as well as his neuropsych testing.  He had neuropsych testing on 8/15/2024 his profile was consistent with mild cognitive impairment characterized by highly variable verbal learning and memory.  Overall attention functions are extremely low range.  Processing speed is reduced.  Auditory attention and verbal fluency are borderline impaired.  Suspect anxiety, depression, fluctuations in attention and sleep dysregulation are exacerbating variables.  Recommendations were to consider initiation of memory enhancing medication for memory protection and cognitive stability.  And continuing psychopharmacologic therapy to address mood related symptoms.  Patient states he now has a CPAP machine.  He has difficulty staying focused per his wife's report.  He also gets upset if she tells him he has repeated things.    MRI of the brain with and without contrast on 2024 as follows:  FINDINGS: Diffusion sequences show no signal abnormalities to indicate  acute infarct or other process.     Brain parenchyma displays no evidence of mass, hemorrhage or edema.  There is moderate atrophy, mildly pronounced for the patient's age. Mild  chronic small vessel ischemic change is age-appropriate. No extra-axial  abnormal findings are seen. The ventricles and cisterns appear normal.  Paranasal sinuses and mastoid air cells are clear. No abnormal enhancing  lesions are identified on the post infusion images.     IMPRESSION:  Atrophy and chronic changes without acute intracranial  process.     No abnormal enhancement  identified.  History of Present Illness: Bonilla Sterling Jr. is a 64 y.o. male who is here today to establish care with Neurology.  He is accompanied to the office visit today by his wife.  He says he has been having trouble with memory for the past 1-2 years.  He has trouble remembering appointments.  He also states that his wife tells him things and he does not recall the conversation but he does feel that sometimes there are certain words that will trigger the memory back to him.  He feels a lot of his trouble is with lack of attention.  He has difficulty concentrating.  Denies any safety concerns.  He has tingling and numbness in his upper and lower extremities. His last A1c was 5.8    Pertinent Medical History:    Mother and father had dementia.    Subjective      Review of Systems:   Review of Systems   Neurological:  Positive for memory problem.       I have reviewed and the following portions of the patient's history were updated as appropriate: past family history, past medical history, past social history, past surgical history and problem list.    Medications:     Current Outpatient Medications:     acetaminophen (TYLENOL) 500 MG tablet, Take 1 tablet by mouth Every 6 (Six) Hours As Needed for Mild Pain., Disp: , Rfl:     allopurinol (ZYLOPRIM) 100 MG tablet, Take 1 tablet by mouth Daily., Disp: , Rfl:     amitriptyline (ELAVIL) 10 MG tablet, Take 1 tablet by mouth Every Night., Disp: , Rfl:     atorvastatin (LIPITOR) 20 MG tablet, Take 1 tablet by mouth Daily., Disp: , Rfl:     cetirizine (zyrTEC) 10 MG tablet, Take 1 tablet by mouth Daily., Disp: , Rfl:     DULoxetine (CYMBALTA) 30 MG capsule, Take 1 capsule by mouth Daily., Disp: , Rfl:     furosemide (LASIX) 20 MG tablet, TAKE 1 TABLET BY MOUTH EVERY OTHER DAY IN THE MORNING, Disp: , Rfl:     hydrochlorothiazide (HYDRODIURIL) 25 MG tablet, Take 1 tablet by mouth Daily., Disp: , Rfl:     lisinopril (PRINIVIL,ZESTRIL) 20 MG tablet, Take 1 tablet by  "mouth 2 (two) times a day., Disp: , Rfl:     methocarbamol (ROBAXIN) 750 MG tablet, TAKE ONE TABLET BY MOUTH IN THE EVENING FOR 30 DAYS (Patient taking differently: Take 1 tablet by mouth Every Night.), Disp: 30 tablet, Rfl: 0    Multiple Vitamins-Minerals (CENTRUM SILVER PO), Take 1 tablet by mouth Daily., Disp: , Rfl:     mupirocin (BACTROBAN) 2 % ointment, Apply 1 Application topically to the appropriate area as directed., Disp: , Rfl:     nabumetone (RELAFEN) 750 MG tablet, TAKE ONE TABLET BY MOUTH TWICE DAILY (Patient taking differently: Take 1 tablet by mouth 2 (Two) Times a Day.), Disp: 60 tablet, Rfl: 2    omeprazole (priLOSEC) 20 MG capsule, Take 1 capsule by mouth Daily., Disp: , Rfl:     venlafaxine XR (EFFEXOR-XR) 37.5 MG 24 hr capsule, Take 1 capsule by mouth Daily., Disp: , Rfl:     vitamin C (ASCORBIC ACID) 500 MG tablet, Take 1 tablet by mouth Daily., Disp: , Rfl:     donepezil (Aricept) 5 MG tablet, Take 1 tablet by mouth Every Night., Disp: 30 tablet, Rfl: 2    Allergies:   No Known Allergies    Objective     Physical Exam:  Vital Signs:   Vitals:    09/30/24 1454   BP: 118/74   Pulse: 94   Temp: 98.9 °F (37.2 °C)   SpO2: 100%   Weight: 132 kg (291 lb)  Comment: previous weight   Height: 175.3 cm (69\")   PainSc: 0-No pain     Body mass index is 42.97 kg/m².    Physical Exam  Constitutional:       Appearance: Normal appearance.   HENT:      Head: Normocephalic.   Eyes:      Conjunctiva/sclera: Conjunctivae normal.   Pulmonary:      Effort: Pulmonary effort is normal.   Musculoskeletal:         General: Normal range of motion.   Skin:     General: Skin is warm and dry.   Neurological:      General: No focal deficit present.      Mental Status: He is alert and oriented to person, place, and time.      Cranial Nerves: Cranial nerves 2-12 are intact. No dysarthria.      Motor: Motor function is intact.      Coordination: Coordination is intact.      Gait: Gait is intact.   Psychiatric:         Attention " and Perception: Attention normal.         Mood and Affect: Mood and affect normal.         Speech: Speech normal.         Behavior: Behavior normal. Behavior is cooperative.         Thought Content: Thought content normal.         Cognition and Memory: Cognition normal.         Judgment: Judgment normal.         Neurologic Exam     Mental Status   Oriented to person, place, and time.   Speech: speech is normal   Level of consciousness: alert  Knowledge: good.     Cranial Nerves   Cranial nerves II through XII intact.     Gait, Coordination, and Reflexes     Gait  Gait: normal       Assessment / Plan      Assessment/Plan:   Diagnoses and all orders for this visit:    1. Subjective memory complaints (Primary)  -     donepezil (Aricept) 5 MG tablet; Take 1 tablet by mouth Every Night.  Dispense: 30 tablet; Refill: 2    2. Inattention  -     Ambulatory Referral to Behavioral Health    3. Anxiety  -     Ambulatory Referral to Behavioral Health         -Patient is in agreement to have referral to behavioral health to address his difficulty with inattention.  He also has some components of depression and anxiety as he has had multiple deaths in his family.  Will start donepezil 5 mg daily.  Discussed with patient at next office visit may consider starting memantine as well.  Indications and side effects discussed with patient she verbalizes understanding.  Patient will call in the interim if she has any questions or concerns or changes.    Follow Up:   Return in about 2 months (around 11/30/2024).    DOUG Gonzalez, LAUREANOP-[facility]

## 2024-11-26 ENCOUNTER — OFFICE VISIT (OUTPATIENT)
Dept: NEUROLOGY | Facility: CLINIC | Age: 64
End: 2024-11-26
Payer: MEDICARE

## 2024-11-26 VITALS
TEMPERATURE: 97.1 F | SYSTOLIC BLOOD PRESSURE: 130 MMHG | BODY MASS INDEX: 42.95 KG/M2 | HEART RATE: 86 BPM | OXYGEN SATURATION: 99 % | HEIGHT: 69 IN | DIASTOLIC BLOOD PRESSURE: 82 MMHG | WEIGHT: 290 LBS

## 2024-11-26 DIAGNOSIS — R41.89 SUBJECTIVE MEMORY COMPLAINTS: ICD-10-CM

## 2024-11-26 DIAGNOSIS — G31.84 MILD COGNITIVE IMPAIRMENT: Primary | ICD-10-CM

## 2024-11-26 RX ORDER — DONEPEZIL HYDROCHLORIDE 10 MG/1
10 TABLET, FILM COATED ORAL NIGHTLY
Qty: 30 TABLET | Refills: 5 | Status: SHIPPED | OUTPATIENT
Start: 2024-11-26

## 2024-11-26 NOTE — PROGRESS NOTES
Follow Up Office Visit      Patient Name: Bonilla Sterling Jr.  : 1960   MRN: [id number]     Chief Complaint:    Chief Complaint   Patient presents with    Follow-up     Memory concerns; reports improvement     History of Present Illness: Bonilla Sterling Jr. is a 64 y.o. male who is here today to follow up with memory concerns.  He is accompanied to the office visit today by his wife.  He was started on donepezil 5 mg daily at last office visit after discussion with patient and reviewing his neuropsych testing which was consistent with mild cognitive impairment.  Patient and his wife both state that they have noted some improvement since the initiation of donepezil.  He is repeating himself less often.  He is showing improvement in memory with recent conversations.  He is not misplacing things.  He continues to drive independently and reports no accidents, he is able to navigate easily and safely per his wife's report.  He is independent with his ADLs.  He is able to manage his medications independently and correctly.  He has been wearing his CPAP and reports he is compliant with this.        History of Present Illness: Bonilla Sterling Jr. is a 64 y.o. male who is here today to follow up with memory complaints.  He is accompanied to the office visit today by his wife.  They are here to follow-up on his MRI as well as his neuropsych testing.  He had neuropsych testing on 8/15/2024 his profile was consistent with mild cognitive impairment characterized by highly variable verbal learning and memory.  Overall attention functions are extremely low range.  Processing speed is reduced.  Auditory attention and verbal fluency are borderline impaired.  Suspect anxiety, depression, fluctuations in attention and sleep dysregulation are exacerbating variables.  Recommendations were to consider initiation of memory enhancing medication for memory protection and cognitive stability.  And continuing psychopharmacologic  therapy to address mood related symptoms.  Patient states he now has a CPAP machine.  He has difficulty staying focused per his wife's report.  He also gets upset if she tells him he has repeated things.    MRI of the brain with and without contrast on 9/11/2024 as follows:  FINDINGS: Diffusion sequences show no signal abnormalities to indicate  acute infarct or other process.     Brain parenchyma displays no evidence of mass, hemorrhage or edema.  There is moderate atrophy, mildly pronounced for the patient's age. Mild  chronic small vessel ischemic change is age-appropriate. No extra-axial  abnormal findings are seen. The ventricles and cisterns appear normal.  Paranasal sinuses and mastoid air cells are clear. No abnormal enhancing  lesions are identified on the post infusion images.     IMPRESSION:  Atrophy and chronic changes without acute intracranial  process.     No abnormal enhancement identified.  History of Present Illness: Bonilla Sterling Jr. is a 64 y.o. male who is here today to establish care with Neurology.  He is accompanied to the office visit today by his wife.  He says he has been having trouble with memory for the past 1-2 years.  He has trouble remembering appointments.  He also states that his wife tells him things and he does not recall the conversation but he does feel that sometimes there are certain words that will trigger the memory back to him.  He feels a lot of his trouble is with lack of attention.  He has difficulty concentrating.  Denies any safety concerns.  He has tingling and numbness in his upper and lower extremities. His last A1c was 5.8    Pertinent Medical History: Hypertension, hypercholesterolemia, chronic back pain, anxiety, gout, MIO  Mother and father had dementia.        Subjective      Review of Systems:   Review of Systems   Neurological:  Positive for numbness and memory problem.       I have reviewed and the following portions of the patient's history were updated  as appropriate: past family history, past medical history, past social history, past surgical history and problem list.    Medications:     Current Outpatient Medications:     acetaminophen (TYLENOL) 500 MG tablet, Take 1 tablet by mouth Every 6 (Six) Hours As Needed for Mild Pain., Disp: , Rfl:     allopurinol (ZYLOPRIM) 100 MG tablet, Take 1 tablet by mouth Daily., Disp: , Rfl:     amitriptyline (ELAVIL) 10 MG tablet, Take 1 tablet by mouth Every Night., Disp: , Rfl:     atorvastatin (LIPITOR) 20 MG tablet, Take 1 tablet by mouth Daily., Disp: , Rfl:     cetirizine (zyrTEC) 10 MG tablet, Take 1 tablet by mouth Daily., Disp: , Rfl:     donepezil (Aricept) 10 MG tablet, Take 1 tablet by mouth Every Night., Disp: 30 tablet, Rfl: 5    DULoxetine (CYMBALTA) 30 MG capsule, Take 1 capsule by mouth Daily., Disp: , Rfl:     furosemide (LASIX) 20 MG tablet, TAKE 1 TABLET BY MOUTH EVERY OTHER DAY IN THE MORNING, Disp: , Rfl:     hydrochlorothiazide (HYDRODIURIL) 25 MG tablet, Take 1 tablet by mouth Daily., Disp: , Rfl:     lisinopril (PRINIVIL,ZESTRIL) 20 MG tablet, Take 1 tablet by mouth 2 (two) times a day., Disp: , Rfl:     methocarbamol (ROBAXIN) 750 MG tablet, TAKE ONE TABLET BY MOUTH IN THE EVENING FOR 30 DAYS (Patient taking differently: Take 1 tablet by mouth Every Night.), Disp: 30 tablet, Rfl: 0    Multiple Vitamins-Minerals (CENTRUM SILVER PO), Take 1 tablet by mouth Daily., Disp: , Rfl:     mupirocin (BACTROBAN) 2 % ointment, Apply 1 Application topically to the appropriate area as directed., Disp: , Rfl:     nabumetone (RELAFEN) 750 MG tablet, TAKE ONE TABLET BY MOUTH TWICE DAILY (Patient taking differently: Take 1 tablet by mouth 2 (Two) Times a Day.), Disp: 60 tablet, Rfl: 2    omeprazole (priLOSEC) 20 MG capsule, Take 1 capsule by mouth Daily., Disp: , Rfl:     venlafaxine XR (EFFEXOR-XR) 37.5 MG 24 hr capsule, Take 1 capsule by mouth Daily., Disp: , Rfl:     vitamin C (ASCORBIC ACID) 500 MG tablet, Take 1  "tablet by mouth Daily., Disp: , Rfl:     Allergies:   No Known Allergies    Objective     Physical Exam:  Vital Signs:   Vitals:    11/26/24 0805   BP: 130/82   Pulse: 86   Temp: 97.1 °F (36.2 °C)   SpO2: 99%   Weight: 132 kg (290 lb)   Height: 175.3 cm (69\")     Body mass index is 42.83 kg/m².    Physical Exam  Constitutional:       Appearance: Normal appearance.   HENT:      Head: Normocephalic.   Eyes:      Conjunctiva/sclera: Conjunctivae normal.   Pulmonary:      Effort: Pulmonary effort is normal.   Musculoskeletal:         General: Normal range of motion.   Skin:     General: Skin is warm and dry.   Neurological:      General: No focal deficit present.      Mental Status: He is oriented to person, place, and time.      Cranial Nerves: Cranial nerves 2-12 are intact. No dysarthria.      Motor: Motor function is intact. No tremor.   Psychiatric:         Attention and Perception: Attention normal.         Mood and Affect: Mood and affect normal.         Speech: Speech normal.         Behavior: Behavior normal. Behavior is cooperative.         Thought Content: Thought content normal.         Cognition and Memory: Cognition normal.         Judgment: Judgment normal.         Neurological Exam  Mental Status  Awake, alert and oriented to person, place and time. Oriented to person, place, and time. Recent and remote memory are intact. Speech is normal. no dysarthria present. Language is fluent with no aphasia. Attention and concentration are normal. Fund of knowledge is appropriate for level of education. Apraxia absent.    Coordination  No tremor    Gait   Unable to rise from chair without using arms.  Antalgic gait noted.      Assessment / Plan      Assessment/Plan:   Diagnoses and all orders for this visit:    1. Mild cognitive impairment (Primary)    2. Subjective memory complaints  -     donepezil (Aricept) 10 MG tablet; Take 1 tablet by mouth Every Night.  Dispense: 30 tablet; Refill: 5       Patient is " tolerating his donepezil 5 mg without side effect.  He has seen some improvement in his memory and his wife is in agreement with this.  They would like to increase this to 10 mg after discussion.  We also discussed addition of memantine in the future if needed.  Patient will call in the interim if she has any questions or concerns or changes.    Follow Up:   Return in about 6 months (around 5/26/2025).    DOUG Gonzalez, FNP-[facility] Neurology and Sleep Medicine

## 2024-12-12 ENCOUNTER — OFFICE VISIT (OUTPATIENT)
Dept: PSYCHIATRY | Facility: CLINIC | Age: 64
End: 2024-12-12
Payer: MEDICARE

## 2024-12-12 VITALS
HEIGHT: 69 IN | HEART RATE: 103 BPM | SYSTOLIC BLOOD PRESSURE: 144 MMHG | OXYGEN SATURATION: 97 % | WEIGHT: 290 LBS | DIASTOLIC BLOOD PRESSURE: 78 MMHG | BODY MASS INDEX: 42.95 KG/M2

## 2024-12-12 DIAGNOSIS — F41.1 GENERALIZED ANXIETY DISORDER: ICD-10-CM

## 2024-12-12 DIAGNOSIS — F33.2 SEVERE EPISODE OF RECURRENT MAJOR DEPRESSIVE DISORDER, WITHOUT PSYCHOTIC FEATURES: Primary | ICD-10-CM

## 2024-12-12 PROCEDURE — 90792 PSYCH DIAG EVAL W/MED SRVCS: CPT | Performed by: NURSE PRACTITIONER

## 2024-12-12 PROCEDURE — 3077F SYST BP >= 140 MM HG: CPT | Performed by: NURSE PRACTITIONER

## 2024-12-12 PROCEDURE — 3078F DIAST BP <80 MM HG: CPT | Performed by: NURSE PRACTITIONER

## 2024-12-12 PROCEDURE — 1159F MED LIST DOCD IN RCRD: CPT | Performed by: NURSE PRACTITIONER

## 2024-12-12 PROCEDURE — 1160F RVW MEDS BY RX/DR IN RCRD: CPT | Performed by: NURSE PRACTITIONER

## 2024-12-12 RX ORDER — DULOXETIN HYDROCHLORIDE 60 MG/1
60 CAPSULE, DELAYED RELEASE ORAL EVERY MORNING
Qty: 30 CAPSULE | Refills: 5 | Status: SHIPPED | OUTPATIENT
Start: 2024-12-12
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
## 2025-01-16 ENCOUNTER — TELEMEDICINE (OUTPATIENT)
Dept: PSYCHIATRY | Facility: CLINIC | Age: 65
End: 2025-01-16
Payer: MEDICARE

## 2025-01-16 DIAGNOSIS — G47.09 OTHER INSOMNIA: ICD-10-CM

## 2025-01-16 DIAGNOSIS — F33.1 MODERATE EPISODE OF RECURRENT MAJOR DEPRESSIVE DISORDER: Primary | Chronic | ICD-10-CM

## 2025-01-16 DIAGNOSIS — F41.1 GENERALIZED ANXIETY DISORDER: Chronic | ICD-10-CM

## 2025-01-16 RX ORDER — AMITRIPTYLINE HYDROCHLORIDE 10 MG/1
20 TABLET ORAL NIGHTLY
Qty: 60 TABLET | Refills: 2 | Status: SHIPPED | OUTPATIENT
Start: 2025-01-16

## 2025-01-16 NOTE — PROGRESS NOTES
"This provider is located at [facility], [address] in [place], [place],using a secure [employer] Video Visit through [email or web address]. Patient is being seen remotely via telehealth at their home address in [place], and stated they are in a secure environment for this session. The patient's condition being diagnosed/treated is appropriate for telemedicine. The provider identified herself as well as her credentials.   The patient, and/or patients guardian, consent to be seen remotely, and when consent is given they understand that the consent allows for patient identifiable information to be sent to a third party as needed.   They may refuse to be seen remotely at any time. The electronic data is encrypted and password protected, and the patient and/or guardian has been advised of the potential risks to privacy not withstanding such measures.      Mode of Visit: Video  Location of patient: -HOME-  Location of provider: +HOME+  You have chosen to receive care through a telehealth visit.  The patient has signed the video visit consent form.  The visit included audio and video interaction. No technical issues occurred during this visit.      Chief Complaint  Depression and Anxiety      Subjective          Bonilla Sterling  presents today via [employer] Video through [employer] for medication management of his depression and anxiety.     History of Present Illness: Patient presents today for follow-up appointment after last being seen on 12/12/2024 for an initial evaluation.  At that appointment his Cymbalta was increased.  Patient presents today and says \"I am not doing too bad, not any worse than I was\".  Patient says the holiday season was very difficult and he is glad it is now over.  He says it was hard just knowing that his parents and son would never be there again.  Patient says not much is different than it was approximately 6 weeks ago.  He is still struggling " significantly with sleep, especially getting to sleep.  Patient says his sleep schedule continues to be a major problem in his day-to-day life.  Patient says he just lays there for a long time if he goes to bed too early so now he just does not go to bed until very late.  Patient says he has also continued to struggle with just not expending enough energy throughout the day which he feels is part of the problem with his sleep.  He is not able to stand for more than 10 or 15 minutes at a time because of pain in his back.  He says he is also considering asking for a referral back to a neurosurgeon and having something done about his back.  Patient says the medication change made at his last appointment has been beneficial for him.  His PHQ-9 and RIZWAN-7 scores are both greatly improved today.  He says he has been taking the increased dose of Cymbalta on a daily basis and has not had any adverse effects associated with that change.  His appetite has been the same and he denies any new concerns there.  He denies any SI/HI, A/V hallucinations.      The following portions of the patient's history were reviewed and updated as appropriate: allergies, current medications, past family history, past medical history, past social history, past surgical history and problem list.      Current Medications:   Current Outpatient Medications   Medication Sig Dispense Refill    acetaminophen (TYLENOL) 500 MG tablet Take 1 tablet by mouth Every 6 (Six) Hours As Needed for Mild Pain.      allopurinol (ZYLOPRIM) 100 MG tablet Take 1 tablet by mouth Daily.      amitriptyline (ELAVIL) 10 MG tablet Take 2 tablets by mouth Every Night. 60 tablet 2    atorvastatin (LIPITOR) 20 MG tablet Take 1 tablet by mouth Daily.      donepezil (Aricept) 10 MG tablet Take 1 tablet by mouth Every Night. 30 tablet 5    DULoxetine (CYMBALTA) 60 MG capsule Take 1 capsule by mouth Every Morning. 30 capsule 5    hydrochlorothiazide (HYDRODIURIL) 25 MG tablet Take 1  tablet by mouth Daily.      lisinopril (PRINIVIL,ZESTRIL) 20 MG tablet Take 1 tablet by mouth 2 (two) times a day.      Multiple Vitamins-Minerals (CENTRUM SILVER PO) Take 1 tablet by mouth Daily.      omeprazole (priLOSEC) 20 MG capsule Take 1 capsule by mouth Daily.      vitamin C (ASCORBIC ACID) 500 MG tablet Take 1 tablet by mouth Daily.       No current facility-administered medications for this visit.       Mental Status Exam:   Hygiene:    MyChart video  Cooperation:  Cooperative  Eye Contact:  Good  Psychomotor Behavior:  Appropriate  Affect:  Appropriate  Mood: euthymic  Speech:  Normal  Thought Process:  Goal directed  Thought Content:  Mood congruent  Suicidal:  None  Homicidal:  None  Hallucinations:  None  Delusion:  None  Memory:  Intact  Orientation:  Person, Place, Time, and Situation  Reliability:  fair  Insight:  Fair  Judgement:  Fair  Impulse Control:  Fair  Physical/Medical Issues:    DDD, GERD, HLD, HTN, gout, MIO        Objective   Vital Signs:   There were no vitals taken for this visit.    Physical Exam  Neurological:      Mental Status: He is oriented to person, place, and time. Mental status is at baseline.   Psychiatric:         Behavior: Behavior is cooperative.        Result Review :     The following data was reviewed by: DOUG Bladn on 01/16/2025:    Data reviewed : Previous note, medication history           Assessment and Plan    Diagnoses and all orders for this visit:    1. Moderate episode of recurrent major depressive disorder (Primary)    2. Generalized anxiety disorder  -     amitriptyline (ELAVIL) 10 MG tablet; Take 2 tablets by mouth Every Night.  Dispense: 60 tablet; Refill: 2    3. Other insomnia  -     amitriptyline (ELAVIL) 10 MG tablet; Take 2 tablets by mouth Every Night.  Dispense: 60 tablet; Refill: 2         PHQ-9 Score:   PHQ-9 Total Score: (Patient-Rptd) 12       Depression Screening:  Patient screened positive for depression based on a PHQ-9 score of 12  on 1/15/2025. Follow-up recommendations include: Prescribed antidepressant medication treatment and Suicide Risk Assessment performed.        Tobacco Cessation:  Patient is a former tobacco user. No tobacco cessation education necessary.      Impression/Plan:  -This is a follow-up appointment.  Patient presents today and reports he has been doing a little better with regards to his depression and anxiety.  His PHQ-9 and RIZWAN-7 scores are greatly improved.  We are going to maintain his Cymbalta as he has been taking it.  We did discuss his sleep quite a bit as it continues to be very poor, and he has not been able to do anything about his sleep schedule.  He has been taking amitriptyline and did confirm he was previously taking it in the morning but is now taking it at night.  I would like to increase his amitriptyline to 20 mg and I encouraged him to focus heavily on getting his sleep schedule back to going to bed in the evening and waking up in the morning.  He is in agreement with this plan.  -Increase amitriptyline to 20 mg nightly for sleeping difficulties and anxiety.  -Maintain Cymbalta 60 mg daily for depression and anxiety.  -Patient's CARROLL report reviewed and deemed appropriate.  Patient counseled on use of controlled substances.  -Reviewed available lab work.  -Schedule MyChart video follow-up appointment for 6 weeks or as needed.    MEDS ORDERED DURING VISIT:  New Medications Ordered This Visit   Medications    amitriptyline (ELAVIL) 10 MG tablet     Sig: Take 2 tablets by mouth Every Night.     Dispense:  60 tablet     Refill:  2         Follow Up   Return in about 6 weeks (around 2/27/2025), or if symptoms worsen or fail to improve, for Next scheduled follow up, Video visit.  Patient was given instructions and counseling regarding his condition or for health maintenance advice. Please see specific information pulled into the AVS if appropriate.       TREATMENT PLAN/GOALS: Continue supportive  psychotherapy efforts and medications as indicated. Treatment and medication options discussed during today's visit. Patient acknowledged and verbally consented to continue with current treatment plan and was educated on the importance of compliance with treatment and follow-up appointments.    MEDICATION ISSUES:  Discussed medication options and treatment plan of prescribed medication as well as the risks, benefits, and side effects including potential falls, possible impaired driving and metabolic adversities among others. Patient is agreeable to call the office with any worsening of symptoms or onset of side effects. Patient is agreeable to call 911 or go to the nearest ER should he/she begin having SI/HI.          This document has been electronically signed by DOUG Varela, PMHNP-BC  January 16, 2025 13:35 EST      Part of this note may be an electronic transcription/translation of spoken language to printed text using the Dragon Dictation System.

## 2025-02-04 ENCOUNTER — TRANSCRIBE ORDERS (OUTPATIENT)
Dept: GENERAL RADIOLOGY | Facility: HOSPITAL | Age: 65
End: 2025-02-04
Payer: MEDICARE

## 2025-02-04 ENCOUNTER — HOSPITAL ENCOUNTER (OUTPATIENT)
Dept: GENERAL RADIOLOGY | Facility: HOSPITAL | Age: 65
Discharge: HOME | End: 2025-02-04
Admitting: NURSE PRACTITIONER
Payer: MEDICARE

## 2025-02-04 DIAGNOSIS — G89.29 CHRONIC BILATERAL LOW BACK PAIN WITHOUT SCIATICA: ICD-10-CM

## 2025-02-04 DIAGNOSIS — M25.512 CHRONIC LEFT SHOULDER PAIN: ICD-10-CM

## 2025-02-04 DIAGNOSIS — M25.521 RIGHT ELBOW PAIN: ICD-10-CM

## 2025-02-04 DIAGNOSIS — G89.29 CHRONIC LEFT SHOULDER PAIN: ICD-10-CM

## 2025-02-04 DIAGNOSIS — M54.50 CHRONIC BILATERAL LOW BACK PAIN WITHOUT SCIATICA: ICD-10-CM

## 2025-02-04 DIAGNOSIS — M25.521 RIGHT ELBOW PAIN: Primary | ICD-10-CM

## 2025-02-04 PROCEDURE — 73030 X-RAY EXAM OF SHOULDER: CPT

## 2025-02-04 PROCEDURE — 72100 X-RAY EXAM L-S SPINE 2/3 VWS: CPT

## 2025-02-04 PROCEDURE — 73080 X-RAY EXAM OF ELBOW: CPT

## 2025-02-12 ENCOUNTER — TRANSCRIBE ORDERS (OUTPATIENT)
Dept: ADMINISTRATIVE | Facility: HOSPITAL | Age: 65
End: 2025-02-12
Payer: MEDICARE

## 2025-02-12 DIAGNOSIS — M54.50 LOW BACK PAIN WITHOUT SCIATICA, UNSPECIFIED BACK PAIN LATERALITY, UNSPECIFIED CHRONICITY: Primary | ICD-10-CM

## 2025-03-06 ENCOUNTER — HOSPITAL ENCOUNTER (OUTPATIENT)
Dept: MRI IMAGING | Facility: HOSPITAL | Age: 65
Discharge: HOME | End: 2025-03-06
Admitting: NURSE PRACTITIONER
Payer: MEDICARE

## 2025-03-06 DIAGNOSIS — M54.50 LOW BACK PAIN WITHOUT SCIATICA, UNSPECIFIED BACK PAIN LATERALITY, UNSPECIFIED CHRONICITY: ICD-10-CM

## 2025-03-06 PROCEDURE — 72148 MRI LUMBAR SPINE W/O DYE: CPT

## 2025-03-28 ENCOUNTER — OFFICE VISIT (OUTPATIENT)
Dept: NEUROSURGERY | Facility: CLINIC | Age: 65
End: 2025-03-28
Payer: MEDICARE

## 2025-03-28 VITALS — BODY MASS INDEX: 43.99 KG/M2 | WEIGHT: 297 LBS | TEMPERATURE: 98 F | HEIGHT: 69 IN

## 2025-03-28 DIAGNOSIS — M43.16 SPONDYLOLISTHESIS OF LUMBAR REGION: ICD-10-CM

## 2025-03-28 DIAGNOSIS — M48.062 SPINAL STENOSIS, LUMBAR REGION, WITH NEUROGENIC CLAUDICATION: Primary | ICD-10-CM

## 2025-03-28 PROCEDURE — 1160F RVW MEDS BY RX/DR IN RCRD: CPT | Performed by: NEUROLOGICAL SURGERY

## 2025-03-28 PROCEDURE — 1159F MED LIST DOCD IN RCRD: CPT | Performed by: NEUROLOGICAL SURGERY

## 2025-03-28 PROCEDURE — 99203 OFFICE O/P NEW LOW 30 MIN: CPT | Performed by: NEUROLOGICAL SURGERY

## 2025-03-28 RX ORDER — TADALAFIL 5 MG/1
1 TABLET ORAL DAILY
COMMUNITY
Start: 2025-02-28 | End: 2025-03-30

## 2025-03-28 NOTE — PROGRESS NOTES
Patient: Bonilla Sterling Jr.  : 1960    Primary Care Provider: Christin Yeh APRN    Requesting Provider: As above          History of Present Illness  The patient is a 65-year-old gentleman who presents for evaluation of lower back pain.    Mr. Sterling has been experiencing persistent lower back pain since the late , which has significantly impacted his quality of life.  A decade ago he saw my former colleague Dr. Figueroa.  The pain intensifies after approximately 15 minutes of standing, often resulting in tremors. He finds temporary relief by arching his back, but the pain recurs upon straightening. Leaning forward does not alleviate the discomfort. He reports no current leg symptoms. He is currently retired. Despite undergoing chiropractic treatment the pain persists, limiting his ability to stand for extended periods. He also received platelet-rich plasma injections at the [facility], which initially provided relief, but subsequent treatments were ineffective. He is not currently receiving chiropractic care. He had previously experienced sciatic pain radiating down his right leg to his foot, but this symptom has been absent for the past 3 to 4 years.    SOCIAL HISTORY  He is currently retired.      Review of Systems   Constitutional:  Negative for activity change, appetite change, chills, diaphoresis, fatigue, fever and unexpected weight change.   HENT:  Negative for congestion, dental problem, drooling, ear discharge, ear pain, facial swelling, hearing loss, mouth sores, nosebleeds, postnasal drip, rhinorrhea, sinus pressure, sinus pain, sneezing, sore throat, tinnitus, trouble swallowing and voice change.    Eyes:  Negative for photophobia, pain, discharge, redness, itching and visual disturbance.   Respiratory:  Negative for apnea, cough, choking, chest tightness, shortness of breath, wheezing and stridor.    Cardiovascular:  Negative for chest pain, palpitations and leg swelling.    Gastrointestinal:  Negative for abdominal distention, abdominal pain, anal bleeding, blood in stool, constipation, diarrhea, nausea, rectal pain and vomiting.   Endocrine: Negative for cold intolerance, heat intolerance, polydipsia, polyphagia and polyuria.   Genitourinary:  Negative for decreased urine volume, difficulty urinating, dysuria, enuresis, flank pain, frequency, genital sores, hematuria, penile discharge, penile pain, penile swelling, scrotal swelling, testicular pain and urgency.   Musculoskeletal:  Positive for back pain. Negative for arthralgias, gait problem, joint swelling, myalgias, neck pain and neck stiffness.   Skin:  Negative for color change, pallor, rash and wound.   Allergic/Immunologic: Negative for environmental allergies, food allergies and immunocompromised state.   Neurological:  Negative for dizziness, tremors, seizures, syncope, facial asymmetry, speech difficulty, weakness, light-headedness, numbness and headaches.   Hematological:  Negative for adenopathy. Does not bruise/bleed easily.   Psychiatric/Behavioral:  Negative for agitation, behavioral problems, confusion, decreased concentration, dysphoric mood, hallucinations, self-injury, sleep disturbance and suicidal ideas. The patient is not nervous/anxious and is not hyperactive.      The patient's past medical history, past surgical history, family history, and social history have been reviewed at length in the electronic medical record.      Physical Exam:   CONSTITUTIONAL: Patient is well-nourished, pleasant and appears stated age.  MUSCULOSKELETAL:  Straight leg raising is negative.  Santy's Sign is negative.  ROM in the low back is normal.  Tenderness in the back to palpation is not observed.  NEUROLOGICAL:  Orientation, memory, attention span, language function, and cognition have been examined and are intact.  Strength is intact in the lower extremities to direct testing.  Muscle tone is normal throughout.  Station and  gait are normal.  Sensation is intact to light touch testing throughout.  Deep tendon reflexes are difficult to elicit throughout  Coordination is intact.      Medical Decision Making    Data Review:   (All imaging studies were personally reviewed unless stated otherwise)  Results  MRI of the lumbar spine dated 3/6/2025 demonstrates diffuse degenerative disc disease and facet arthropathy.  There is a low-grade listhesis of L4 on L5 and perhaps even at L3-4.  There is at least moderate stenosis at multiple levels due to facet and ligamentous overgrowth as well as some degree of epidural lipomatosis.  I think the stenosis is most pronounced at L4-5.    Plain films of the lumbar spine dated 2/4/25 demonstrate the above referenced L4-5 spondylolisthesis.  There is diffuse spondylosis.      Diagnosis:   1.  Lumbar stenosis with neurogenic claudication.  2.  L4-5 spondylolisthesis.    Treatment Options:   I am going to set up a lumbar CT myelogram with upright images.  I think the patient is at a point where he will likely require surgical intervention.  Further recommendations will ensue after reviewing the study.    Patient or patient representative verbalized consent for the use of Ambient Listening during the visit with  Yang Whitley MD for chart documentation. 3/28/2025  15:07 EDT    I, Dr. Whitley, personally performed the services described in the documentation, as scribed in my presence, and it is both accurate and complete.

## 2025-04-14 ENCOUNTER — TELEPHONE (OUTPATIENT)
Dept: INFUSION THERAPY | Facility: HOSPITAL | Age: 65
End: 2025-04-14
Payer: MEDICARE

## 2025-04-14 NOTE — TELEPHONE ENCOUNTER
Contacted patient as pre-procedure call prior to planned lumbar myelogram scheduled for 4/15/25. Reviewed with patient arrival to main registration by 6 am,  needed, nothing to eat after midnight but clear liquids okay, may take medications the morning of the procedure but use caution with hypoglycemic medications until after allowed to eat. Plan on being here for procedure 4-5 hours so wear comfortable clothes. Reviewed medical history, medications, allergies and allowed time for questions.

## 2025-04-15 ENCOUNTER — HOSPITAL ENCOUNTER (OUTPATIENT)
Dept: GENERAL RADIOLOGY | Facility: HOSPITAL | Age: 65
Discharge: HOME | End: 2025-04-15
Payer: MEDICARE

## 2025-04-15 ENCOUNTER — HOSPITAL ENCOUNTER (OUTPATIENT)
Dept: CT IMAGING | Facility: HOSPITAL | Age: 65
Discharge: HOME | End: 2025-04-15
Payer: MEDICARE

## 2025-04-15 VITALS
RESPIRATION RATE: 18 BRPM | OXYGEN SATURATION: 98 % | TEMPERATURE: 96.1 F | HEART RATE: 80 BPM | HEIGHT: 69 IN | BODY MASS INDEX: 43.69 KG/M2 | DIASTOLIC BLOOD PRESSURE: 87 MMHG | WEIGHT: 295 LBS | SYSTOLIC BLOOD PRESSURE: 152 MMHG

## 2025-04-15 DIAGNOSIS — M48.062 SPINAL STENOSIS, LUMBAR REGION, WITH NEUROGENIC CLAUDICATION: ICD-10-CM

## 2025-04-15 DIAGNOSIS — M48.062 SPINAL STENOSIS, LUMBAR REGION WITH NEUROGENIC CLAUDICATION: ICD-10-CM

## 2025-04-15 PROCEDURE — 62304 MYELOGRAPHY LUMBAR INJECTION: CPT

## 2025-04-15 PROCEDURE — 72120 X-RAY BEND ONLY L-S SPINE: CPT

## 2025-04-15 PROCEDURE — 25010000002 LIDOCAINE 1 % SOLUTION: Performed by: NEUROLOGICAL SURGERY

## 2025-04-15 PROCEDURE — 72132 CT LUMBAR SPINE W/DYE: CPT

## 2025-04-15 PROCEDURE — 72240 MYELOGRAPHY NECK SPINE: CPT

## 2025-04-15 PROCEDURE — 25510000001 IOPAMIDOL 41 % SOLUTION: Performed by: NEUROLOGICAL SURGERY

## 2025-04-15 RX ORDER — IOPAMIDOL 408 MG/ML
20 INJECTION, SOLUTION INTRATHECAL
Status: COMPLETED | OUTPATIENT
Start: 2025-04-15 | End: 2025-04-15

## 2025-04-15 RX ORDER — ONDANSETRON 4 MG/1
4 TABLET, ORALLY DISINTEGRATING ORAL ONCE AS NEEDED
Status: DISCONTINUED | OUTPATIENT
Start: 2025-04-15 | End: 2025-04-16 | Stop reason: HOSPADM

## 2025-04-15 RX ORDER — PROMETHAZINE HYDROCHLORIDE 25 MG/1
25 TABLET ORAL ONCE AS NEEDED
Status: DISCONTINUED | OUTPATIENT
Start: 2025-04-15 | End: 2025-04-16 | Stop reason: HOSPADM

## 2025-04-15 RX ORDER — LIDOCAINE HYDROCHLORIDE 10 MG/ML
5 INJECTION, SOLUTION INFILTRATION; PERINEURAL ONCE
Status: COMPLETED | OUTPATIENT
Start: 2025-04-15 | End: 2025-04-15

## 2025-04-15 RX ADMIN — IOPAMIDOL 20 ML: 408 INJECTION, SOLUTION INTRATHECAL at 07:46

## 2025-04-15 RX ADMIN — LIDOCAINE HYDROCHLORIDE 5 ML: 10 INJECTION, SOLUTION INFILTRATION; PERINEURAL at 07:46

## 2025-04-15 NOTE — POST-PROCEDURE NOTE
MYELOGRAM PROCEDURE NOTE  Neurosurgery    Patient: Bonilla Sterling Jr.  : 1960      PreOp Diagnosis: Lumbar stenosis with neurogenic claudication    PostOp Diagnosis: Same    Procedure: Lumbar myelogram    Surgeon: Gutierrez    Anesthesia: 1% lidocaine    Technique:   Spinal needle: 22 gauge   Contrast: Isovue 200 (20ml)   Injection site: R L3    EBL: Trace    Specimens removed: None    Complication: None        Yang Whitley MD  04/15/25  07:37 EDT

## 2025-04-16 ENCOUNTER — TELEPHONE (OUTPATIENT)
Dept: INFUSION THERAPY | Facility: HOSPITAL | Age: 65
End: 2025-04-16
Payer: MEDICARE

## 2025-04-28 ENCOUNTER — APPOINTMENT (OUTPATIENT)
Dept: CT IMAGING | Facility: HOSPITAL | Age: 65
End: 2025-04-28
Payer: MEDICARE

## 2025-04-28 ENCOUNTER — HOSPITAL ENCOUNTER (EMERGENCY)
Facility: HOSPITAL | Age: 65
Discharge: HOME | End: 2025-04-28
Attending: STUDENT IN AN ORGANIZED HEALTH CARE EDUCATION/TRAINING PROGRAM | Admitting: STUDENT IN AN ORGANIZED HEALTH CARE EDUCATION/TRAINING PROGRAM
Payer: MEDICARE

## 2025-04-28 VITALS
HEART RATE: 78 BPM | HEIGHT: 69 IN | RESPIRATION RATE: 18 BRPM | BODY MASS INDEX: 43.76 KG/M2 | OXYGEN SATURATION: 97 % | WEIGHT: 295.42 LBS | SYSTOLIC BLOOD PRESSURE: 135 MMHG | TEMPERATURE: 98.6 F | DIASTOLIC BLOOD PRESSURE: 88 MMHG

## 2025-04-28 DIAGNOSIS — G43.909 MIGRAINE WITHOUT STATUS MIGRAINOSUS, NOT INTRACTABLE, UNSPECIFIED MIGRAINE TYPE: Primary | ICD-10-CM

## 2025-04-28 LAB
ALBUMIN SERPL-MCNC: 4.4 G/DL (ref 3.5–5.2)
ALBUMIN/GLOB SERPL: 1.2 G/DL
ALP SERPL-CCNC: 90 U/L (ref 39–117)
ALT SERPL W P-5'-P-CCNC: 33 U/L (ref 1–41)
ANION GAP SERPL CALCULATED.3IONS-SCNC: 13.8 MMOL/L (ref 5–15)
AST SERPL-CCNC: 22 U/L (ref 1–40)
BASOPHILS # BLD AUTO: 0.01 10*3/MM3 (ref 0–0.2)
BASOPHILS NFR BLD AUTO: 0.1 % (ref 0–1.5)
BILIRUB SERPL-MCNC: 0.3 MG/DL (ref 0–1.2)
BUN SERPL-MCNC: 21 MG/DL (ref 8–23)
BUN/CREAT SERPL: 15.8 (ref 7–25)
CALCIUM SPEC-SCNC: 9.3 MG/DL (ref 8.6–10.5)
CHLORIDE SERPL-SCNC: 98 MMOL/L (ref 98–107)
CO2 SERPL-SCNC: 27.2 MMOL/L (ref 22–29)
CREAT SERPL-MCNC: 1.33 MG/DL (ref 0.76–1.27)
DEPRECATED RDW RBC AUTO: 47.7 FL (ref 37–54)
EGFRCR SERPLBLD CKD-EPI 2021: 58.6 ML/MIN/1.73
EOSINOPHIL # BLD AUTO: 0.01 10*3/MM3 (ref 0–0.4)
EOSINOPHIL NFR BLD AUTO: 0.1 % (ref 0.3–6.2)
ERYTHROCYTE [DISTWIDTH] IN BLOOD BY AUTOMATED COUNT: 15.4 % (ref 12.3–15.4)
GLOBULIN UR ELPH-MCNC: 3.8 GM/DL
GLUCOSE SERPL-MCNC: 119 MG/DL (ref 65–99)
HCT VFR BLD AUTO: 46.7 % (ref 37.5–51)
HGB BLD-MCNC: 14.6 G/DL (ref 13–17.7)
IMM GRANULOCYTES # BLD AUTO: 0.04 10*3/MM3 (ref 0–0.05)
IMM GRANULOCYTES NFR BLD AUTO: 0.4 % (ref 0–0.5)
LYMPHOCYTES # BLD AUTO: 2.77 10*3/MM3 (ref 0.7–3.1)
LYMPHOCYTES NFR BLD AUTO: 26.2 % (ref 19.6–45.3)
MCH RBC QN AUTO: 26.5 PG (ref 26.6–33)
MCHC RBC AUTO-ENTMCNC: 31.3 G/DL (ref 31.5–35.7)
MCV RBC AUTO: 84.8 FL (ref 79–97)
MONOCYTES # BLD AUTO: 0.63 10*3/MM3 (ref 0.1–0.9)
MONOCYTES NFR BLD AUTO: 5.9 % (ref 5–12)
NEUTROPHILS NFR BLD AUTO: 67.3 % (ref 42.7–76)
NEUTROPHILS NFR BLD AUTO: 7.13 10*3/MM3 (ref 1.7–7)
NRBC BLD AUTO-RTO: 0 /100 WBC (ref 0–0.2)
PLATELET # BLD AUTO: 306 10*3/MM3 (ref 140–450)
PMV BLD AUTO: 9.1 FL (ref 6–12)
POTASSIUM SERPL-SCNC: 4.1 MMOL/L (ref 3.5–5.2)
PROT SERPL-MCNC: 8.2 G/DL (ref 6–8.5)
RBC # BLD AUTO: 5.51 10*6/MM3 (ref 4.14–5.8)
SODIUM SERPL-SCNC: 139 MMOL/L (ref 136–145)
WBC NRBC COR # BLD AUTO: 10.59 10*3/MM3 (ref 3.4–10.8)

## 2025-04-28 PROCEDURE — 96375 TX/PRO/DX INJ NEW DRUG ADDON: CPT

## 2025-04-28 PROCEDURE — 96374 THER/PROPH/DIAG INJ IV PUSH: CPT

## 2025-04-28 PROCEDURE — 72125 CT NECK SPINE W/O DYE: CPT

## 2025-04-28 PROCEDURE — 85025 COMPLETE CBC W/AUTO DIFF WBC: CPT | Performed by: NURSE PRACTITIONER

## 2025-04-28 PROCEDURE — 25010000002 DIPHENHYDRAMINE PER 50 MG: Performed by: NURSE PRACTITIONER

## 2025-04-28 PROCEDURE — 80053 COMPREHEN METABOLIC PANEL: CPT | Performed by: NURSE PRACTITIONER

## 2025-04-28 PROCEDURE — 25010000002 PROCHLORPERAZINE 10 MG/2ML SOLUTION: Performed by: NURSE PRACTITIONER

## 2025-04-28 PROCEDURE — 96372 THER/PROPH/DIAG INJ SC/IM: CPT

## 2025-04-28 PROCEDURE — 25010000002 DEXAMETHASONE SODIUM PHOSPHATE 10 MG/ML SOLUTION: Performed by: NURSE PRACTITIONER

## 2025-04-28 PROCEDURE — 99284 EMERGENCY DEPT VISIT MOD MDM: CPT | Performed by: STUDENT IN AN ORGANIZED HEALTH CARE EDUCATION/TRAINING PROGRAM

## 2025-04-28 PROCEDURE — 25810000003 SODIUM CHLORIDE 0.9 % SOLUTION: Performed by: NURSE PRACTITIONER

## 2025-04-28 PROCEDURE — 70450 CT HEAD/BRAIN W/O DYE: CPT

## 2025-04-28 PROCEDURE — 25010000002 KETOROLAC TROMETHAMINE PER 15 MG: Performed by: NURSE PRACTITIONER

## 2025-04-28 RX ORDER — DEXAMETHASONE SODIUM PHOSPHATE 10 MG/ML
10 INJECTION, SOLUTION INTRAMUSCULAR; INTRAVENOUS ONCE
Status: COMPLETED | OUTPATIENT
Start: 2025-04-28 | End: 2025-04-28

## 2025-04-28 RX ORDER — DIPHENHYDRAMINE HYDROCHLORIDE 50 MG/ML
25 INJECTION, SOLUTION INTRAMUSCULAR; INTRAVENOUS ONCE
Status: COMPLETED | OUTPATIENT
Start: 2025-04-28 | End: 2025-04-28

## 2025-04-28 RX ORDER — SODIUM CHLORIDE 0.9 % (FLUSH) 0.9 %
10 SYRINGE (ML) INJECTION AS NEEDED
Status: DISCONTINUED | OUTPATIENT
Start: 2025-04-28 | End: 2025-04-28 | Stop reason: HOSPADM

## 2025-04-28 RX ORDER — KETOROLAC TROMETHAMINE 30 MG/ML
15 INJECTION, SOLUTION INTRAMUSCULAR; INTRAVENOUS ONCE
Status: COMPLETED | OUTPATIENT
Start: 2025-04-28 | End: 2025-04-28

## 2025-04-28 RX ORDER — SUMATRIPTAN 6 MG/.5ML
6 INJECTION, SOLUTION SUBCUTANEOUS ONCE
Status: COMPLETED | OUTPATIENT
Start: 2025-04-28 | End: 2025-04-28

## 2025-04-28 RX ORDER — PROCHLORPERAZINE EDISYLATE 5 MG/ML
10 INJECTION INTRAMUSCULAR; INTRAVENOUS ONCE
Status: COMPLETED | OUTPATIENT
Start: 2025-04-28 | End: 2025-04-28

## 2025-04-28 RX ADMIN — DEXAMETHASONE SODIUM PHOSPHATE 10 MG: 10 INJECTION INTRAMUSCULAR; INTRAVENOUS at 14:40

## 2025-04-28 RX ADMIN — SODIUM CHLORIDE 1000 ML: 9 INJECTION, SOLUTION INTRAVENOUS at 14:37

## 2025-04-28 RX ADMIN — KETOROLAC TROMETHAMINE 15 MG: 30 INJECTION, SOLUTION INTRAMUSCULAR; INTRAVENOUS at 14:39

## 2025-04-28 RX ADMIN — SUMATRIPTAN 6 MG: 6 INJECTION SUBCUTANEOUS at 14:40

## 2025-04-28 RX ADMIN — PROCHLORPERAZINE EDISYLATE 10 MG: 5 INJECTION INTRAMUSCULAR; INTRAVENOUS at 14:38

## 2025-04-28 RX ADMIN — DIPHENHYDRAMINE HYDROCHLORIDE 25 MG: 50 INJECTION, SOLUTION INTRAMUSCULAR; INTRAVENOUS at 14:39

## 2025-04-28 NOTE — DISCHARGE INSTRUCTIONS
- Please take all medication as prescribed please read drug instructions and ask the pharmacist if you have any questions regarding your medications  -Please follow up with your Primary Care Physician within three days or sooner if symptoms worsen  -Please return to the nearest emergency department as needed or desired for any new, worsening, or persistent symptoms.   Negative

## 2025-05-02 ENCOUNTER — PREP FOR SURGERY (OUTPATIENT)
Dept: OTHER | Facility: HOSPITAL | Age: 65
End: 2025-05-02
Payer: MEDICARE

## 2025-05-02 ENCOUNTER — OFFICE VISIT (OUTPATIENT)
Dept: NEUROSURGERY | Facility: CLINIC | Age: 65
End: 2025-05-02
Payer: MEDICARE

## 2025-05-02 VITALS — WEIGHT: 294 LBS | HEIGHT: 69 IN | TEMPERATURE: 97.2 F | BODY MASS INDEX: 43.55 KG/M2

## 2025-05-02 DIAGNOSIS — M51.9 LUMBAR DISC DISEASE: ICD-10-CM

## 2025-05-02 DIAGNOSIS — M43.16 SPONDYLOLISTHESIS OF LUMBAR REGION: ICD-10-CM

## 2025-05-02 DIAGNOSIS — M48.062 SPINAL STENOSIS, LUMBAR REGION, WITH NEUROGENIC CLAUDICATION: Primary | ICD-10-CM

## 2025-05-02 DIAGNOSIS — E66.01 MORBID OBESITY: ICD-10-CM

## 2025-05-02 DIAGNOSIS — M48.062 LUMBAR STENOSIS WITH NEUROGENIC CLAUDICATION: Primary | ICD-10-CM

## 2025-05-02 RX ORDER — OXYCODONE HCL 10 MG/1
10 TABLET, FILM COATED, EXTENDED RELEASE ORAL ONCE
Refills: 0 | OUTPATIENT
Start: 2025-05-02 | End: 2025-05-02

## 2025-05-02 RX ORDER — CHLORHEXIDINE GLUCONATE 40 MG/ML
SOLUTION TOPICAL
Qty: 120 ML | Refills: 0 | Status: SHIPPED | OUTPATIENT
Start: 2025-05-02

## 2025-05-02 RX ORDER — FAMOTIDINE 20 MG/1
20 TABLET, FILM COATED ORAL
OUTPATIENT
Start: 2025-05-02

## 2025-05-02 RX ORDER — IBUPROFEN 800 MG/1
800 TABLET, FILM COATED ORAL ONCE
OUTPATIENT
Start: 2025-05-02 | End: 2025-05-02

## 2025-05-02 RX ORDER — ACETAMINOPHEN 500 MG
1000 TABLET ORAL ONCE
OUTPATIENT
Start: 2025-05-02 | End: 2025-05-02

## 2025-05-02 RX ORDER — IBUPROFEN 200 MG
200 TABLET ORAL EVERY 6 HOURS PRN
COMMUNITY

## 2025-05-02 NOTE — PROGRESS NOTES
Patient: Bonilla Sterling Jr.  : 1960    Primary Care Provider: Christin Yeh APRN    Requesting Provider: As above        History    Chief Complaint: Low back pain.    History of Present Illness: Mr. Sterling is a 65-year-old gentleman who has been experiencing persistent lower back pain since the late , which has significantly impacted his quality of life.  A decade ago he saw my former colleague Dr. Figueroa.  The pain intensifies after approximately 15 minutes of standing, often resulting in tremors. He finds temporary relief by arching his back, but the pain recurs upon straightening. Leaning forward does not alleviate the discomfort. He reports no current leg symptoms. He is currently retired. Despite undergoing chiropractic treatment the pain persists, limiting his ability to stand for extended periods. He also received platelet-rich plasma injections at the [facility], which initially provided relief, but subsequent treatments were ineffective. He is not currently receiving chiropractic care. He had previously experienced sciatic pain radiating down his right leg to his foot, but this symptom has been absent for the past 3 to 4 years.  He recently underwent a lumbar myelogram.  He has had some headache and was seen in the emergency room.  The headache is not positional and is overall better.    Review of Systems   Constitutional:  Negative for activity change, appetite change, chills, diaphoresis, fatigue, fever and unexpected weight change.   HENT:  Negative for congestion, dental problem, drooling, ear discharge, ear pain, facial swelling, hearing loss, mouth sores, nosebleeds, postnasal drip, rhinorrhea, sinus pressure, sneezing, sore throat, tinnitus, trouble swallowing and voice change.    Eyes:  Negative for photophobia, pain, discharge, redness, itching and visual disturbance.   Respiratory:  Negative for apnea, cough, choking, chest tightness, shortness of breath, wheezing and  "stridor.    Cardiovascular:  Negative for chest pain, palpitations and leg swelling.   Gastrointestinal:  Negative for abdominal distention, abdominal pain, anal bleeding, blood in stool, constipation, diarrhea, nausea, rectal pain and vomiting.   Endocrine: Negative for cold intolerance, heat intolerance, polydipsia, polyphagia and polyuria.   Genitourinary:  Negative for decreased urine volume, difficulty urinating, dysuria, enuresis, flank pain, frequency, genital sores, hematuria and urgency.   Musculoskeletal:  Positive for arthralgias and back pain. Negative for gait problem, joint swelling, myalgias, neck pain and neck stiffness.   Skin:  Negative for color change, pallor, rash and wound.   Allergic/Immunologic: Negative for environmental allergies, food allergies and immunocompromised state.   Neurological:  Negative for dizziness, tremors, seizures, syncope, facial asymmetry, speech difficulty, weakness, light-headedness, numbness and headaches.   Hematological:  Negative for adenopathy. Does not bruise/bleed easily.   Psychiatric/Behavioral:  Negative for agitation, behavioral problems, confusion, decreased concentration, dysphoric mood, hallucinations, self-injury, sleep disturbance and suicidal ideas. The patient is not nervous/anxious and is not hyperactive.    All other systems reviewed and are negative.      The patient's past medical history, past surgical history, family history, and social history have been reviewed at length in the electronic medical record.      Physical Exam:   Temp 97.2 °F (36.2 °C) (Temporal)   Ht 175.3 cm (69\")   Wt 133 kg (294 lb)   BMI 43.42 kg/m²   Deferred    Medical Decision Making    Data Review:   (All imaging studies were personally reviewed unless stated otherwise)  MRI of the lumbar spine dated 3/6/2025 demonstrates diffuse degenerative disc disease and facet arthropathy.  There is a low-grade listhesis of L4 on L5 and perhaps even at L3-4.  There is at least " moderate stenosis at multiple levels due to facet and ligamentous overgrowth as well as some degree of epidural lipomatosis.  I think the stenosis is most pronounced at L4-5.     Plain films of the lumbar spine dated 2/4/25 demonstrate the above referenced L4-5 spondylolisthesis.  There is diffuse spondylosis.    CT myelogram demonstrates a complete block at L4-5.  There are several millimeters of movement from the upright to the recumbent imaging at the site of the L4-5 spondylolisthesis.    Diagnosis:   1.  Lumbar stenosis with neurogenic claudication.  2.  L4-5 spondylolisthesis with instability.    Treatment Options:   I discussed treatment options with the patient and his wife.  He is struggling and as such I recommended lumbar decompression with fusion and stabilization at L4-5.  The nature of the procedure as well as the potential risks, complications, limitations, and alternatives to the procedure were discussed at length with the patient and the patient has agreed to proceed with surgery.  The goal of surgery is to improve his symptoms.  It will not necessarily eradicate all of his symptoms.      Scribed for Yang Whitley MD by Lakeshia Holt CMA on 5/2/2025 15:46 EDT       I, Dr. Whitley, personally performed the services described in the documentation, as scribed in my presence, and it is both accurate and complete.

## 2025-05-02 NOTE — H&P
Patient: Bonilla Sterling Jr.  : 1960     Primary Care Provider: Christin Yeh APRN     Requesting Provider: As above           History     Chief Complaint: Low back pain.     History of Present Illness: Mr. Sterling is a 65-year-old gentleman who has been experiencing persistent lower back pain since the late , which has significantly impacted his quality of life.  A decade ago he saw my former colleague Dr. Figueroa.  The pain intensifies after approximately 15 minutes of standing, often resulting in tremors. He finds temporary relief by arching his back, but the pain recurs upon straightening. Leaning forward does not alleviate the discomfort. He reports no current leg symptoms. He is currently retired. Despite undergoing chiropractic treatment the pain persists, limiting his ability to stand for extended periods. He also received platelet-rich plasma injections at the [facility], which initially provided relief, but subsequent treatments were ineffective. He is not currently receiving chiropractic care. He had previously experienced sciatic pain radiating down his right leg to his foot, but this symptom has been absent for the past 3 to 4 years.  He recently underwent a lumbar myelogram.  He has had some headache and was seen in the emergency room.  The headache is not positional and is overall better.     Review of Systems   Constitutional:  Negative for activity change, appetite change, chills, diaphoresis, fatigue, fever and unexpected weight change.   HENT:  Negative for congestion, dental problem, drooling, ear discharge, ear pain, facial swelling, hearing loss, mouth sores, nosebleeds, postnasal drip, rhinorrhea, sinus pressure, sneezing, sore throat, tinnitus, trouble swallowing and voice change.    Eyes:  Negative for photophobia, pain, discharge, redness, itching and visual disturbance.   Respiratory:  Negative for apnea, cough, choking, chest tightness, shortness of breath,  wheezing and stridor.    Cardiovascular:  Negative for chest pain, palpitations and leg swelling.   Gastrointestinal:  Negative for abdominal distention, abdominal pain, anal bleeding, blood in stool, constipation, diarrhea, nausea, rectal pain and vomiting.   Endocrine: Negative for cold intolerance, heat intolerance, polydipsia, polyphagia and polyuria.   Genitourinary:  Negative for decreased urine volume, difficulty urinating, dysuria, enuresis, flank pain, frequency, genital sores, hematuria and urgency.   Musculoskeletal:  Positive for arthralgias and back pain. Negative for gait problem, joint swelling, myalgias, neck pain and neck stiffness.   Skin:  Negative for color change, pallor, rash and wound.   Allergic/Immunologic: Negative for environmental allergies, food allergies and immunocompromised state.   Neurological:  Negative for dizziness, tremors, seizures, syncope, facial asymmetry, speech difficulty, weakness, light-headedness, numbness and headaches.   Hematological:  Negative for adenopathy. Does not bruise/bleed easily.   Psychiatric/Behavioral:  Negative for agitation, behavioral problems, confusion, decreased concentration, dysphoric mood, hallucinations, self-injury, sleep disturbance and suicidal ideas. The patient is not nervous/anxious and is not hyperactive.    All other systems reviewed and are negative.        The patient's past medical history, past surgical history, family history, and social history have been reviewed at length in the electronic medical record.     Past Medical History:   Diagnosis Date    Acid reflux     Anxiety     Arthritis 1990    Cervical disc disorder 1980    Bulging disc    Chronic pain disorder     Back     CTS (carpal tunnel syndrome) 2023    Elevated cholesterol     Gout     HL (hearing loss) 2022    Hypertension     Low back pain     Lumbosacral disc disease 1980    Bulging disc    Memory loss     Have an appointment to confirm    Peripheral neuropathy      Sinus headache     Sleep apnea     Thoracic disc disorder     Bulging disc    Vision loss     Wears glasses     Wears partial dentures     Full upper plate - advised no adhesives DOS     Past Surgical History:   Procedure Laterality Date    BREAST CYST ASPIRATION  2023    CARPAL TUNNEL RELEASE      COLONOSCOPY      COLONOSCOPY N/A 2020    Procedure: COLONOSCOPY;  Surgeon: Chantale Naik MD;  Location: [facility] ENDOSCOPY;  Service: Gastroenterology;  Laterality: N/A;    MOUTH SURGERY      Oral extractions    WRIST FRACTURE SURGERY Right      Family History   Problem Relation Age of Onset    Diabetes Mother     Hypertension Mother     Breast cancer Mother     Dementia Mother     Alzheimer's disease Mother     Arthritis Mother         Knee replacement    Hyperlipidemia Mother     Diabetes Father     Hypertension Father     Dementia Father     Alzheimer's disease Father     No Known Problems Sister     No Known Problems Sister     No Known Problems Sister     No Known Problems Sister     No Known Problems Sister     No Known Problems Brother     No Known Problems Brother     No Known Problems Brother     Colon cancer Neg Hx      Social History     Socioeconomic History    Marital status:    Tobacco Use    Smoking status: Former     Current packs/day: 0.00     Average packs/day: 1 pack/day for 20.0 years (20.0 ttl pk-yrs)     Types: Cigarettes     Start date: 1989     Quit date:      Years since quittin.3     Passive exposure: Past    Smokeless tobacco: Never   Vaping Use    Vaping status: Never Used   Substance and Sexual Activity    Alcohol use: Not Currently     Comment: quit 10 years ago     Drug use: No    Sexual activity: Yes     Partners: Female           No Known Allergies    Current Outpatient Medications on File Prior to Visit   Medication Sig Dispense Refill    acetaminophen (TYLENOL) 500 MG tablet Take 1 tablet by mouth Every 6 (Six) Hours As Needed for Mild Pain.    "   allopurinol (ZYLOPRIM) 100 MG tablet Take 1 tablet by mouth Daily.      amitriptyline (ELAVIL) 10 MG tablet Take 2 tablets by mouth Every Night. 60 tablet 2    aspirin-acetaminophen-caffeine (EXCEDRIN MIGRAINE) 250-250-65 MG per tablet Take 1 tablet by mouth Every 6 (Six) Hours As Needed.      atorvastatin (LIPITOR) 20 MG tablet Take 1 tablet by mouth Daily.      donepezil (Aricept) 10 MG tablet Take 1 tablet by mouth Every Night. 30 tablet 5    DULoxetine (CYMBALTA) 60 MG capsule Take 1 capsule by mouth Every Morning. 30 capsule 5    hydrochlorothiazide (HYDRODIURIL) 25 MG tablet Take 1 tablet by mouth Daily.      ibuprofen (ADVIL,MOTRIN) 200 MG tablet Take 1 tablet by mouth Every 6 (Six) Hours As Needed.      lisinopril (PRINIVIL,ZESTRIL) 20 MG tablet Take 1 tablet by mouth 2 (two) times a day.      Multiple Vitamins-Minerals (CENTRUM SILVER PO) Take 1 tablet by mouth Daily.      tadalafil (CIALIS) 5 MG tablet Take 1 tablet by mouth Daily.      vitamin C (ASCORBIC ACID) 500 MG tablet Take 1 tablet by mouth Daily.       No current facility-administered medications on file prior to visit.          Physical Exam:   Temp 97.2 °F (36.2 °C) (Temporal)   Ht 175.3 cm (69\")   Wt 133 kg (294 lb)   BMI 43.42 kg/m²   Deferred     Medical Decision Making     Data Review:   (All imaging studies were personally reviewed unless stated otherwise)  MRI of the lumbar spine dated 3/6/2025 demonstrates diffuse degenerative disc disease and facet arthropathy.  There is a low-grade listhesis of L4 on L5 and perhaps even at L3-4.  There is at least moderate stenosis at multiple levels due to facet and ligamentous overgrowth as well as some degree of epidural lipomatosis.  I think the stenosis is most pronounced at L4-5.     Plain films of the lumbar spine dated 2/4/25 demonstrate the above referenced L4-5 spondylolisthesis.  There is diffuse spondylosis.     CT myelogram demonstrates a complete block at L4-5.  There are several " millimeters of movement from the upright to the recumbent imaging at the site of the L4-5 spondylolisthesis.     Diagnosis:   1.  Lumbar stenosis with neurogenic claudication.  2.  L4-5 spondylolisthesis with instability.     Treatment Options:   I discussed treatment options with the patient and his wife.  He is struggling and as such I recommended lumbar decompression with fusion and stabilization at L4-5.  The nature of the procedure as well as the potential risks, complications, limitations, and alternatives to the procedure were discussed at length with the patient and the patient has agreed to proceed with surgery.  The goal of surgery is to improve his symptoms.  It will not necessarily eradicate all of his symptoms.

## 2025-05-27 ENCOUNTER — OFFICE VISIT (OUTPATIENT)
Dept: NEUROLOGY | Facility: CLINIC | Age: 65
End: 2025-05-27
Payer: MEDICARE

## 2025-05-27 VITALS
WEIGHT: 293 LBS | TEMPERATURE: 97.9 F | HEIGHT: 69 IN | SYSTOLIC BLOOD PRESSURE: 120 MMHG | HEART RATE: 95 BPM | OXYGEN SATURATION: 100 % | DIASTOLIC BLOOD PRESSURE: 82 MMHG | BODY MASS INDEX: 43.4 KG/M2

## 2025-05-27 DIAGNOSIS — R41.89 SUBJECTIVE MEMORY COMPLAINTS: ICD-10-CM

## 2025-05-27 RX ORDER — DONEPEZIL HYDROCHLORIDE 10 MG/1
10 TABLET, FILM COATED ORAL NIGHTLY
Qty: 90 TABLET | Refills: 1 | Status: SHIPPED | OUTPATIENT
Start: 2025-05-27

## 2025-05-27 NOTE — PROGRESS NOTES
Follow Up Office Visit      Patient Name: Bonilla Sterling Jr.  : 1960   MRN: [id number]     Chief Complaint:    Chief Complaint   Patient presents with    Follow-up     Memory concerns       History of Present Illness: Bonilla Sterling Jr. is a rt hand dominant 65 y.o. male who is here today to follow up with memory concerns.  He is accompanied to the office visit today by his wife.  He is scheduled to have lumbar fusion and decompression on  by Dr. Whitley.   His wife states he is repeating himself less. He is also able to recall things easier. He is currently on donepezil 10 mg daily.  He remains independent with his ADLs and continues to drive.  He does note that he stopped wearing his CPAP at night because of excessive saliva.  - MMSE score 27  -He was able to name 21 animals in 60 seconds. (He completed 1.5 yr college)    CT Head wo on 25 FINDINGS: There is no CT evidence of hemorrhage. There is no mass, mass  effect or midline shift.  There is no hydrocephalus. The paranasal  sinuses are clear. Bone windows reveal no acute osseous abnormalities.     IMPRESSION:  No acute intracranial process.       History of Present Illness has been carried forward from his 2024 office note as follows: Bonilla Sterling Jr. is a 64 y.o. male who is here today to follow up with memory concerns.  He is accompanied to the office visit today by his wife.  He was started on donepezil 5 mg daily at last office visit after discussion with patient and reviewing his neuropsych testing which was consistent with mild cognitive impairment.  Patient and his wife both state that they have noted some improvement since the initiation of donepezil.  He is repeating himself less often.  He is showing improvement in memory with recent conversations.  He is not misplacing things.  He continues to drive independently and reports no accidents, he is able to navigate easily and safely per his wife's report.  He is independent  with his ADLs.  He is able to manage his medications independently and correctly.  He has been wearing his CPAP and reports he is compliant with this.      -MRI of the brain with and without contrast on 9/11/2024 as follows:  FINDINGS: Diffusion sequences show no signal abnormalities to indicate  acute infarct or other process.     Brain parenchyma displays no evidence of mass, hemorrhage or edema.  There is moderate atrophy, mildly pronounced for the patient's age. Mild  chronic small vessel ischemic change is age-appropriate. No extra-axial  abnormal findings are seen. The ventricles and cisterns appear normal.  Paranasal sinuses and mastoid air cells are clear. No abnormal enhancing  lesions are identified on the post infusion images.     IMPRESSION:  Atrophy and chronic changes without acute intracranial  process.     No abnormal enhancement identified.      Pertinent Medical History: Hypertension, hypercholesterolemia, chronic back pain, anxiety, gout, MIO  Mother and father had dementia.      Subjective      Review of Systems:   Review of Systems   Genitourinary:  Positive for urgency.   Neurological:  Positive for memory problem.       I have reviewed and the following portions of the patient's history were updated as appropriate: past family history, past medical history, past social history, past surgical history and problem list.    Medications:     Current Outpatient Medications:     allopurinol (ZYLOPRIM) 100 MG tablet, Take 1 tablet by mouth Daily., Disp: , Rfl:     amitriptyline (ELAVIL) 10 MG tablet, Take 2 tablets by mouth Every Night., Disp: 60 tablet, Rfl: 2    atorvastatin (LIPITOR) 20 MG tablet, Take 1 tablet by mouth Daily., Disp: , Rfl:     donepezil (Aricept) 10 MG tablet, Take 1 tablet by mouth Every Night., Disp: 90 tablet, Rfl: 1    DULoxetine (CYMBALTA) 60 MG capsule, Take 1 capsule by mouth Every Morning., Disp: 30 capsule, Rfl: 5    hydrochlorothiazide (HYDRODIURIL) 25 MG tablet, Take 1  "tablet by mouth Daily., Disp: , Rfl:     lisinopril (PRINIVIL,ZESTRIL) 20 MG tablet, Take 1 tablet by mouth 2 (two) times a day., Disp: , Rfl:     Multiple Vitamins-Minerals (CENTRUM SILVER PO), Take 1 tablet by mouth Daily., Disp: , Rfl:     vitamin C (ASCORBIC ACID) 500 MG tablet, Take 1 tablet by mouth Daily., Disp: , Rfl:     Allergies:   No Known Allergies    Objective     Physical Exam:  Vital Signs:   Vitals:    05/27/25 1347   BP: 120/82   Pulse: 95   Temp: 97.9 °F (36.6 °C)   SpO2: 100%   Weight: 133 kg (293 lb)   Height: 175.3 cm (69\")   PainSc: 3    PainLoc: Back     Body mass index is 43.27 kg/m².    Physical Exam  Constitutional:       Appearance: Normal appearance.   HENT:      Head: Normocephalic.   Eyes:      Conjunctiva/sclera: Conjunctivae normal.   Pulmonary:      Effort: Pulmonary effort is normal.   Musculoskeletal:         General: Normal range of motion.   Skin:     General: Skin is warm and dry.   Neurological:      General: No focal deficit present.      Mental Status: He is alert and oriented to person, place, and time.      Cranial Nerves: Cranial nerves 2-12 are intact. No dysarthria.      Motor: Motor function is intact.      Gait: Gait abnormal.      Comments: Forward flexed antalgic gait   Psychiatric:         Attention and Perception: Attention normal.         Mood and Affect: Mood and affect normal.         Speech: Speech normal.         Behavior: Behavior normal. Behavior is cooperative.         Thought Content: Thought content normal.         Cognition and Memory: Cognition normal.         Judgment: Judgment normal.         Neurological Exam  Mental Status  Alert. Oriented to person, place, time and situation. Oriented to person, place, and time. Recalls 3 of 3 objects immediately. At 10 minutes recalls 3 of 3 objects. Able to copy figure. Speech is normal. no dysarthria present. Able to name objects, repeat, read and write. Follows three-step commands. Difficulty spelling words " backwards. When asked to spell the word world backwards he was only able to get the D and L correct. Fund of knowledge is appropriate for level of education. Apraxia absent. MMSE score: 27.    Gait   Abnormal gait.      Assessment / Plan      Assessment/Plan:   Diagnoses and all orders for this visit:    1. Subjective memory complaints  -     donepezil (Aricept) 10 MG tablet; Take 1 tablet by mouth Every Night.  Dispense: 90 tablet; Refill: 1           This is a pleasant 65-year-old male patient here to follow-up with memory concerns.  He is accompanied to the office visit today by his wife.  He is doing well on donepezil 10 mg.  Refills given x 6 months.  His wife also feels that his memory seems to have improved.  He is getting ready to undergo lumbar spine surgery.  We discussed changes with memory after anesthesia.  His MMSE score today was 27.     - At the end of the office visit he also noted that he has urinary urgency and often cannot make it to the bathroom in time.  He is also been having some tenderness and pain in his bilateral breasts.  Instructed patient to follow-up with PCP regarding this.  We discussed problems with his lower back could be causing some of these issues with his urinary urgency as well as his prostate.  Patient will call in the interim if he has any questions or concerns or changes.    Follow Up:   Return in about 6 months (around 11/27/2025).    DOUG Gonzalez, FNP-[facility]

## 2025-06-09 ENCOUNTER — PRE-ADMISSION TESTING (OUTPATIENT)
Dept: PREADMISSION TESTING | Facility: HOSPITAL | Age: 65
End: 2025-06-09
Payer: MEDICARE

## 2025-06-09 VITALS — HEIGHT: 69 IN | BODY MASS INDEX: 43.53 KG/M2 | WEIGHT: 293.87 LBS

## 2025-06-09 DIAGNOSIS — M48.062 LUMBAR STENOSIS WITH NEUROGENIC CLAUDICATION: ICD-10-CM

## 2025-06-09 LAB
DEPRECATED RDW RBC AUTO: 44.5 FL (ref 37–54)
ERYTHROCYTE [DISTWIDTH] IN BLOOD BY AUTOMATED COUNT: 14.4 % (ref 12.3–15.4)
HBA1C MFR BLD: 6.2 % (ref 4.8–5.6)
HCT VFR BLD AUTO: 45 % (ref 37.5–51)
HGB BLD-MCNC: 14 G/DL (ref 13–17.7)
MCH RBC QN AUTO: 26.6 PG (ref 26.6–33)
MCHC RBC AUTO-ENTMCNC: 31.1 G/DL (ref 31.5–35.7)
MCV RBC AUTO: 85.4 FL (ref 79–97)
PLATELET # BLD AUTO: 189 10*3/MM3 (ref 140–450)
PMV BLD AUTO: 9.5 FL (ref 6–12)
POTASSIUM SERPL-SCNC: 4.7 MMOL/L (ref 3.5–5.2)
RBC # BLD AUTO: 5.27 10*6/MM3 (ref 4.14–5.8)
WBC NRBC COR # BLD AUTO: 9.84 10*3/MM3 (ref 3.4–10.8)

## 2025-06-09 PROCEDURE — 83036 HEMOGLOBIN GLYCOSYLATED A1C: CPT

## 2025-06-09 PROCEDURE — 93005 ELECTROCARDIOGRAM TRACING: CPT

## 2025-06-09 PROCEDURE — 87081 CULTURE SCREEN ONLY: CPT

## 2025-06-09 PROCEDURE — 84132 ASSAY OF SERUM POTASSIUM: CPT

## 2025-06-09 PROCEDURE — 36415 COLL VENOUS BLD VENIPUNCTURE: CPT

## 2025-06-09 PROCEDURE — 85027 COMPLETE CBC AUTOMATED: CPT

## 2025-06-09 NOTE — PAT
Patient viewed general PAT education video as instructed in their preoperative information received from their surgeon.  Patient stated the general PAT education video was viewed in its entirety and survey completed.  Copies of PAT general education handouts (Incentive Spirometry, Meds to Beds Program, Patient Belongings, Pre-op skin preparation instructions, Blood Glucose testing, Visitor policy, Surgery FAQ, Code H) distributed to patient if not printed. Education related to the PAT pass and skin preparation for surgery (if applicable) completed in PAT as a reinforcement to PAT education video. Patient instructed to return PAT pass provided today as well as completed skin preparation sheet (if applicable) on the day of procedure.     Additionally if patient had not viewed video yet but intended to view it at home or in our waiting area, then referred them to the handout with QR code/link provided during PAT visit.  Encouraged patient/family to read PAT general education handouts thoroughly and notify PAT staff with any questions or concerns. Patient verbalized understanding of all information and priority content.    An arrival time for procedure was not provided during PAT visit. If patient had any questions or concerns about their arrival time, they were instructed to contact their surgeon/physician.  Additionally, if the patient referred to an arrival time that was acquired from their my chart account, patient was encouraged to verify that time with their surgeon/physician. Arrival times are NOT provided in Pre Admission Testing Department.    Patient denies any current skin issues.     Bactroban (if prescribed) and Chlorhexidine Prescription prescribed by physician before PAT visit.  Verified with patient that medication(s) were picked up from their pharmacy.  Written instructions given to patient during PAT visit.  Patient/family also instructed to complete skin prep checklist and return the checklist on the  day of surgery to preoperative staff.  Patient/family verbalized understanding.    Patient to apply Chlorhexadine wipes  to surgical area (as instructed) the night before procedure and the AM of procedure. Wipes provided.    Patient instructed to drink 20 ounces of Gatorade or Gatorlyte (if diabetic) and it needs to be completed 1 hour (for Main OR patients) or 2 hours (scheduled  section & [facility] patients) before given arrival time for procedure (NO RED Gatorade and NO Gatorade Zero).    Patient verbalized understanding.    Per Anesthesia Request, patient instructed not to take their ACE/ARB medications on the AM of surgery.

## 2025-06-10 LAB — MRSA SPEC QL CULT: NORMAL

## 2025-06-10 RX ORDER — CHLORHEXIDINE GLUCONATE 40 MG/ML
1 SOLUTION TOPICAL DAILY
Qty: 120 ML | Refills: 0 | Status: ON HOLD | OUTPATIENT
Start: 2025-06-10 | End: 2025-06-16

## 2025-06-11 LAB
QT INTERVAL: 348 MS
QTC INTERVAL: 428 MS

## 2025-06-13 ENCOUNTER — ANESTHESIA EVENT (OUTPATIENT)
Dept: PERIOP | Facility: HOSPITAL | Age: 65
End: 2025-06-13
Payer: MEDICARE

## 2025-06-13 RX ORDER — FAMOTIDINE 10 MG/ML
20 INJECTION, SOLUTION INTRAVENOUS ONCE
Status: CANCELLED | OUTPATIENT
Start: 2025-06-13 | End: 2025-06-13

## 2025-06-15 DIAGNOSIS — F33.2 SEVERE EPISODE OF RECURRENT MAJOR DEPRESSIVE DISORDER, WITHOUT PSYCHOTIC FEATURES: ICD-10-CM

## 2025-06-15 DIAGNOSIS — F41.1 GENERALIZED ANXIETY DISORDER: ICD-10-CM

## 2025-06-16 ENCOUNTER — APPOINTMENT (OUTPATIENT)
Dept: GENERAL RADIOLOGY | Facility: HOSPITAL | Age: 65
End: 2025-06-16
Payer: MEDICARE

## 2025-06-16 ENCOUNTER — ANESTHESIA (OUTPATIENT)
Dept: PERIOP | Facility: HOSPITAL | Age: 65
End: 2025-06-16
Payer: MEDICARE

## 2025-06-16 ENCOUNTER — HOSPITAL ENCOUNTER (OUTPATIENT)
Facility: HOSPITAL | Age: 65
Discharge: HOME | End: 2025-06-19
Attending: NEUROLOGICAL SURGERY | Admitting: NEUROLOGICAL SURGERY
Payer: MEDICARE

## 2025-06-16 DIAGNOSIS — M48.062 LUMBAR STENOSIS WITH NEUROGENIC CLAUDICATION: ICD-10-CM

## 2025-06-16 PROCEDURE — 25810000003 LACTATED RINGERS PER 1000 ML: Performed by: ANESTHESIOLOGY

## 2025-06-16 PROCEDURE — 25810000003 SODIUM CHLORIDE 0.9 % SOLUTION 250 ML FLEX CONT: Performed by: NURSE ANESTHETIST, CERTIFIED REGISTERED

## 2025-06-16 PROCEDURE — 63052 LAM FACETC/FRMT ARTHRD LUM 1: CPT | Performed by: PHYSICIAN ASSISTANT

## 2025-06-16 PROCEDURE — 25010000002 PROPOFOL 10 MG/ML EMULSION: Performed by: NURSE ANESTHETIST, CERTIFIED REGISTERED

## 2025-06-16 PROCEDURE — 25010000002 HYDROMORPHONE 1 MG/ML SOLUTION

## 2025-06-16 PROCEDURE — 63052 LAM FACETC/FRMT ARTHRD LUM 1: CPT | Performed by: NEUROLOGICAL SURGERY

## 2025-06-16 PROCEDURE — C1713 ANCHOR/SCREW BN/BN,TIS/BN: HCPCS | Performed by: NEUROLOGICAL SURGERY

## 2025-06-16 PROCEDURE — 25010000002 CEFAZOLIN 3 G RECONSTITUTED SOLUTION 1 EACH VIAL: Performed by: NEUROLOGICAL SURGERY

## 2025-06-16 PROCEDURE — 61783 SCAN PROC SPINAL: CPT | Performed by: NEUROLOGICAL SURGERY

## 2025-06-16 PROCEDURE — 22853 INSJ BIOMECHANICAL DEVICE: CPT | Performed by: NEUROLOGICAL SURGERY

## 2025-06-16 PROCEDURE — 25010000002 LIDOCAINE PF 1% 1 % SOLUTION: Performed by: ANESTHESIOLOGY

## 2025-06-16 PROCEDURE — 25010000002 SUGAMMADEX 200 MG/2ML SOLUTION: Performed by: NURSE ANESTHETIST, CERTIFIED REGISTERED

## 2025-06-16 PROCEDURE — 76000 FLUOROSCOPY <1 HR PHYS/QHP: CPT

## 2025-06-16 PROCEDURE — 25810000003 LACTATED RINGERS PER 1000 ML: Performed by: NEUROLOGICAL SURGERY

## 2025-06-16 PROCEDURE — 25010000002 PHENYLEPHRINE 10 MG/ML SOLUTION 1 ML VIAL: Performed by: NURSE ANESTHETIST, CERTIFIED REGISTERED

## 2025-06-16 PROCEDURE — 22853 INSJ BIOMECHANICAL DEVICE: CPT | Performed by: PHYSICIAN ASSISTANT

## 2025-06-16 PROCEDURE — 25010000002 FENTANYL CITRATE (PF) 100 MCG/2ML SOLUTION: Performed by: NURSE ANESTHETIST, CERTIFIED REGISTERED

## 2025-06-16 PROCEDURE — 22630 ARTHRD PST TQ 1NTRSPC LUM: CPT | Performed by: NEUROLOGICAL SURGERY

## 2025-06-16 PROCEDURE — 25010000002 VANCOMYCIN 1 G RECONSTITUTED SOLUTION: Performed by: NEUROLOGICAL SURGERY

## 2025-06-16 PROCEDURE — 22630 ARTHRD PST TQ 1NTRSPC LUM: CPT | Performed by: PHYSICIAN ASSISTANT

## 2025-06-16 PROCEDURE — S0260 H&P FOR SURGERY: HCPCS | Performed by: PHYSICIAN ASSISTANT

## 2025-06-16 PROCEDURE — 25010000002 DEXAMETHASONE PER 1 MG: Performed by: NURSE ANESTHETIST, CERTIFIED REGISTERED

## 2025-06-16 PROCEDURE — 25810000003 SODIUM CHLORIDE PER 500 ML: Performed by: NEUROLOGICAL SURGERY

## 2025-06-16 PROCEDURE — 25010000002 ESMOLOL 100 MG/10ML SOLUTION: Performed by: NURSE ANESTHETIST, CERTIFIED REGISTERED

## 2025-06-16 PROCEDURE — 25010000002 LIDOCAINE PF 1% 1 % SOLUTION: Performed by: NURSE ANESTHETIST, CERTIFIED REGISTERED

## 2025-06-16 PROCEDURE — 22840 INSERT SPINE FIXATION DEVICE: CPT | Performed by: PHYSICIAN ASSISTANT

## 2025-06-16 PROCEDURE — 22840 INSERT SPINE FIXATION DEVICE: CPT | Performed by: NEUROLOGICAL SURGERY

## 2025-06-16 PROCEDURE — 25010000002 ONDANSETRON PER 1 MG: Performed by: NURSE ANESTHETIST, CERTIFIED REGISTERED

## 2025-06-16 DEVICE — ROD 1555501040 5.5 CCM NS CURV 40MM
Type: IMPLANTABLE DEVICE | Site: BACK | Status: FUNCTIONAL
Brand: CD HORIZON® SPINAL SYSTEM

## 2025-06-16 DEVICE — ROD 1555501035 5.5 CCM NS CURV 35MM
Type: IMPLANTABLE DEVICE | Site: BACK | Status: FUNCTIONAL
Brand: CD HORIZON® SPINAL SYSTEM

## 2025-06-16 DEVICE — FLOSEAL WITH RECOTHROM - 10ML.
Type: IMPLANTABLE DEVICE | Site: BACK | Status: FUNCTIONAL
Brand: FLOSEAL HEMOSTATIC MATRIX

## 2025-06-16 DEVICE — MAS 55840027550T 5.5/6.0 FENSTRTD 7.5X50
Type: IMPLANTABLE DEVICE | Site: BACK | Status: FUNCTIONAL
Brand: CD HORIZON® FENESTRATED SCREW SET

## 2025-06-16 DEVICE — IMPLANTABLE DEVICE
Type: IMPLANTABLE DEVICE | Site: BACK | Status: FUNCTIONAL
Brand: SURGIFOAM® ABSORBABLE GELATIN SPONGE, U.S.P.

## 2025-06-16 DEVICE — SET SCREW 5540030 5.5 TI NS BRK OFF
Type: IMPLANTABLE DEVICE | Site: BACK | Status: FUNCTIONAL
Brand: CD HORIZON® SPINAL SYSTEM

## 2025-06-16 DEVICE — SPACR TLIF/DLIF ADAPTIX 24X12MM: Type: IMPLANTABLE DEVICE | Site: BACK | Status: FUNCTIONAL

## 2025-06-16 DEVICE — SSC BONE WAX
Type: IMPLANTABLE DEVICE | Site: BACK | Status: FUNCTIONAL
Brand: SSC BONE WAX

## 2025-06-16 DEVICE — MAGNETOS FLEX MATRIX  2.35CC 0.25-1MM SMALL
Type: IMPLANTABLE DEVICE | Site: BACK | Status: FUNCTIONAL
Brand: MAGNETOS

## 2025-06-16 RX ORDER — OXYCODONE AND ACETAMINOPHEN 7.5; 325 MG/1; MG/1
1 TABLET ORAL EVERY 4 HOURS PRN
Refills: 0 | Status: DISCONTINUED | OUTPATIENT
Start: 2025-06-16 | End: 2025-06-19 | Stop reason: HOSPADM

## 2025-06-16 RX ORDER — IPRATROPIUM BROMIDE AND ALBUTEROL SULFATE 2.5; .5 MG/3ML; MG/3ML
3 SOLUTION RESPIRATORY (INHALATION) ONCE AS NEEDED
Status: DISCONTINUED | OUTPATIENT
Start: 2025-06-16 | End: 2025-06-16

## 2025-06-16 RX ORDER — MORPHINE SULFATE 4 MG/ML
4 INJECTION, SOLUTION INTRAMUSCULAR; INTRAVENOUS EVERY 4 HOURS PRN
Status: DISCONTINUED | OUTPATIENT
Start: 2025-06-16 | End: 2025-06-19 | Stop reason: HOSPADM

## 2025-06-16 RX ORDER — NALOXONE HCL 0.4 MG/ML
0.4 VIAL (ML) INJECTION AS NEEDED
Status: DISCONTINUED | OUTPATIENT
Start: 2025-06-16 | End: 2025-06-16

## 2025-06-16 RX ORDER — PROMETHAZINE HYDROCHLORIDE 12.5 MG/1
12.5 TABLET ORAL EVERY 6 HOURS PRN
Status: DISCONTINUED | OUTPATIENT
Start: 2025-06-16 | End: 2025-06-19 | Stop reason: HOSPADM

## 2025-06-16 RX ORDER — HYDROMORPHONE HYDROCHLORIDE 1 MG/ML
0.5 INJECTION, SOLUTION INTRAMUSCULAR; INTRAVENOUS; SUBCUTANEOUS
Status: DISCONTINUED | OUTPATIENT
Start: 2025-06-16 | End: 2025-06-16

## 2025-06-16 RX ORDER — SODIUM CHLORIDE, SODIUM LACTATE, POTASSIUM CHLORIDE, CALCIUM CHLORIDE 600; 310; 30; 20 MG/100ML; MG/100ML; MG/100ML; MG/100ML
90 INJECTION, SOLUTION INTRAVENOUS CONTINUOUS
Status: ACTIVE | OUTPATIENT
Start: 2025-06-16 | End: 2025-06-17

## 2025-06-16 RX ORDER — ACETAMINOPHEN 500 MG
1000 TABLET ORAL ONCE
Status: COMPLETED | OUTPATIENT
Start: 2025-06-16 | End: 2025-06-16

## 2025-06-16 RX ORDER — BISACODYL 5 MG/1
5 TABLET, DELAYED RELEASE ORAL DAILY PRN
Status: DISCONTINUED | OUTPATIENT
Start: 2025-06-16 | End: 2025-06-19 | Stop reason: HOSPADM

## 2025-06-16 RX ORDER — DONEPEZIL HYDROCHLORIDE 10 MG/1
10 TABLET, FILM COATED ORAL NIGHTLY
Status: DISCONTINUED | OUTPATIENT
Start: 2025-06-16 | End: 2025-06-19 | Stop reason: HOSPADM

## 2025-06-16 RX ORDER — SODIUM CHLORIDE 0.9 % (FLUSH) 0.9 %
3-10 SYRINGE (ML) INJECTION AS NEEDED
Status: DISCONTINUED | OUTPATIENT
Start: 2025-06-16 | End: 2025-06-16

## 2025-06-16 RX ORDER — SODIUM CHLORIDE 0.9 % (FLUSH) 0.9 %
10 SYRINGE (ML) INJECTION EVERY 12 HOURS SCHEDULED
Status: DISCONTINUED | OUTPATIENT
Start: 2025-06-16 | End: 2025-06-19 | Stop reason: HOSPADM

## 2025-06-16 RX ORDER — ONDANSETRON 2 MG/ML
INJECTION INTRAMUSCULAR; INTRAVENOUS AS NEEDED
Status: DISCONTINUED | OUTPATIENT
Start: 2025-06-16 | End: 2025-06-16 | Stop reason: SURG

## 2025-06-16 RX ORDER — PROPOFOL 10 MG/ML
VIAL (ML) INTRAVENOUS AS NEEDED
Status: DISCONTINUED | OUTPATIENT
Start: 2025-06-16 | End: 2025-06-16 | Stop reason: SURG

## 2025-06-16 RX ORDER — PROMETHAZINE HYDROCHLORIDE 25 MG/1
25 SUPPOSITORY RECTAL ONCE AS NEEDED
Status: DISCONTINUED | OUTPATIENT
Start: 2025-06-16 | End: 2025-06-16

## 2025-06-16 RX ORDER — LIDOCAINE HYDROCHLORIDE 10 MG/ML
0.5 INJECTION, SOLUTION EPIDURAL; INFILTRATION; INTRACAUDAL; PERINEURAL ONCE AS NEEDED
Status: COMPLETED | OUTPATIENT
Start: 2025-06-16 | End: 2025-06-16

## 2025-06-16 RX ORDER — OXYCODONE HCL 10 MG/1
10 TABLET, FILM COATED, EXTENDED RELEASE ORAL ONCE
Status: COMPLETED | OUTPATIENT
Start: 2025-06-16 | End: 2025-06-16

## 2025-06-16 RX ORDER — LIDOCAINE HYDROCHLORIDE 10 MG/ML
INJECTION, SOLUTION EPIDURAL; INFILTRATION; INTRACAUDAL; PERINEURAL AS NEEDED
Status: DISCONTINUED | OUTPATIENT
Start: 2025-06-16 | End: 2025-06-16 | Stop reason: SURG

## 2025-06-16 RX ORDER — HYDROCODONE BITARTRATE AND ACETAMINOPHEN 5; 325 MG/1; MG/1
1 TABLET ORAL ONCE AS NEEDED
Status: DISCONTINUED | OUTPATIENT
Start: 2025-06-16 | End: 2025-06-16

## 2025-06-16 RX ORDER — SODIUM CHLORIDE 0.9 % (FLUSH) 0.9 %
3 SYRINGE (ML) INJECTION EVERY 12 HOURS SCHEDULED
Status: DISCONTINUED | OUTPATIENT
Start: 2025-06-16 | End: 2025-06-16

## 2025-06-16 RX ORDER — SODIUM CHLORIDE 9 MG/ML
INJECTION, SOLUTION INTRAVENOUS AS NEEDED
Status: DISCONTINUED | OUTPATIENT
Start: 2025-06-16 | End: 2025-06-16 | Stop reason: HOSPADM

## 2025-06-16 RX ORDER — PROMETHAZINE HYDROCHLORIDE 12.5 MG/1
12.5 SUPPOSITORY RECTAL EVERY 6 HOURS PRN
Status: DISCONTINUED | OUTPATIENT
Start: 2025-06-16 | End: 2025-06-19 | Stop reason: HOSPADM

## 2025-06-16 RX ORDER — ONDANSETRON 4 MG/1
4 TABLET, ORALLY DISINTEGRATING ORAL EVERY 6 HOURS PRN
Status: DISCONTINUED | OUTPATIENT
Start: 2025-06-16 | End: 2025-06-19 | Stop reason: HOSPADM

## 2025-06-16 RX ORDER — ESMOLOL HYDROCHLORIDE 10 MG/ML
INJECTION INTRAVENOUS AS NEEDED
Status: DISCONTINUED | OUTPATIENT
Start: 2025-06-16 | End: 2025-06-16 | Stop reason: SURG

## 2025-06-16 RX ORDER — ONDANSETRON 2 MG/ML
4 INJECTION INTRAMUSCULAR; INTRAVENOUS EVERY 6 HOURS PRN
Status: DISCONTINUED | OUTPATIENT
Start: 2025-06-16 | End: 2025-06-19 | Stop reason: HOSPADM

## 2025-06-16 RX ORDER — SODIUM CHLORIDE 9 MG/ML
9 INJECTION, SOLUTION INTRAVENOUS AS NEEDED
Status: DISCONTINUED | OUTPATIENT
Start: 2025-06-16 | End: 2025-06-16

## 2025-06-16 RX ORDER — FENTANYL CITRATE 50 UG/ML
INJECTION, SOLUTION INTRAMUSCULAR; INTRAVENOUS AS NEEDED
Status: DISCONTINUED | OUTPATIENT
Start: 2025-06-16 | End: 2025-06-16 | Stop reason: SURG

## 2025-06-16 RX ORDER — SODIUM CHLORIDE 0.9 % (FLUSH) 0.9 %
10 SYRINGE (ML) INJECTION EVERY 12 HOURS SCHEDULED
Status: DISCONTINUED | OUTPATIENT
Start: 2025-06-16 | End: 2025-06-16 | Stop reason: HOSPADM

## 2025-06-16 RX ORDER — POLYETHYLENE GLYCOL 3350 17 G/17G
17 POWDER, FOR SOLUTION ORAL DAILY PRN
Status: DISCONTINUED | OUTPATIENT
Start: 2025-06-16 | End: 2025-06-19 | Stop reason: HOSPADM

## 2025-06-16 RX ORDER — DIPHENHYDRAMINE HCL 25 MG
25 CAPSULE ORAL NIGHTLY PRN
Status: DISCONTINUED | OUTPATIENT
Start: 2025-06-16 | End: 2025-06-19 | Stop reason: HOSPADM

## 2025-06-16 RX ORDER — MIDAZOLAM HYDROCHLORIDE 1 MG/ML
0.5 INJECTION, SOLUTION INTRAMUSCULAR; INTRAVENOUS
Status: DISCONTINUED | OUTPATIENT
Start: 2025-06-16 | End: 2025-06-16 | Stop reason: HOSPADM

## 2025-06-16 RX ORDER — LABETALOL HYDROCHLORIDE 5 MG/ML
5 INJECTION, SOLUTION INTRAVENOUS
Status: DISCONTINUED | OUTPATIENT
Start: 2025-06-16 | End: 2025-06-16

## 2025-06-16 RX ORDER — DEXMEDETOMIDINE HYDROCHLORIDE 100 UG/ML
INJECTION, SOLUTION INTRAVENOUS AS NEEDED
Status: DISCONTINUED | OUTPATIENT
Start: 2025-06-16 | End: 2025-06-16 | Stop reason: SURG

## 2025-06-16 RX ORDER — LISINOPRIL 20 MG/1
20 TABLET ORAL DAILY
Status: DISCONTINUED | OUTPATIENT
Start: 2025-06-16 | End: 2025-06-19 | Stop reason: HOSPADM

## 2025-06-16 RX ORDER — DULOXETIN HYDROCHLORIDE 60 MG/1
60 CAPSULE, DELAYED RELEASE ORAL EVERY MORNING
Status: DISCONTINUED | OUTPATIENT
Start: 2025-06-17 | End: 2025-06-19 | Stop reason: HOSPADM

## 2025-06-16 RX ORDER — VECURONIUM BROMIDE 1 MG/ML
INJECTION, POWDER, FOR SOLUTION INTRAVENOUS AS NEEDED
Status: DISCONTINUED | OUTPATIENT
Start: 2025-06-16 | End: 2025-06-16 | Stop reason: SURG

## 2025-06-16 RX ORDER — MAGNESIUM HYDROXIDE 1200 MG/15ML
LIQUID ORAL AS NEEDED
Status: DISCONTINUED | OUTPATIENT
Start: 2025-06-16 | End: 2025-06-16 | Stop reason: HOSPADM

## 2025-06-16 RX ORDER — HYDROCHLOROTHIAZIDE 25 MG/1
25 TABLET ORAL DAILY
Status: DISCONTINUED | OUTPATIENT
Start: 2025-06-16 | End: 2025-06-19 | Stop reason: HOSPADM

## 2025-06-16 RX ORDER — SODIUM CHLORIDE, SODIUM LACTATE, POTASSIUM CHLORIDE, CALCIUM CHLORIDE 600; 310; 30; 20 MG/100ML; MG/100ML; MG/100ML; MG/100ML
9 INJECTION, SOLUTION INTRAVENOUS CONTINUOUS
Status: ACTIVE | OUTPATIENT
Start: 2025-06-17 | End: 2025-06-17

## 2025-06-16 RX ORDER — SODIUM CHLORIDE, SODIUM LACTATE, POTASSIUM CHLORIDE, CALCIUM CHLORIDE 600; 310; 30; 20 MG/100ML; MG/100ML; MG/100ML; MG/100ML
9 INJECTION, SOLUTION INTRAVENOUS CONTINUOUS
Status: ACTIVE | OUTPATIENT
Start: 2025-06-16 | End: 2025-06-17

## 2025-06-16 RX ORDER — ALLOPURINOL 100 MG/1
100 TABLET ORAL DAILY
Status: DISCONTINUED | OUTPATIENT
Start: 2025-06-16 | End: 2025-06-19 | Stop reason: HOSPADM

## 2025-06-16 RX ORDER — ACETAMINOPHEN 325 MG/1
650 TABLET ORAL EVERY 4 HOURS PRN
Status: DISCONTINUED | OUTPATIENT
Start: 2025-06-16 | End: 2025-06-19 | Stop reason: HOSPADM

## 2025-06-16 RX ORDER — FAMOTIDINE 10 MG/ML
20 INJECTION, SOLUTION INTRAVENOUS EVERY 12 HOURS SCHEDULED
Status: DISCONTINUED | OUTPATIENT
Start: 2025-06-16 | End: 2025-06-19 | Stop reason: HOSPADM

## 2025-06-16 RX ORDER — AMOXICILLIN 250 MG
2 CAPSULE ORAL 2 TIMES DAILY
Status: DISCONTINUED | OUTPATIENT
Start: 2025-06-16 | End: 2025-06-19 | Stop reason: HOSPADM

## 2025-06-16 RX ORDER — BUPIVACAINE HYDROCHLORIDE AND EPINEPHRINE 2.5; 5 MG/ML; UG/ML
INJECTION, SOLUTION EPIDURAL; INFILTRATION; INTRACAUDAL; PERINEURAL AS NEEDED
Status: DISCONTINUED | OUTPATIENT
Start: 2025-06-16 | End: 2025-06-16 | Stop reason: HOSPADM

## 2025-06-16 RX ORDER — PROMETHAZINE HYDROCHLORIDE 25 MG/1
25 TABLET ORAL ONCE AS NEEDED
Status: DISCONTINUED | OUTPATIENT
Start: 2025-06-16 | End: 2025-06-16

## 2025-06-16 RX ORDER — FAMOTIDINE 20 MG/1
20 TABLET, FILM COATED ORAL EVERY 12 HOURS SCHEDULED
Status: DISCONTINUED | OUTPATIENT
Start: 2025-06-16 | End: 2025-06-19 | Stop reason: HOSPADM

## 2025-06-16 RX ORDER — HYDROCODONE BITARTRATE AND ACETAMINOPHEN 5; 325 MG/1; MG/1
1 TABLET ORAL EVERY 4 HOURS PRN
Refills: 0 | Status: DISCONTINUED | OUTPATIENT
Start: 2025-06-16 | End: 2025-06-19 | Stop reason: HOSPADM

## 2025-06-16 RX ORDER — BISACODYL 10 MG
10 SUPPOSITORY, RECTAL RECTAL DAILY PRN
Status: DISCONTINUED | OUTPATIENT
Start: 2025-06-16 | End: 2025-06-19 | Stop reason: HOSPADM

## 2025-06-16 RX ORDER — IBUPROFEN 800 MG/1
800 TABLET, FILM COATED ORAL ONCE
Status: COMPLETED | OUTPATIENT
Start: 2025-06-16 | End: 2025-06-16

## 2025-06-16 RX ORDER — NALOXONE HCL 0.4 MG/ML
0.4 VIAL (ML) INJECTION
Status: DISCONTINUED | OUTPATIENT
Start: 2025-06-16 | End: 2025-06-19 | Stop reason: HOSPADM

## 2025-06-16 RX ORDER — ROCURONIUM BROMIDE 10 MG/ML
INJECTION, SOLUTION INTRAVENOUS AS NEEDED
Status: DISCONTINUED | OUTPATIENT
Start: 2025-06-16 | End: 2025-06-16 | Stop reason: SURG

## 2025-06-16 RX ORDER — FAMOTIDINE 20 MG/1
20 TABLET, FILM COATED ORAL ONCE
Status: COMPLETED | OUTPATIENT
Start: 2025-06-16 | End: 2025-06-16

## 2025-06-16 RX ORDER — HYDRALAZINE HYDROCHLORIDE 20 MG/ML
5 INJECTION INTRAMUSCULAR; INTRAVENOUS
Status: DISCONTINUED | OUTPATIENT
Start: 2025-06-16 | End: 2025-06-16

## 2025-06-16 RX ORDER — FAMOTIDINE 20 MG/1
20 TABLET, FILM COATED ORAL
Status: DISCONTINUED | OUTPATIENT
Start: 2025-06-16 | End: 2025-06-16 | Stop reason: HOSPADM

## 2025-06-16 RX ORDER — DEXAMETHASONE SODIUM PHOSPHATE 4 MG/ML
INJECTION, SOLUTION INTRA-ARTICULAR; INTRALESIONAL; INTRAMUSCULAR; INTRAVENOUS; SOFT TISSUE AS NEEDED
Status: DISCONTINUED | OUTPATIENT
Start: 2025-06-16 | End: 2025-06-16 | Stop reason: SURG

## 2025-06-16 RX ORDER — AMITRIPTYLINE HYDROCHLORIDE 10 MG/1
20 TABLET ORAL NIGHTLY
Status: DISCONTINUED | OUTPATIENT
Start: 2025-06-16 | End: 2025-06-19 | Stop reason: HOSPADM

## 2025-06-16 RX ORDER — SODIUM CHLORIDE 0.9 % (FLUSH) 0.9 %
10 SYRINGE (ML) INJECTION AS NEEDED
Status: DISCONTINUED | OUTPATIENT
Start: 2025-06-16 | End: 2025-06-16 | Stop reason: HOSPADM

## 2025-06-16 RX ORDER — ATORVASTATIN CALCIUM 20 MG/1
20 TABLET, FILM COATED ORAL NIGHTLY
Status: DISCONTINUED | OUTPATIENT
Start: 2025-06-16 | End: 2025-06-19 | Stop reason: HOSPADM

## 2025-06-16 RX ORDER — SODIUM CHLORIDE 9 MG/ML
40 INJECTION, SOLUTION INTRAVENOUS AS NEEDED
Status: DISCONTINUED | OUTPATIENT
Start: 2025-06-16 | End: 2025-06-19 | Stop reason: HOSPADM

## 2025-06-16 RX ORDER — ACETAMINOPHEN 160 MG/5ML
650 SOLUTION ORAL EVERY 4 HOURS PRN
Status: DISCONTINUED | OUTPATIENT
Start: 2025-06-16 | End: 2025-06-19 | Stop reason: HOSPADM

## 2025-06-16 RX ORDER — FENTANYL CITRATE 50 UG/ML
50 INJECTION, SOLUTION INTRAMUSCULAR; INTRAVENOUS
Status: DISCONTINUED | OUTPATIENT
Start: 2025-06-16 | End: 2025-06-16

## 2025-06-16 RX ORDER — VANCOMYCIN HYDROCHLORIDE 1 G/20ML
INJECTION, POWDER, LYOPHILIZED, FOR SOLUTION INTRAVENOUS AS NEEDED
Status: DISCONTINUED | OUTPATIENT
Start: 2025-06-16 | End: 2025-06-16 | Stop reason: HOSPADM

## 2025-06-16 RX ORDER — DROPERIDOL 2.5 MG/ML
0.62 INJECTION, SOLUTION INTRAMUSCULAR; INTRAVENOUS
Status: DISCONTINUED | OUTPATIENT
Start: 2025-06-16 | End: 2025-06-16

## 2025-06-16 RX ORDER — HYDROCODONE BITARTRATE AND ACETAMINOPHEN 7.5; 325 MG/1; MG/1
1 TABLET ORAL EVERY 4 HOURS PRN
Status: DISCONTINUED | OUTPATIENT
Start: 2025-06-16 | End: 2025-06-16

## 2025-06-16 RX ORDER — BUPIVACAINE HCL/0.9 % NACL/PF 0.125 %
PLASTIC BAG, INJECTION (ML) EPIDURAL AS NEEDED
Status: DISCONTINUED | OUTPATIENT
Start: 2025-06-16 | End: 2025-06-16 | Stop reason: SURG

## 2025-06-16 RX ORDER — ONDANSETRON 2 MG/ML
4 INJECTION INTRAMUSCULAR; INTRAVENOUS ONCE AS NEEDED
Status: DISCONTINUED | OUTPATIENT
Start: 2025-06-16 | End: 2025-06-16

## 2025-06-16 RX ORDER — SODIUM CHLORIDE 0.9 % (FLUSH) 0.9 %
10 SYRINGE (ML) INJECTION AS NEEDED
Status: DISCONTINUED | OUTPATIENT
Start: 2025-06-16 | End: 2025-06-19 | Stop reason: HOSPADM

## 2025-06-16 RX ORDER — DROPERIDOL 2.5 MG/ML
0.62 INJECTION, SOLUTION INTRAMUSCULAR; INTRAVENOUS ONCE AS NEEDED
Status: DISCONTINUED | OUTPATIENT
Start: 2025-06-16 | End: 2025-06-16

## 2025-06-16 RX ADMIN — FENTANYL CITRATE 50 MCG: 50 INJECTION, SOLUTION INTRAMUSCULAR; INTRAVENOUS at 07:16

## 2025-06-16 RX ADMIN — ROCURONIUM BROMIDE 50 MG: 10 INJECTION INTRAVENOUS at 09:02

## 2025-06-16 RX ADMIN — DEXMEDETOMIDINE HYDROCHLORIDE 16 MCG: 100 INJECTION, SOLUTION INTRAVENOUS at 10:09

## 2025-06-16 RX ADMIN — FAMOTIDINE 20 MG: 20 TABLET, FILM COATED ORAL at 20:27

## 2025-06-16 RX ADMIN — SODIUM CHLORIDE, POTASSIUM CHLORIDE, SODIUM LACTATE AND CALCIUM CHLORIDE 9 ML/HR: 600; 310; 30; 20 INJECTION, SOLUTION INTRAVENOUS at 06:12

## 2025-06-16 RX ADMIN — DEXMEDETOMIDINE HYDROCHLORIDE 16 MCG: 100 INJECTION, SOLUTION INTRAVENOUS at 10:55

## 2025-06-16 RX ADMIN — SODIUM CHLORIDE, POTASSIUM CHLORIDE, SODIUM LACTATE AND CALCIUM CHLORIDE: 600; 310; 30; 20 INJECTION, SOLUTION INTRAVENOUS at 10:13

## 2025-06-16 RX ADMIN — DEXMEDETOMIDINE HYDROCHLORIDE 16 MCG: 100 INJECTION, SOLUTION INTRAVENOUS at 11:00

## 2025-06-16 RX ADMIN — SUGAMMADEX 300 MG: 100 INJECTION, SOLUTION INTRAVENOUS at 10:56

## 2025-06-16 RX ADMIN — ACETAMINOPHEN 1000 MG: 500 TABLET ORAL at 06:12

## 2025-06-16 RX ADMIN — ESMOLOL HYDROCHLORIDE 100 MG: 100 INJECTION, SOLUTION INTRAVENOUS at 06:55

## 2025-06-16 RX ADMIN — AMITRIPTYLINE HYDROCHLORIDE 20 MG: 10 TABLET, FILM COATED ORAL at 20:27

## 2025-06-16 RX ADMIN — FAMOTIDINE 20 MG: 20 TABLET, FILM COATED ORAL at 06:13

## 2025-06-16 RX ADMIN — OXYCODONE HYDROCHLORIDE AND ACETAMINOPHEN 1 TABLET: 7.5; 325 TABLET ORAL at 17:54

## 2025-06-16 RX ADMIN — OXYCODONE HYDROCHLORIDE 10 MG: 10 TABLET, FILM COATED, EXTENDED RELEASE ORAL at 06:12

## 2025-06-16 RX ADMIN — HYDROCODONE BITARTRATE AND ACETAMINOPHEN 1 TABLET: 5; 325 TABLET ORAL at 15:48

## 2025-06-16 RX ADMIN — VECURONIUM BROMIDE 20 MG: 1 INJECTION, POWDER, LYOPHILIZED, FOR SOLUTION INTRAVENOUS at 06:55

## 2025-06-16 RX ADMIN — PROPOFOL 80 MG: 10 INJECTION, EMULSION INTRAVENOUS at 09:01

## 2025-06-16 RX ADMIN — DONEPEZIL HYDROCHLORIDE 10 MG: 10 TABLET ORAL at 20:27

## 2025-06-16 RX ADMIN — PROPOFOL 250 MG: 10 INJECTION, EMULSION INTRAVENOUS at 06:55

## 2025-06-16 RX ADMIN — SODIUM CHLORIDE 3000 MG: 900 INJECTION INTRAVENOUS at 06:59

## 2025-06-16 RX ADMIN — LIDOCAINE HYDROCHLORIDE 50 MG: 10 INJECTION, SOLUTION EPIDURAL; INFILTRATION; INTRACAUDAL; PERINEURAL at 06:55

## 2025-06-16 RX ADMIN — PHENYLEPHRINE HYDROCHLORIDE 0.5 MCG/KG/MIN: 10 INJECTION INTRAVENOUS at 07:26

## 2025-06-16 RX ADMIN — ROCURONIUM BROMIDE 50 MG: 10 INJECTION INTRAVENOUS at 09:18

## 2025-06-16 RX ADMIN — SODIUM CHLORIDE, POTASSIUM CHLORIDE, SODIUM LACTATE AND CALCIUM CHLORIDE 90 ML/HR: 600; 310; 30; 20 INJECTION, SOLUTION INTRAVENOUS at 15:20

## 2025-06-16 RX ADMIN — LISINOPRIL 20 MG: 20 TABLET ORAL at 15:23

## 2025-06-16 RX ADMIN — IBUPROFEN 800 MG: 800 TABLET, FILM COATED ORAL at 06:13

## 2025-06-16 RX ADMIN — DOCUSATE SODIUM 50 MG AND SENNOSIDES 8.6 MG 2 TABLET: 8.6; 5 TABLET, FILM COATED ORAL at 20:28

## 2025-06-16 RX ADMIN — HYDROMORPHONE HYDROCHLORIDE 0.5 MG: 1 INJECTION, SOLUTION INTRAMUSCULAR; INTRAVENOUS; SUBCUTANEOUS at 14:01

## 2025-06-16 RX ADMIN — Medication 100 MCG: at 07:13

## 2025-06-16 RX ADMIN — SODIUM CHLORIDE 3000 MG: 900 INJECTION INTRAVENOUS at 22:27

## 2025-06-16 RX ADMIN — Medication 10 ML: at 22:29

## 2025-06-16 RX ADMIN — ATORVASTATIN CALCIUM 20 MG: 20 TABLET, FILM COATED ORAL at 20:27

## 2025-06-16 RX ADMIN — FENTANYL CITRATE 50 MCG: 50 INJECTION, SOLUTION INTRAMUSCULAR; INTRAVENOUS at 08:58

## 2025-06-16 RX ADMIN — ONDANSETRON 4 MG: 2 INJECTION INTRAMUSCULAR; INTRAVENOUS at 10:42

## 2025-06-16 RX ADMIN — DEXMEDETOMIDINE HYDROCHLORIDE 16 MCG: 100 INJECTION, SOLUTION INTRAVENOUS at 09:13

## 2025-06-16 RX ADMIN — LIDOCAINE HYDROCHLORIDE 0.5 ML: 10 INJECTION, SOLUTION EPIDURAL; INFILTRATION; INTRACAUDAL; PERINEURAL at 06:12

## 2025-06-16 RX ADMIN — SODIUM CHLORIDE 3000 MG: 900 INJECTION INTRAVENOUS at 15:20

## 2025-06-16 RX ADMIN — DEXAMETHASONE SODIUM PHOSPHATE 4 MG: 4 INJECTION, SOLUTION INTRA-ARTICULAR; INTRALESIONAL; INTRAMUSCULAR; INTRAVENOUS; SOFT TISSUE at 07:17

## 2025-06-16 RX ADMIN — FENTANYL CITRATE 100 MCG: 50 INJECTION, SOLUTION INTRAMUSCULAR; INTRAVENOUS at 10:59

## 2025-06-16 NOTE — PLAN OF CARE
Goal Outcome Evaluation:  Plan of Care Reviewed With: patient           Outcome Evaluation: Pt is alert and oriented x4. VSS on room air. Ambulated in room with min assist x1 and walker. Ice applied to incision, PRN PO pain meds provided and effective per pt. Voiding spontaneously.

## 2025-06-16 NOTE — H&P
"Pre-Op H&P  Bonilla Sterling Jr.  [id number]  1960    Chief complaint: \"Chronic low back pain\"    HPI:    Patient is a 65 y.o.male who presents with chronic low back pain has been going on for 3 years.  It is worse as time goes on.  He now has a terrible back pain after about 15 minutes of standing.  He was found to have a spondylolisthesis and spinal stenosis in his lumbar spine.  After failing conservative therapy the patient is now admitted for lumbar decompression and fusion.    Review of Systems:  General ROS: negative for chills, fever or skin lesions;  No changes since last office visit.  Neg for recent sick exposure  Cardiovascular ROS: no chest pain or dyspnea on exertion  Respiratory ROS: no cough, shortness of breath, or wheezing    Allergies: No Known Allergies    Home Meds:    No current facility-administered medications on file prior to encounter.     Current Outpatient Medications on File Prior to Encounter   Medication Sig Dispense Refill    allopurinol (ZYLOPRIM) 100 MG tablet Take 1 tablet by mouth Daily.      amitriptyline (ELAVIL) 10 MG tablet Take 2 tablets by mouth Every Night. 60 tablet 2    atorvastatin (LIPITOR) 20 MG tablet Take 1 tablet by mouth Every Night.      DULoxetine (CYMBALTA) 60 MG capsule Take 1 capsule by mouth Every Morning. 30 capsule 5    hydrochlorothiazide (HYDRODIURIL) 25 MG tablet Take 1 tablet by mouth Daily.      lisinopril (PRINIVIL,ZESTRIL) 20 MG tablet Take 1 tablet by mouth 2 (two) times a day.      Multiple Vitamins-Minerals (CENTRUM SILVER PO) Take 1 tablet by mouth Daily.      vitamin C (ASCORBIC ACID) 500 MG tablet Take 1 tablet by mouth Daily. NOT CURRENTLY TAKING         PMH:   Past Medical History:   Diagnosis Date    Acid reflux     Anxiety     Arthritis 1990    OSTEO    Cervical disc disorder 1980    Bulging disc    Chronic pain disorder     Back     CTS (carpal tunnel syndrome) 2023    Elevated cholesterol     Gout     HL (hearing loss) 2022    " Hypertension     Low back pain     Lumbosacral disc disease     Bulging disc    Memory loss     Have an appointment to confirm    Peripheral neuropathy     Sinus headache     Sleep apnea     DOES NOT USE CPAP    Thoracic disc disorder     Bulging disc    Vision loss     Wears glasses     Wears partial dentures     Full upper plate - advised no adhesives DOS     PSH:    Past Surgical History:   Procedure Laterality Date    CARPAL TUNNEL RELEASE Bilateral     COLONOSCOPY  2009    COLONOSCOPY N/A 2020    Procedure: COLONOSCOPY;  Surgeon: Chantale Naik MD;  Location: [facility] ENDOSCOPY;  Service: Gastroenterology;  Laterality: N/A;    CYST REMOVAL      FOREHEAD, SEBACEOUS CYST    MOUTH SURGERY      Oral extractions    ORIF WRIST FRACTURE Right      Patient denies allergy to contrast dye or latex  Immunization History:  Influenza: No  Pneumococcal: No  Tetanus: Up-to-date    Social History:   Tobacco:   Social History     Tobacco Use   Smoking Status Former    Current packs/day: 0.00    Average packs/day: 1 pack/day for 20.0 years (20.0 ttl pk-yrs)    Types: Cigarettes    Start date: 1989    Quit date:     Years since quittin.4    Passive exposure: Past   Smokeless Tobacco Never      Alcohol:     Social History     Substance and Sexual Activity   Alcohol Use Not Currently    Comment: quit 10 years ago        Vitals:           /83 (BP Location: Right arm, Patient Position: Lying)   Pulse 95   Temp 97.7 °F (36.5 °C) (Temporal)   Resp 18   SpO2 97%     Physical Exam:  General Appearance:    Alert, cooperative, no distress, appears stated age   Head:    Normocephalic, without obvious abnormality, atraumatic   Lungs:   Clear to auscultation bilaterally to the bases    Heart: S1-S2 without rubs murmurs or gallops    Abdomen:  Soft, nontender, bowel sounds present throughout.   Breast Exam:    deferred   Genitalia:    deferred   Extremities:   Extremities normal, atraumatic, no  cyanosis or edema   Skin:   Skin color, texture, turgor normal, no rashes or lesions   Neurologic:   Grossly intact   Results Review  LABS:  Lab Results   Component Value Date    WBC 9.84 06/09/2025    HGB 14.0 06/09/2025    HCT 45.0 06/09/2025    MCV 85.4 06/09/2025     06/09/2025    NEUTROABS 7.13 (H) 04/28/2025    GLUCOSE 119 (H) 04/28/2025    BUN 21 04/28/2025    CREATININE 1.33 (H) 04/28/2025    EGFRIFNONA >60 08/04/2021    EGFRIFAFRI >60 08/04/2021     04/28/2025    K 4.7 06/09/2025    CL 98 04/28/2025    CO2 27.2 04/28/2025    CALCIUM 9.3 04/28/2025    ALBUMIN 4.4 04/28/2025    AST 22 04/28/2025    ALT 33 04/28/2025    BILITOT 0.3 04/28/2025       RADIOLOGY:  No radiology results for the last 3 days     I reviewed the patient's new clinical results.    Cancer Staging (if applicable)  Cancer Patient: __ yes __no __unknown; If yes, clinical stage T:__ N:__M:__, stage group or __N/A    Impression: Chronic low back pain  Neurogenic claudication  Spinal stenosis  Obesity  Hypertension  Elevated creatinine to 1.33  Sleep apnea      Plan: Lumbar fusion decompression with pedicle screws.      PALAK Conley   06/16/25   6:36 AM EDT

## 2025-06-16 NOTE — ANESTHESIA PREPROCEDURE EVALUATION
Anesthesia Evaluation     Patient summary reviewed and Nursing notes reviewed   NPO Solid Status: > 8 hours  NPO Liquid Status: > 8 hours           Airway   Mallampati: I  TM distance: >3 FB  Neck ROM: full  No difficulty expected  Dental    (+) partials    Pulmonary - normal exam   Cardiovascular   Exercise tolerance: good (4-7 METS)    Rhythm: regular  Rate: normal        Neuro/Psych  GI/Hepatic/Renal/Endo      Musculoskeletal     Abdominal    Substance History      OB/GYN          Other                    Anesthesia Plan    ASA 3     general     intravenous induction     Anesthetic plan, risks, benefits, and alternatives have been provided, discussed and informed consent has been obtained with: patient.    CODE STATUS:

## 2025-06-16 NOTE — ANESTHESIA POSTPROCEDURE EVALUATION
Patient: Bonilla Sterling Jr.    Procedure Summary       Date: 06/16/25 Room / Location:  [facility] /  [facility]    Anesthesia Start: 0653 Anesthesia Stop: 1113    Procedure: LUMBAR FUSION DECOMPRESSION WITH PEDICLE SCREWS (Spine Lumbar) Diagnosis:       Lumbar stenosis with neurogenic claudication      (Lumbar stenosis with neurogenic claudication [M48.062])    Surgeons: Yang Whitley MD Provider: Ewelina Chávez MD    Anesthesia Type: general ASA Status: 3            Anesthesia Type: general    Vitals  Vitals Value Taken Time   BP     Temp     Pulse 76 06/16/25 11:14   Resp     SpO2 100 % 06/16/25 11:14   Vitals shown include unfiled device data.        Post Anesthesia Care and Evaluation    Patient location during evaluation: PACU  Patient participation: complete - patient participated  Level of consciousness: responsive to verbal stimuli  Pain score: 0  Pain management: adequate    Airway patency: patent  Anesthetic complications: No anesthetic complications  PONV Status: none  Cardiovascular status: hemodynamically stable and acceptable  Respiratory status: nonlabored ventilation, acceptable, nasal cannula and spontaneous ventilation  Hydration status: acceptable    Comments: VSS  No anesthesia care post op

## 2025-06-16 NOTE — ANESTHESIA PROCEDURE NOTES
Airway  Reason: elective    Date/Time: 6/16/2025 6:57 AM  Airway not difficult    General Information and Staff    Patient location during procedure: OR  CRNA/CAA: Gael Hughes CRNA    Indications and Patient Condition  Indications for airway management: airway protection    Preoxygenated: yes  MILS not maintained throughout    Mask difficulty assessment: 1 - vent by mask    Final Airway Details    Final airway type: endotracheal airway      Successful airway: ETT  Cuffed: yes   Successful intubation technique: direct laryngoscopy  Endotracheal tube insertion site: oral  Blade: Negin  Blade size: 4  ETT size (mm): 8.0  Cormack-Lehane Classification: grade I - full view of glottis  Placement verified by: chest auscultation and capnometry   Measured from: lips  ETT/EBT  to lips (cm): 22  Number of attempts at approach: 1  Assessment: lips, teeth, and gum same as pre-op and atraumatic intubation    Additional Comments  Negative epigastric sounds, Breath sound equal bilaterally with symmetric chest rise and fall

## 2025-06-16 NOTE — OP NOTE
NEUROSURGICAL OPERATIVE NOTE        PREOPERATIVE DIAGNOSIS:    L4-5 spondylolisthesis, stenosis, instability      POSTOPERATIVE DIAGNOSIS:  Same      PROCEDURE:  1.  Arthrodesis interbody type L4-5  2.  L4-5 laminectomy with partial facetectomies and foraminotomies  3.  Bilateral L4-5 discectomy with bilateral Adaptix cage placement  4.  Nonsegmental pedicle screw fixation L4-5 utilizing Solera  5.  Use of MagnetOs and local autograft  6.  Stealth stereotaxy utilized in conjunction with O arm imaging      SURGEON:  Yang Whitley M.D.      ASSISTANT: Steph Alvarado PA-C    PAC assisted with:   Suctioning   Retraction   Tying   Suturing   Closing   Application of dressing   Skilled neurosurgery PA assistance was necessary to perform this procedure.        ANESTHESIA:  General      ESTIMATED BLOOD LOSS: 140 mL      SPECIMEN: None      DRAINS: Hemovac      COMPLICATIONS:  None      Spinal Surgery Levels Completed:1 Level        CLINICAL NOTE:  The patient is a 65-year-old gentleman with progressive back pain with walking and standing intolerance.  Studies demonstrate a spondylolisthesis at L4-5 with high-grade stenosis and in fact a complete block on the upright myelographic images.  His facet complexes are oriented anterior to posterior putting him at risk for instability particularly in light of the substantial bony resection that will be necessary for decompression.  As such, he presents at this time for L4-5 decompression with fusion and stabilization.  The nature of the procedure as well as the potential risks, complications, limitations, and alternatives to the procedure were discussed at length with the patient and the patient has agreed to proceed with surgery.      TECHNICAL NOTE:  The patient was brought to the operating room and while on his cart, general endotracheal anesthesia was achieved. He was then turned prone onto the Tyler table. Special care was ensured to protect pressure points. His low back  was prepared and draped in the usual fashion. A localizing radiograph was obtained with a spinal needle in the lumbosacral midline. Based on this, a several centimeter vertical incision was fashioned. The underlying tissues were divided with cautery to provide exposure to the posterior spinal elements at L4 to L5. Reference frame was affixed to a spinous process. O-arm imaging ensued. These images were downloaded into the [employer]. Using Stealth frameless stereotaxy, each of the pedicle screw holes sites at L4 and L5 bilaterally were marked, drilled, and tapped. Tapping sequentially with 5.5, 6.5, and 7.5 mm diameter taps was pursued. Ultimately, 7.5 mm diameter screws were placed bilaterally at L4 and L5. All screws were 55 mm in length, except the right L5 screw, which was 50 mm in length. A Leksell rongeur was then utilized to remove the spinous processes at L4 and the upper aspect of L5. The lamina was taken down with the drill, bone was harvested in a looping strap. Facet complexes were subtotally resected. Ultimately, good decompression of the L4 and L5 nerve roots was accomplished. The C-arm was brought into use and beginning on the right, the disc was incised at L4-L5 and evacuated piecemeal with an array of rongeurs, curettes, and punches. Serial impactors were impacted into the disc space. Ultimately, an  Adaptix cage measuring 12 x 24 mm was impacted into the disc space using fluoroscopic-guidance. This had been packed with local autograft. Attention was turned to the contralateral side where  the disc spaced was prepared prior to placing the second 12 x 24 mm cage. MagnetOs wafers and autograft were placed in the midline and anteriorly. The second cage was impacted into place. Stelara rods were then placed on each side and set screws were applied. Compression across the disc space was performed before tightening and breaking off the facet screws per routine. The wound was washed out with a saline  solution. Gelfoam was left in the gutters. A Hemovac drain was brought in through a separate stab incision and left in the epidural space. Vancomycin powder was sprinkled in the depths and then more superficially as the wound was closed. The paraspinous muscle and fascia were reapproximated in an interrupted fashion with 0 Vicryl suture. Subcutaneous tissues were closed in multiple layers with 2-0, followed by 3-0 Vicryl sutures. The skin was closed in a running subcuticular fashion with 3-0 Vicryl sutures. Steri-Strips and a sterile dressing were applied. He was rolled onto his cart, extubated, and taken to the recovery room in satisfactory condition.              Yang Whitley M.D.

## 2025-06-17 LAB
HCT VFR BLD AUTO: 41 % (ref 37.5–51)
HGB BLD-MCNC: 12.6 G/DL (ref 13–17.7)

## 2025-06-17 PROCEDURE — 97530 THERAPEUTIC ACTIVITIES: CPT

## 2025-06-17 PROCEDURE — 85014 HEMATOCRIT: CPT | Performed by: NEUROLOGICAL SURGERY

## 2025-06-17 PROCEDURE — 25810000003 LACTATED RINGERS PER 1000 ML: Performed by: NEUROLOGICAL SURGERY

## 2025-06-17 PROCEDURE — 97165 OT EVAL LOW COMPLEX 30 MIN: CPT

## 2025-06-17 PROCEDURE — 99024 POSTOP FOLLOW-UP VISIT: CPT | Performed by: NEUROLOGICAL SURGERY

## 2025-06-17 PROCEDURE — 85018 HEMOGLOBIN: CPT | Performed by: NEUROLOGICAL SURGERY

## 2025-06-17 PROCEDURE — 97161 PT EVAL LOW COMPLEX 20 MIN: CPT

## 2025-06-17 RX ORDER — DULOXETIN HYDROCHLORIDE 60 MG/1
60 CAPSULE, DELAYED RELEASE ORAL EVERY MORNING
Qty: 30 CAPSULE | Refills: 0 | OUTPATIENT
Start: 2025-06-17

## 2025-06-17 RX ADMIN — DONEPEZIL HYDROCHLORIDE 10 MG: 10 TABLET ORAL at 20:06

## 2025-06-17 RX ADMIN — SODIUM CHLORIDE, POTASSIUM CHLORIDE, SODIUM LACTATE AND CALCIUM CHLORIDE 90 ML/HR: 600; 310; 30; 20 INJECTION, SOLUTION INTRAVENOUS at 02:44

## 2025-06-17 RX ADMIN — HYDROCHLOROTHIAZIDE 25 MG: 25 TABLET ORAL at 09:15

## 2025-06-17 RX ADMIN — FAMOTIDINE 20 MG: 20 TABLET, FILM COATED ORAL at 09:16

## 2025-06-17 RX ADMIN — DULOXETINE 60 MG: 60 CAPSULE, DELAYED RELEASE ORAL at 09:16

## 2025-06-17 RX ADMIN — OXYCODONE HYDROCHLORIDE AND ACETAMINOPHEN 1 TABLET: 7.5; 325 TABLET ORAL at 09:15

## 2025-06-17 RX ADMIN — OXYCODONE HYDROCHLORIDE AND ACETAMINOPHEN 1 TABLET: 7.5; 325 TABLET ORAL at 02:43

## 2025-06-17 RX ADMIN — DOCUSATE SODIUM 50 MG AND SENNOSIDES 8.6 MG 2 TABLET: 8.6; 5 TABLET, FILM COATED ORAL at 09:16

## 2025-06-17 RX ADMIN — ATORVASTATIN CALCIUM 20 MG: 20 TABLET, FILM COATED ORAL at 20:07

## 2025-06-17 RX ADMIN — BISACODYL 5 MG: 5 TABLET, COATED ORAL at 14:48

## 2025-06-17 RX ADMIN — Medication 10 ML: at 09:19

## 2025-06-17 RX ADMIN — FAMOTIDINE 20 MG: 20 TABLET, FILM COATED ORAL at 20:07

## 2025-06-17 RX ADMIN — OXYCODONE HYDROCHLORIDE AND ACETAMINOPHEN 1 TABLET: 7.5; 325 TABLET ORAL at 20:06

## 2025-06-17 RX ADMIN — OXYCODONE HYDROCHLORIDE AND ACETAMINOPHEN 1 TABLET: 7.5; 325 TABLET ORAL at 14:48

## 2025-06-17 RX ADMIN — DOCUSATE SODIUM 50 MG AND SENNOSIDES 8.6 MG 2 TABLET: 8.6; 5 TABLET, FILM COATED ORAL at 20:07

## 2025-06-17 RX ADMIN — AMITRIPTYLINE HYDROCHLORIDE 20 MG: 10 TABLET, FILM COATED ORAL at 20:06

## 2025-06-17 RX ADMIN — ALLOPURINOL 100 MG: 100 TABLET ORAL at 09:16

## 2025-06-17 RX ADMIN — Medication 10 ML: at 20:07

## 2025-06-17 RX ADMIN — LISINOPRIL 20 MG: 20 TABLET ORAL at 09:16

## 2025-06-17 NOTE — TELEPHONE ENCOUNTER
Please call Patient to schedule appointment per provider must be seen in order to continue medication refills.

## 2025-06-17 NOTE — PLAN OF CARE
Goal Outcome Evaluation:  Plan of Care Reviewed With: patient           Outcome Evaluation: PT evaluation completed. Pt ambulates both with and without RWx and ultimately does not need AD at this time to safely mobilize. Stair trial completed per home set up with CGA and cues for improved sequencing of task. Post-op spinal precautions reviewed with pt prior to mobilizing and he verbalizes understanding. Pt is cleared for d/c from a mobility standpoint, anticipate pt to d/c home with assist once medically appropriate.    Anticipated Discharge Disposition (PT): home with assist

## 2025-06-17 NOTE — TELEPHONE ENCOUNTER
Last seen months ago. Currently in the hospital for a surgery it appears. Does look like he is still taking medication but he would need to be seen in order to continue medications

## 2025-06-17 NOTE — PLAN OF CARE
Goal Outcome Evaluation:  Plan of Care Reviewed With: patient        Progress: no change (Initial Eval)  Outcome Evaluation: Patient presenting below his functional baseline w/ mobility, transfers and endurance limited by back pain. Pt demonstrating good recall of spinal precautions. OT issued and educated pt on use of AE for increased I w/ ADLs while limited by spinal precautions. Deficits warrant skilled OT services. Recommend home w/ assist when medically appropriate for d/c.

## 2025-06-17 NOTE — CASE MANAGEMENT/SOCIAL WORK
Discharge Planning Assessment  [facility]     Patient Name: Bonilla Sterling Jr.  MRN: [id number]  Today's Date: 6/17/2025    Admit Date: 6/16/2025    Plan: Home   Discharge Needs Assessment       Row Name 06/17/25 1507       Living Environment    People in Home spouse    Name(s) of People in Home Marilyn    Current Living Arrangements home    Potentially Unsafe Housing Conditions unable to assess    In the past 12 months has the electric, gas, oil, or water company threatened to shut off services in your home? No    Primary Care Provided by self    Provides Primary Care For no one    Family Caregiver if Needed spouse    Family Caregiver Names Marilyn    Quality of Family Relationships unable to assess    Able to Return to Prior Arrangements yes       Resource/Environmental Concerns    Resource/Environmental Concerns none    Transportation Concerns none       Transportation Needs    In the past 12 months, has lack of transportation kept you from medical appointments or from getting medications? no    In the past 12 months, has lack of transportation kept you from meetings, work, or from getting things needed for daily living? No       Food Insecurity    Within the past 12 months, you worried that your food would run out before you got the money to buy more. Never true    Within the past 12 months, the food you bought just didn't last and you didn't have money to get more. Never true       Transition Planning    Patient/Family Anticipates Transition to home with family    Transportation Anticipated family or friend will provide       Discharge Needs Assessment    Equipment Currently Used at Home none    Concerns to be Addressed no discharge needs identified;denies needs/concerns at this time    Do you want help finding or keeping work or a job? I do not need or want help    Do you want help with school or training? For example, starting or completing job training or getting a high school diploma, GED or equivalent  No                   Discharge Plan       Row Name 06/17/25 1508       Plan    Plan Home    Patient/Family in Agreement with Plan yes    Plan Comments Spoke with patient at bedside to initiate discharge planning.  Confirmed patient and spouse reside in [facility]; PCP is Christin Yeh; insurance is [employer].  Patient states he is independent with ADLs and mobility; no DME or home health currently utilized.  Discharge plan is home and patient has transportation.  Patient denies needs.  Case Management following.    Final Discharge Disposition Code 01 - home or self-care                       Demographic Summary       Row Name 06/17/25 1501       General Information    Referral Source admission list    Reason for Consult discharge planning    Preferred Language English       Contact Information    Permission Granted to Share Info With                    Functional Status       Row Name 06/17/25 1505       Functional Status    Usual Activity Tolerance good       Physical Activity    On average, how many days per week do you engage in moderate to strenuous exercise (like a brisk walk)? Pt Declined    On average, how many minutes do you engage in exercise at this level? Pt Declined       Functional Status, IADL    Medications independent    Meal Preparation independent    Housekeeping independent    Laundry independent    Shopping independent    If for any reason you need help with day-to-day activities such as bathing, preparing meals, shopping, managing finances, etc., do you get the help you need? I don't need any help                   Psychosocial    No documentation.                  Abuse/Neglect    No documentation.                  Legal    No documentation.                  Substance Abuse    No documentation.                  Patient Forms    No documentation.                     Ellen Carvajal RN

## 2025-06-17 NOTE — PROGRESS NOTES
NEUROSURGERY PROGRESS NOTE     LOS: 1 day   Patient Care Team:  Christin Yeh APRN as PCP - General (Family Medicine)  Christin Yeh APRN as Nurse Practitioner (Family Medicine)    Chief Complaint: Low back pain with walking and standing intolerance.    POD#: 1 Day Post-Op  Procedures:  L4-5 PLIF.    Interval History:   Patient has ambulated in the mcdaniel twice so far.  Patient Complaints: Incisional pain.  Patient Denies: Weakness or numbness.  He has no voiding difficulty.    Vital Signs: Blood pressure 119/69, pulse 70, temperature 97.8 °F (36.6 °C), temperature source Oral, resp. rate 16, SpO2 96%.  Intake/Output:   Intake/Output Summary (Last 24 hours) at 6/17/2025 0622  Last data filed at 6/17/2025 0504  Gross per 24 hour   Intake 1100 ml   Output 1310 ml   Net -210 ml     Drain output: 135/235 mL.    Physical Exam:  The patient awakens easily and is a good spirits.  Motor function is intact in his lower extremities.  Mild heme staining is noted on his dressing.     Data Review:    H&H is pending.    Assessment/Plan:  1.  Lumbar spondylolisthesis, stenosis, instability status post decompression with fusion and stabilization.  2.  Hypertension.  3.  Obesity.  4.  Disposition: Mobilize patient.  I anticipate that he will be discharged home in a day or 2.      Yang Whitley MD  06/17/25  06:22 EDT

## 2025-06-17 NOTE — THERAPY EVALUATION
Patient Name: Bonilla Sterling Jr.  : 1960    MRN: [id number]                              Today's Date: 2025       Admit Date: 2025    Visit Dx:     ICD-10-CM ICD-9-CM   1. Lumbar stenosis with neurogenic claudication  M48.062 724.03     Patient Active Problem List   Diagnosis    Hypertension    Gastroesophageal reflux disease without esophagitis    Bulging of lumbar intervertebral disc    Lumbar stenosis with neurogenic claudication     Past Medical History:   Diagnosis Date    Acid reflux     Anxiety     Arthritis     OSTEO    Cervical disc disorder     Bulging disc    Chronic pain disorder     Back     CTS (carpal tunnel syndrome)     Elevated cholesterol     Gout     HL (hearing loss)     Hypertension     Low back pain     Lumbosacral disc disease     Bulging disc    Memory loss     Have an appointment to confirm    Peripheral neuropathy     Sinus headache     Sleep apnea     DOES NOT USE CPAP    Thoracic disc disorder     Bulging disc    Vision loss     Wears glasses     Wears partial dentures     Full upper plate - advised no adhesives DOS     Past Surgical History:   Procedure Laterality Date    CARPAL TUNNEL RELEASE Bilateral     COLONOSCOPY  2009    COLONOSCOPY N/A 2020    Procedure: COLONOSCOPY;  Surgeon: Chantale Naik MD;  Location: [facility] ENDOSCOPY;  Service: Gastroenterology;  Laterality: N/A;    CYST REMOVAL      FOREHEAD, SEBACEOUS CYST    LUMBAR LAMINECTOMY WITH FUSION N/A 2025    Procedure: LUMBAR FUSION DECOMPRESSION WITH PEDICLE SCREWS L4-5;  Surgeon: Yang Whitley MD;  Location: [facility] OR;  Service: Neurosurgery;  Laterality: N/A;    MOUTH SURGERY      Oral extractions    ORIF WRIST FRACTURE Right       General Information       Row Name 25 1040          Physical Therapy Time and Intention    Document Type evaluation  -ND     Mode of Treatment physical therapy  -ND       Row Name 25 1040          General Information     Patient Profile Reviewed yes  -ND     Prior Level of Function independent:;all household mobility;community mobility;gait;transfer;bed mobility;using stairs  Pt denies using AD at baseline. Denies falls.  -ND     Existing Precautions/Restrictions fall;spinal;other (see comments)  Hemovac  -ND     Barriers to Rehab none identified  -ND       Row Name 06/17/25 1040          Living Environment    Current Living Arrangements home  -ND     People in Home spouse  -ND       Row Name 06/17/25 1040          Home Main Entrance    Number of Stairs, Main Entrance four  -ND     Stair Railings, Main Entrance railing on right side (ascending)  -ND       Row Name 06/17/25 1040          Stairs Within Home, Primary    Number of Stairs, Within Home, Primary none  -ND       Row Name 06/17/25 1040          Cognition    Orientation Status (Cognition) oriented x 4  -ND       Row Name 06/17/25 1040          Safety Issues/Impairments Affecting Functional Mobility    Safety Issues Affecting Function (Mobility) safety precaution awareness;sequencing abilities  -ND     Impairments Affecting Function (Mobility) balance;endurance/activity tolerance;strength;pain  -ND               User Key  (r) = Recorded By, (t) = Taken By, (c) = Cosigned By      Initials Name Provider Type    ND Susan Lion, PT Physical Therapist                   Mobility       Row Name 06/17/25 1041          Bed Mobility    Bed Mobility supine-sit  -ND     Supine-Sit [place] (Bed Mobility) contact guard;verbal cues;nonverbal cues (demo/gesture)  -ND     Assistive Device (Bed Mobility) bed rails  -ND     Comment, (Bed Mobility) Log roll technique. Cues for improved sequencing.  -ND       Row Name 06/17/25 1041          Sit-Stand Transfer    Sit-Stand [place] (Transfers) contact guard  -ND     Assistive Device (Sit-Stand Transfers) walker, front-wheeled  -ND     Comment, (Sit-Stand Transfer) from EOB.  -ND       Row Name 06/17/25 1041          Gait/Stairs  (Locomotion)    [place] Level (Gait) contact guard;verbal cues;nonverbal cues (demo/gesture)  -ND     Assistive Device (Gait) walker, front-wheeled  -ND     Patient was able to Ambulate yes  -ND     Distance in Feet (Gait) 600  -ND     Deviations/Abnormal Patterns (Gait) bilateral deviations;choco decreased;antalgic;gait speed decreased  -ND     [place] Level (Stairs) contact guard;verbal cues;nonverbal cues (demo/gesture)  -ND     Handrail Location (Stairs) right side (ascending)  -ND     Number of Steps (Stairs) 5  -ND     Ascending Technique (Stairs) step-over-step  -ND     Descending Technique (Stairs) step-over-step  -ND     Comment, (Gait/Stairs) Pt ambulates with step-through gait pattern and RWx for first lap and then ambulates a second lap around the unit without AD and maintains excellent mechanics. Pt denies increased pain with mobility. Stair trial completed with R asc HR per home set up with CGA for safety.  -ND               User Key  (r) = Recorded By, (t) = Taken By, (c) = Cosigned By      Initials Name Provider Type    ND Susan Lion, ARINA Physical Therapist                   Obj/Interventions       Row Name 06/17/25 1044          Range of Motion Comprehensive    General Range of Motion bilateral lower extremity ROM WFL  -ND       Row Name 06/17/25 1044          Strength Comprehensive (MMT)    General Manual Muscle Testing (MMT) Assessment lower extremity strength deficits identified  -ND     Comment, General Manual Muscle Testing (MMT) Assessment BLE grossly 4/5.  -ND       Row Name 06/17/25 1044          Motor Skills    Therapeutic Exercise hip;knee;ankle  -ND       Row Name 06/17/25 1044          Hip (Therapeutic Exercise)    Hip (Therapeutic Exercise) isometric exercises  -ND     Hip Isometrics (Therapeutic Exercise) bilateral;gluteal sets;other (see comments);10 repetitions  abdominal setting x10  -ND       Row Name 06/17/25 1044          Knee (Therapeutic Exercise)    Knee  (Therapeutic Exercise) isometric exercises  -ND     Knee Isometrics (Therapeutic Exercise) bilateral;quad sets;10 repetitions  -ND       Row Name 06/17/25 1044          Ankle (Therapeutic Exercise)    Ankle (Therapeutic Exercise) AROM (active range of motion)  -ND     Ankle AROM (Therapeutic Exercise) bilateral;dorsiflexion;plantarflexion;10 repetitions  -ND       Row Name 06/17/25 1044          Balance    Balance Assessment sitting static balance;sitting dynamic balance;standing static balance;standing dynamic balance  -ND     Static Sitting Balance standby assist  -ND     Dynamic Sitting Balance standby assist  -ND     Position, Sitting Balance unsupported;sitting edge of bed  -ND     Static Standing Balance contact guard  -ND     Dynamic Standing Balance contact guard  -ND     Position/Device Used, Standing Balance supported;walker, front-wheeled  -ND     Balance Interventions sitting;standing;sit to stand;supported;static;dynamic  -ND     Comment, Balance No knee buckling or LOB noted.  -ND       Row Name 06/17/25 1044          Sensory Assessment (Somatosensory)    Sensory Assessment (Somatosensory) LE sensation intact  -ND               User Key  (r) = Recorded By, (t) = Taken By, (c) = Cosigned By      Initials Name Provider Type    ND Susan Lion, PT Physical Therapist                   Goals/Plan       Row Name 06/17/25 1049          Bed Mobility Goal 1 (PT)    Activity/Assistive Device (Bed Mobility Goal 1, PT) sit to supine/supine to sit  -ND     [place] Level/Cues Needed (Bed Mobility Goal 1, PT) modified independence  -ND     Time Frame (Bed Mobility Goal 1, PT) short term goal (STG);1 day  -ND     Strategies/Barriers (Bed Mobility Goal 1, PT) log roll technique  -ND       Row Name 06/17/25 1049          Transfer Goal 1 (PT)    Activity/Assistive Device (Transfer Goal 1, PT) sit-to-stand/stand-to-sit;bed-to-chair/chair-to-bed  -ND     [place] Level/Cues Needed (Transfer Goal 1, PT)  modified independence  -ND     Time Frame (Transfer Goal 1, PT) long term goal (LTG);3 days  -ND       Row Name 06/17/25 1049          Gait Training Goal 1 (PT)    Activity/Assistive Device (Gait Training Goal 1, PT) gait (walking locomotion);increase endurance/gait distance;decrease asymmetrical patterns  -ND     [place] Level (Gait Training Goal 1, PT) modified independence  -ND     Distance (Gait Training Goal 1, PT) 500  -ND     Time Frame (Gait Training Goal 1, PT) long term goal (LTG);3 days  -ND       Row Name 06/17/25 1049          Stairs Goal 1 (PT)    Activity/Assistive Device (Stairs Goal 1, PT) ascending stairs;descending stairs;using handrail, right  -ND     [place] Level/Cues Needed (Stairs Goal 1, PT) modified independence  -ND     Number of Stairs (Stairs Goal 1, PT) 4  -ND     Time Frame (Stairs Goal 1, PT) long term goal (LTG);3 days  -ND       Row Name 06/17/25 1049          Therapy Assessment/Plan (PT)    Planned Therapy Interventions (PT) balance training;bed mobility training;home exercise program;gait training;patient/family education;transfer training;postural re-education;ROM (range of motion);stair training;strengthening  -ND               User Key  (r) = Recorded By, (t) = Taken By, (c) = Cosigned By      Initials Name Provider Type    ND Susan Lion, PT Physical Therapist                   Clinical Impression       Row Name 06/17/25 1047          Pain    Pretreatment Pain Rating 6/10  -ND     Posttreatment Pain Rating 5/10  -ND     Pain Location back  -ND     Pain Side/Orientation generalized  -ND     Pain Management Interventions positioning techniques utilized;premedicated for activity;exercise or physical activity utilized;activity modification encouraged  -ND     Response to Pain Interventions activity participation with tolerable pain  -ND       Row Name 06/17/25 1047          Plan of Care Review    Plan of Care Reviewed With patient  -ND     Outcome Evaluation PT  evaluation completed. Pt ambulates both with and without RWx and ultimately does not need AD at this time to safely mobilize. Stair trial completed per home set up with CGA and cues for improved sequencing of task. Post-op spinal precautions reviewed with pt prior to mobilizing and he verbalizes understanding. Pt is cleared for d/c from a mobility standpoint, anticipate pt to d/c home with assist once medically appropriate.  -ND       Row Name 06/17/25 1047          Therapy Assessment/Plan (PT)    Patient/Family Therapy Goals Statement (PT) Mobilize pain free.  -ND     Rehab Potential (PT) good  -ND     Criteria for Skilled Interventions Met (PT) yes;meets criteria;skilled treatment is necessary  -ND     Therapy Frequency (PT) daily  -ND     Predicted Duration of Therapy Intervention (PT) 2 days  -ND       Row Name 06/17/25 1047          Vital Signs    Pre Systolic BP Rehab 119  -ND     Pre Treatment Diastolic BP 69  -ND     Post Systolic BP Rehab 133  -ND     Post Treatment Diastolic BP 81  -ND     Pretreatment Heart Rate (beats/min) 88  -ND     Posttreatment Heart Rate (beats/min) 96  -ND     Pre SpO2 (%) 97  -ND     O2 Delivery Pre Treatment room air  -ND     O2 Delivery Intra Treatment room air  -ND     Post SpO2 (%) 100  -ND     O2 Delivery Post Treatment room air  -ND     Pre Patient Position Supine  -ND     Intra Patient Position Standing  -ND     Post Patient Position Sitting  -ND       Row Name 06/17/25 1047          Positioning and Restraints    Pre-Treatment Position in bed  -ND     Post Treatment Position chair  -ND     In Chair notified nsg;reclined;legs elevated;call light within reach;encouraged to call for assist;exit alarm on;waffle cushion  -ND               User Key  (r) = Recorded By, (t) = Taken By, (c) = Cosigned By      Initials Name Provider Type    Susan Galloway, PT Physical Therapist                   Outcome Measures       Row Name 06/17/25 1050          How much help from another  person do you currently need...    Turning from your back to your side while in flat bed without using bedrails? 3  -ND     Moving from lying on back to sitting on the side of a flat bed without bedrails? 3  -ND     Moving to and from a bed to a chair (including a wheelchair)? 3  -ND     Standing up from a chair using your arms (e.g., wheelchair, bedside chair)? 3  -ND     Climbing 3-5 steps with a railing? 3  -ND     To walk in hospital room? 3  -ND     AM-PAC 6 Clicks Score (PT) 18  -ND     Highest Level of Mobility Goal Walk 10 Steps or More-6  -ND       Row Name 06/17/25 1050          Functional Assessment    Outcome Measure Options AM-PAC 6 Clicks Basic Mobility (PT)  -ND               User Key  (r) = Recorded By, (t) = Taken By, (c) = Cosigned By      Initials Name Provider Type    ND Susan Lion, PT Physical Therapist                                 Physical Therapy Education       Title: PT OT SLP Therapies (Done)       Topic: Physical Therapy (Done)       Point: Mobility training (Done)       Learning Progress Summary            Patient Acceptance, E, VU by ND at 6/17/2025 1050                      Point: Home exercise program (Done)       Learning Progress Summary            Patient Acceptance, E, VU by ND at 6/17/2025 1050                      Point: Body mechanics (Done)       Learning Progress Summary            Patient Acceptance, E, VU by ND at 6/17/2025 1050                      Point: Precautions (Done)       Learning Progress Summary            Patient Acceptance, E, VU by ND at 6/17/2025 1050                                      User Key       Initials Effective Dates Name Provider Type Discipline    ND 11/16/23 -  Susan Lion, ARINA Physical Therapist PT                  PT Recommendation and Plan  Planned Therapy Interventions (PT): balance training, bed mobility training, home exercise program, gait training, patient/family education, transfer training, postural re-education, ROM  (range of motion), stair training, strengthening  Outcome Evaluation: PT evaluation completed. Pt ambulates both with and without RWx and ultimately does not need AD at this time to safely mobilize. Stair trial completed per home set up with CGA and cues for improved sequencing of task. Post-op spinal precautions reviewed with pt prior to mobilizing and he verbalizes understanding. Pt is cleared for d/c from a mobility standpoint, anticipate pt to d/c home with assist once medically appropriate.     Time Calculation:   PT Evaluation Complexity  History, PT Evaluation Complexity: 3 or more personal factors and/or comorbidities  Examination of Body Systems (PT Eval Complexity): total of 4 or more elements  Clinical Presentation (PT Evaluation Complexity): stable  Clinical Decision Making (PT Evaluation Complexity): low complexity  Overall Complexity (PT Evaluation Complexity): low complexity     PT Charges       Row Name 06/17/25 1051             Time Calculation    Start Time 0948  -ND      PT Received On 06/17/25  -ND      PT Goal Re-Cert Due Date 06/27/25  -ND         Timed Charges    95845 - PT Therapeutic Activity Minutes 10  -ND         Untimed Charges    PT Eval/Re-eval Minutes 32  -ND         Total Minutes    Timed Charges Total Minutes 10  -ND      Untimed Charges Total Minutes 32  -ND       Total Minutes 42  -ND                User Key  (r) = Recorded By, (t) = Taken By, (c) = Cosigned By      Initials Name Provider Type    ND Susan Lion, PT Physical Therapist                  Therapy Charges for Today       Code Description Service Date Service Provider Modifiers Qty    49743684206 HC PT THERAPEUTIC ACT EA 15 MIN 6/17/2025 Susan Lion, PT GP 1    94405232462 HC PT EVAL LOW COMPLEXITY 3 6/17/2025 Susan Lion, PT GP 1    59227316368 HC PT THER SUPP EA 15 MIN 6/17/2025 Susan Lion, PT GP 3            PT G-Codes  Outcome Measure Options: AM-PAC 6 Clicks Basic Mobility (PT)  AM-PAC 6  Clicks Score (PT): 18  PT Discharge Summary  Anticipated Discharge Disposition (PT): home with assist    Susan Lion, PT  6/17/2025

## 2025-06-17 NOTE — PLAN OF CARE
Goal Outcome Evaluation:                 Patient continues on current POC, ambulating stand by assist, pain controlled with PRN pain meds, no further needs at this time, anticipates home at d/c

## 2025-06-17 NOTE — PLAN OF CARE
Problem: Adult Inpatient Plan of Care  Goal: Absence of Hospital-Acquired Illness or Injury  Outcome: Progressing  Intervention: Identify and Manage Fall Risk  Recent Flowsheet Documentation  Taken 6/17/2025 0015 by Laura Felipe RN  Safety Promotion/Fall Prevention: safety round/check completed  Taken 6/16/2025 2000 by Laura Felipe RN  Safety Promotion/Fall Prevention:   assistive device/personal items within reach   clutter free environment maintained   fall prevention program maintained   gait belt   nonskid shoes/slippers when out of bed   room organization consistent   safety round/check completed  Intervention: Prevent Skin Injury  Recent Flowsheet Documentation  Taken 6/17/2025 0015 by Laura Felipe RN  Body Position: position maintained  Taken 6/16/2025 2000 by Laura Felipe RN  Body Position: position changed independently  Skin Protection: incontinence pads utilized  Intervention: Prevent and Manage VTE (Venous Thromboembolism) Risk  Recent Flowsheet Documentation  Taken 6/17/2025 0015 by Laura Felipe RN  VTE Prevention/Management:   bilateral   SCDs (sequential compression devices) on  Taken 6/16/2025 2000 by Laura Felipe RN  VTE Prevention/Management:   bilateral   SCDs (sequential compression devices) on   Goal Outcome Evaluation:

## 2025-06-17 NOTE — THERAPY EVALUATION
Patient Name: Bonilla Sterling Jr.  : 1960    MRN: [id number]                              Today's Date: 2025       Admit Date: 2025    Visit Dx:     ICD-10-CM ICD-9-CM   1. Lumbar stenosis with neurogenic claudication  M48.062 724.03     Patient Active Problem List   Diagnosis    Hypertension    Gastroesophageal reflux disease without esophagitis    Bulging of lumbar intervertebral disc    Lumbar stenosis with neurogenic claudication     Past Medical History:   Diagnosis Date    Acid reflux     Anxiety     Arthritis     OSTEO    Cervical disc disorder     Bulging disc    Chronic pain disorder     Back     CTS (carpal tunnel syndrome)     Elevated cholesterol     Gout     HL (hearing loss)     Hypertension     Low back pain     Lumbosacral disc disease     Bulging disc    Memory loss     Have an appointment to confirm    Peripheral neuropathy     Sinus headache     Sleep apnea     DOES NOT USE CPAP    Thoracic disc disorder     Bulging disc    Vision loss     Wears glasses     Wears partial dentures     Full upper plate - advised no adhesives DOS     Past Surgical History:   Procedure Laterality Date    CARPAL TUNNEL RELEASE Bilateral     COLONOSCOPY  2009    COLONOSCOPY N/A 2020    Procedure: COLONOSCOPY;  Surgeon: Chantale Naik MD;  Location: [facility] ENDOSCOPY;  Service: Gastroenterology;  Laterality: N/A;    CYST REMOVAL      FOREHEAD, SEBACEOUS CYST    LUMBAR LAMINECTOMY WITH FUSION N/A 2025    Procedure: LUMBAR FUSION DECOMPRESSION WITH PEDICLE SCREWS L4-5;  Surgeon: Yang Whitley MD;  Location: [facility] OR;  Service: Neurosurgery;  Laterality: N/A;    MOUTH SURGERY      Oral extractions    ORIF WRIST FRACTURE Right       General Information       Row Name 25 1444          OT Time and Intention    Document Type evaluation  -MR     Mode of Treatment occupational therapy  -MR       Row Name 25 1444          General Information    Patient  Profile Reviewed yes  -MR     Prior Level of Function independent:;all household mobility;community mobility;gait;transfer;bed mobility;using stairs  Pt denies using AD at baseline. Denies falls.  -MR     Existing Precautions/Restrictions fall;spinal;other (see comments)  Hemovac  -MR     Barriers to Rehab none identified  -MR       Row Name 06/17/25 1444          Living Environment    Current Living Arrangements home  -MR     People in Home spouse  -MR       Row Name 06/17/25 1444          Home Main Entrance    Number of Stairs, Main Entrance four  -MR     Stair Railings, Main Entrance railing on right side (ascending)  -MR       Row Name 06/17/25 1444          Stairs Within Home, Primary    Number of Stairs, Within Home, Primary none  -MR       Row Name 06/17/25 1444          Cognition    Orientation Status (Cognition) oriented x 4  -MR       Row Name 06/17/25 1444          Safety Issues/Impairments Affecting Functional Mobility    Safety Issues Affecting Function (Mobility) safety precaution awareness;sequencing abilities  -MR     Impairments Affecting Function (Mobility) balance;endurance/activity tolerance;strength;pain  -MR               User Key  (r) = Recorded By, (t) = Taken By, (c) = Cosigned By      Initials Name Provider Type    MR Hailee Bucio, OT Occupational Therapist                     Mobility/ADL's       Row Name 06/17/25 1446          Bed Mobility    Bed Mobility sit-sidelying;rolling left  -MR     Rolling Left [place] (Bed Mobility) supervision;verbal cues  -MR     Sit-Sidelying [place] (Bed Mobility) supervision;verbal cues  -MR     Assistive Device (Bed Mobility) head of bed elevated;bed rails  -MR     Comment, (Bed Mobility) v/c for log rolling.  -MR       Row Name 06/17/25 1446          Transfers    Transfers sit-stand transfer  -MR       Row Name 06/17/25 1446          Sit-Stand Transfer    Sit-Stand [place] (Transfers) contact guard  -MR     Assistive Device (Sit-Stand  Transfers) walker, front-wheeled  -MR       Row Name 06/17/25 1446          Functional Mobility    Functional Mobility- Ind. Level supervision required  -MR     Functional Mobility-Distance (Feet) --  > HH distances  -MR       Row Name 06/17/25 1446          Activities of Daily Living    BADL Assessment/Intervention bathing;lower body dressing;toileting  -MR       Row Name 06/17/25 1446          Bathing Assessment/Intervention    [place] Level (Bathing) bathing skills  -MR     Assistive Devices (Bathing) long-handled sponge  -MR     Comment, (Bathing) OT issued and educated on use of long handled sponge for increased I and safety w/ bathing while limited by spinal precautions.  -MR       Row Name 06/17/25 1446          Lower Body Dressing Assessment/Training    [place] Level (Lower Body Dressing) lower body dressing skills  -MR     Assistive Devices (Lower Body Dressing) long-handled shoe horn;reacher;sock-aid  -MR     Comment, (Lower Body Dressing) OT issued and educated pt on use of AE for increased I and safety w/ ADLs while limited by spinal precautions.  -       Row Name 06/17/25 1446          Toileting Assessment/Training    [place] Level (Toileting) toileting skills  -MR     Assistive Devices (Toileting) toilet paper aid  -MR     Comment, (Toileting) OT issued and educated pt on use of toilet paper aide for increased I and safety w/ toileting tasks.  -MR               User Key  (r) = Recorded By, (t) = Taken By, (c) = Cosigned By      Initials Name Provider Type     Hailee Bucio OT Occupational Therapist                   Obj/Interventions       Row Name 06/17/25 1450          Sensory Assessment (Somatosensory)    Sensory Assessment (Somatosensory) UE sensation intact  -MR       Row Name 06/17/25 1450          Vision Assessment/Intervention    Visual Impairment/Limitations WFL;corrective lenses full-time  -MR       Row Name 06/17/25 1450          Range of Motion Comprehensive    General  Range of Motion bilateral upper extremity ROM WFL  -MR       Row Name 06/17/25 2370          Balance    Balance Assessment sitting static balance;sitting dynamic balance;standing static balance;standing dynamic balance  -MR     Static Sitting Balance standby assist  -MR     Dynamic Sitting Balance standby assist  -MR     Position, Sitting Balance unsupported;sitting edge of bed  -MR     Static Standing Balance contact guard  -MR     Dynamic Standing Balance contact guard  -MR     Position/Device Used, Standing Balance supported;walker, front-wheeled  -MR     Balance Interventions sitting;standing;sit to stand;supported;static;dynamic  -MR               User Key  (r) = Recorded By, (t) = Taken By, (c) = Cosigned By      Initials Name Provider Type    Hailee Castillo, OT Occupational Therapist                   Goals/Plan       Row Name 06/17/25 1501          Bed Mobility Goal 1 (OT)    Activity/Assistive Device (Bed Mobility Goal 1, OT) rolling to left;rolling to right;sidelying to sit;sit to sidelying  -MR     [place] Level/Cues Needed (Bed Mobility Goal 1, OT) independent  -MR     Time Frame (Bed Mobility Goal 1, OT) short term goal (STG);3 days  -MR     Progress/Outcomes (Bed Mobility Goal 1, OT) new goal  -MR       Row Name 06/17/25 1501          Transfer Goal 1 (OT)    Activity/Assistive Device (Transfer Goal 1, OT) sit-to-stand/stand-to-sit;toilet  -MR     [place] Level/Cues Needed (Transfer Goal 1, OT) supervision required  -MR     Time Frame (Transfer Goal 1, OT) long term goal (LTG);5 days  -MR     Progress/Outcome (Transfer Goal 1, OT) new goal  -MR       Row Name 06/17/25 1501          Dressing Goal 1 (OT)    Activity/Device (Dressing Goal 1, OT) lower body dressing;long-handled shoe horn;reacher;sock-aid  -MR     [place]/Cues Needed (Dressing Goal 1, OT) contact guard required  -MR     Time Frame (Dressing Goal 1, OT) long term goal (LTG);5 days  -MR     Progress/Outcome (Dressing Goal 1,  OT) new goal  -MR       Row Name 06/17/25 1501          Therapy Assessment/Plan (OT)    Planned Therapy Interventions (OT) activity tolerance training;adaptive equipment training;BADL retraining;functional balance retraining;transfer/mobility retraining;strengthening exercise;ROM/therapeutic exercise;patient/caregiver education/training;passive ROM/stretching;IADL retraining;occupation/activity based interventions  -MR               User Key  (r) = Recorded By, (t) = Taken By, (c) = Cosigned By      Initials Name Provider Type    MR [place]ortun[place]Hailee, OT Occupational Therapist                   Clinical Impression       Row Name 06/17/25 1451          Pain Assessment    Pretreatment Pain Rating 4/10  -MR     Posttreatment Pain Rating 4/10  -MR     Pain Location back  -MR     Pain Side/Orientation generalized;lower  -MR       Row Name 06/17/25 1451          Plan of Care Review    Plan of Care Reviewed With patient  -MR     Progress no change  Initial Eval  -MR     Outcome Evaluation Patient presenting below his functional baseline w/ mobility, transfers and endurance limited by back pain. Pt demonstrating good recall of spinal precautions. OT issued and educated pt on use of AE for increased I w/ ADLs while limited by spinal precautions. Deficits warrant skilled OT services. Recommend home w/ assist when medically appropriate for d/c.  -MR       Row Name 06/17/25 1454          Therapy Assessment/Plan (OT)    Patient/Family Therapy Goal Statement (OT) Return to PLOF  -MR     Rehab Potential (OT) good  -MR     Criteria for Skilled Therapeutic Interventions Met (OT) yes;meets criteria;skilled treatment is necessary  -MR     Therapy Frequency (OT) daily  -MR     Predicted Duration of Therapy Intervention (OT) 5 days  -MR       Row Name 06/17/25 1451          Vital Signs    Pre Systolic BP Rehab 133  -MR     Pre Treatment Diastolic BP 81  -MR     Pre SpO2 (%) 100  -MR     O2 Delivery Pre Treatment room air  -MR     O2  Delivery Intra Treatment room air  -MR     O2 Delivery Post Treatment room air  -MR     Pre Patient Position Supine  -MR     Intra Patient Position Standing  -MR     Post Patient Position Sitting  -MR       Row Name 06/17/25 1451          Positioning and Restraints    Pre-Treatment Position sitting in chair/recliner  -MR     Post Treatment Position bed  -MR     In Bed notified nsg;encouraged to call for assist;exit alarm on;side rails up x2;fowlers;call light within reach;SCD pump applied  -MR               User Key  (r) = Recorded By, (t) = Taken By, (c) = Cosigned By      Initials Name Provider Type    Haliee Castillo, OT Occupational Therapist                   Outcome Measures       Row Name 06/17/25 1503          How much help from another is currently needed...    Putting on and taking off regular lower body clothing? 3  -MR     Bathing (including washing, rinsing, and drying) 3  -MR     Toileting (which includes using toilet bed pan or urinal) 3  -MR     Putting on and taking off regular upper body clothing 4  -MR     Taking care of personal grooming (such as brushing teeth) 4  -MR     Eating meals 4  -MR     AM-PAC 6 Clicks Score (OT) 21  -MR       Row Name 06/17/25 1050          How much help from another person do you currently need...    Turning from your back to your side while in flat bed without using bedrails? 3  -ND     Moving from lying on back to sitting on the side of a flat bed without bedrails? 3  -ND     Moving to and from a bed to a chair (including a wheelchair)? 3  -ND     Standing up from a chair using your arms (e.g., wheelchair, bedside chair)? 3  -ND     Climbing 3-5 steps with a railing? 3  -ND     To walk in hospital room? 3  -ND     AM-PAC 6 Clicks Score (PT) 18  -ND     Highest Level of Mobility Goal Walk 10 Steps or More-6  -ND       Row Name 06/17/25 1503 06/17/25 1050       Functional Assessment    Outcome Measure Options AM-PAC 6 Clicks Daily Activity (OT)  - AM-PAC 6 Clicks  Basic Mobility (PT)  -ND              User Key  (r) = Recorded By, (t) = Taken By, (c) = Cosigned By      Initials Name Provider Type    MR Hailee Bucio, OT Occupational Therapist    ND Susan Lion, ARINA Physical Therapist                    Occupational Therapy Education       Title: PT OT SLP Therapies (Done)       Topic: Occupational Therapy (Done)       Point: ADL training (Done)       Learning Progress Summary            Patient Acceptance, E, VU by MR at 6/17/2025 1504                      Point: Home exercise program (Done)       Learning Progress Summary            Patient Acceptance, E, VU by MR at 6/17/2025 1504                      Point: Precautions (Done)       Learning Progress Summary            Patient Acceptance, E, VU by MR at 6/17/2025 1504                      Point: Body mechanics (Done)       Learning Progress Summary            Patient Acceptance, E, VU by MR at 6/17/2025 1504                                      User Key       Initials Effective Dates Name Provider Type Discipline    MR 09/22/22 -  Hailee Bucio, OT Occupational Therapist OT                  OT Recommendation and Plan  Planned Therapy Interventions (OT): activity tolerance training, adaptive equipment training, BADL retraining, functional balance retraining, transfer/mobility retraining, strengthening exercise, ROM/therapeutic exercise, patient/caregiver education/training, passive ROM/stretching, IADL retraining, occupation/activity based interventions  Therapy Frequency (OT): daily  Plan of Care Review  Plan of Care Reviewed With: patient  Progress: no change (Initial Eval)  Outcome Evaluation: Patient presenting below his functional baseline w/ mobility, transfers and endurance limited by back pain. Pt demonstrating good recall of spinal precautions. OT issued and educated pt on use of AE for increased I w/ ADLs while limited by spinal precautions. Deficits warrant skilled OT services. Recommend home w/ assist when  medically appropriate for d/c.     Time Calculation:   Evaluation Complexity (OT)  Review Occupational Profile/Medical/Therapy History Complexity: brief/low complexity  Assessment, Occupational Performance/Identification of Deficit Complexity: 1-3 performance deficits  Clinical Decision Making Complexity (OT): problem focused assessment/low complexity  Overall Complexity of Evaluation (OT): low complexity     Time Calculation- OT       Row Name 06/17/25 1507             Time Calculation- OT    OT Start Time 1113  -MR      OT Received On 06/17/25  -MR      OT Goal Re-Cert Due Date 06/27/25  -MR         Untimed Charges    OT Eval/Re-eval Minutes 46  -MR         Total Minutes    Untimed Charges Total Minutes 46  -MR       Total Minutes 46  -MR                User Key  (r) = Recorded By, (t) = Taken By, (c) = Cosigned By      Initials Name Provider Type    MR Hailee Bucio OT Occupational Therapist                  Therapy Charges for Today       Code Description Service Date Service Provider Modifiers Qty    39097549778 HC OT EVAL LOW COMPLEXITY 4 6/17/2025 Hailee Bucio OT GO 1                 Hailee Bucio OT  6/17/2025

## 2025-06-18 PROCEDURE — 99024 POSTOP FOLLOW-UP VISIT: CPT | Performed by: NEUROLOGICAL SURGERY

## 2025-06-18 PROCEDURE — 97530 THERAPEUTIC ACTIVITIES: CPT

## 2025-06-18 RX ADMIN — POLYETHYLENE GLYCOL 3350 17 G: 17 POWDER, FOR SOLUTION ORAL at 08:46

## 2025-06-18 RX ADMIN — FAMOTIDINE 20 MG: 20 TABLET, FILM COATED ORAL at 20:21

## 2025-06-18 RX ADMIN — DONEPEZIL HYDROCHLORIDE 10 MG: 10 TABLET ORAL at 20:21

## 2025-06-18 RX ADMIN — FAMOTIDINE 20 MG: 20 TABLET, FILM COATED ORAL at 08:46

## 2025-06-18 RX ADMIN — OXYCODONE HYDROCHLORIDE AND ACETAMINOPHEN 1 TABLET: 7.5; 325 TABLET ORAL at 08:46

## 2025-06-18 RX ADMIN — Medication 10 ML: at 09:00

## 2025-06-18 RX ADMIN — DOCUSATE SODIUM 50 MG AND SENNOSIDES 8.6 MG 2 TABLET: 8.6; 5 TABLET, FILM COATED ORAL at 08:45

## 2025-06-18 RX ADMIN — AMITRIPTYLINE HYDROCHLORIDE 20 MG: 10 TABLET, FILM COATED ORAL at 20:21

## 2025-06-18 RX ADMIN — ALLOPURINOL 100 MG: 100 TABLET ORAL at 08:46

## 2025-06-18 RX ADMIN — OXYCODONE HYDROCHLORIDE AND ACETAMINOPHEN 1 TABLET: 7.5; 325 TABLET ORAL at 15:46

## 2025-06-18 RX ADMIN — Medication 10 ML: at 20:22

## 2025-06-18 RX ADMIN — ATORVASTATIN CALCIUM 20 MG: 20 TABLET, FILM COATED ORAL at 20:21

## 2025-06-18 RX ADMIN — HYDROCHLOROTHIAZIDE 25 MG: 25 TABLET ORAL at 08:47

## 2025-06-18 RX ADMIN — DULOXETINE 60 MG: 60 CAPSULE, DELAYED RELEASE ORAL at 08:46

## 2025-06-18 RX ADMIN — LISINOPRIL 20 MG: 20 TABLET ORAL at 08:47

## 2025-06-18 RX ADMIN — DOCUSATE SODIUM 50 MG AND SENNOSIDES 8.6 MG 2 TABLET: 8.6; 5 TABLET, FILM COATED ORAL at 20:21

## 2025-06-18 NOTE — PROGRESS NOTES
NEUROSURGERY PROGRESS NOTE     LOS: 1 day   Patient Care Team:  Christin Yeh APRN as PCP - General (Family Medicine)  Christin Yeh APRN as Nurse Practitioner (Family Medicine)    Chief Complaint: Low back pain with walking and standing intolerance.    POD#: 2 Days Post-Op  Procedures:  L4-5 PLIF.    Interval History:   Patient Complaints: Incisional pain.  Patient Denies: Preoperative claudication symptoms or voiding difficulty.    Vital Signs: Blood pressure 135/97, pulse 94, temperature 98.1 °F (36.7 °C), temperature source Oral, resp. rate 16, SpO2 97%.  Intake/Output:   Intake/Output Summary (Last 24 hours) at 6/18/2025 0807  Last data filed at 6/18/2025 0326  Gross per 24 hour   Intake 960 ml   Output 2340 ml   Net -1380 ml     Drain output: 170/220 mL    Physical Exam:  The patient is alert and in good spirits.  Motor function is intact in his lower extremities.     Data Review:    Results from last 7 days   Lab Units 06/17/25 2045   HEMOGLOBIN g/dL 12.6*   HEMATOCRIT % 41.0         Assessment/Plan:  1.  Lumbar spondylolisthesis, stenosis, instability status post decompression with fusion and stabilization.  2.  Hypertension.  3.  Obesity.  4.  Disposition: Mobilize patient.  I anticipate that he will be discharged home in a day or two once drain output has diminished.      Yang Whitley MD  06/18/25  08:07 EDT

## 2025-06-18 NOTE — PLAN OF CARE
Goal Outcome Evaluation:  Plan of Care Reviewed With: patient, spouse        Progress: improving  Outcome Evaluation: Pt ambulates with improved mechanics and stability this date with no overt LOB or knee buckling noted. Post-op day 2 HEP reviewed with pt and he completes without issues or increased pain. Pt is cleared from a mobility standpoint. Recommend home with assist once medically appropriate for d/c.    Anticipated Discharge Disposition (PT): home with assist

## 2025-06-18 NOTE — THERAPY TREATMENT NOTE
Patient Name: Bonilla Sterling Jr.  : 1960    MRN: [id number]                              Today's Date: 2025       Admit Date: 2025    Visit Dx:     ICD-10-CM ICD-9-CM   1. Lumbar stenosis with neurogenic claudication  M48.062 724.03     Patient Active Problem List   Diagnosis    Hypertension    Gastroesophageal reflux disease without esophagitis    Bulging of lumbar intervertebral disc    Lumbar stenosis with neurogenic claudication     Past Medical History:   Diagnosis Date    Acid reflux     Anxiety     Arthritis     OSTEO    Cervical disc disorder     Bulging disc    Chronic pain disorder     Back     CTS (carpal tunnel syndrome)     Elevated cholesterol     Gout     HL (hearing loss)     Hypertension     Low back pain     Lumbosacral disc disease     Bulging disc    Memory loss     Have an appointment to confirm    Peripheral neuropathy     Sinus headache     Sleep apnea     DOES NOT USE CPAP    Thoracic disc disorder     Bulging disc    Vision loss     Wears glasses     Wears partial dentures     Full upper plate - advised no adhesives DOS     Past Surgical History:   Procedure Laterality Date    CARPAL TUNNEL RELEASE Bilateral     COLONOSCOPY  2009    COLONOSCOPY N/A 2020    Procedure: COLONOSCOPY;  Surgeon: Chantale Naik MD;  Location: [facility] ENDOSCOPY;  Service: Gastroenterology;  Laterality: N/A;    CYST REMOVAL      FOREHEAD, SEBACEOUS CYST    LUMBAR LAMINECTOMY WITH FUSION N/A 2025    Procedure: LUMBAR FUSION DECOMPRESSION WITH PEDICLE SCREWS L4-5;  Surgeon: Yang Whitley MD;  Location: [facility] OR;  Service: Neurosurgery;  Laterality: N/A;    MOUTH SURGERY      Oral extractions    ORIF WRIST FRACTURE Right       General Information       Row Name 25 1436          Physical Therapy Time and Intention    Document Type therapy note (daily note)  -ND     Mode of Treatment physical therapy  -ND       Row Name 25 1436          General  Information    Patient Profile Reviewed yes  -ND     Existing Precautions/Restrictions fall;spinal;other (see comments)  Hemovac  -ND     Barriers to Rehab none identified  -ND       Row Name 06/18/25 1436          Cognition    Orientation Status (Cognition) oriented x 4  -ND       Row Name 06/18/25 1436          Safety Issues/Impairments Affecting Functional Mobility    Safety Issues Affecting Function (Mobility) safety precaution awareness  -ND     Impairments Affecting Function (Mobility) pain  -ND               User Key  (r) = Recorded By, (t) = Taken By, (c) = Cosigned By      Initials Name Provider Type    ND Susan Lion, PT Physical Therapist                   Mobility       Row Name 06/18/25 1437          Bed Mobility    Bed Mobility supine-sit  -ND     Supine-Sit [place] (Bed Mobility) modified independence  -ND     Comment, (Bed Mobility) Pt uses log roll technique  -ND       Row Name 06/18/25 1437          Sit-Stand Transfer    Sit-Stand [place] (Transfers) standby assist  -ND     Comment, (Sit-Stand Transfer) from EOB  -ND       Row Name 06/18/25 1437          Gait/Stairs (Locomotion)    [place] Level (Gait) standby assist  -ND     Patient was able to Ambulate yes  -ND     Distance in Feet (Gait) 550  -ND     Deviations/Abnormal Patterns (Gait) choco decreased;stride length decreased  -ND     Comment, (Gait/Stairs) Pt ambulates with step-through gait pattern and SBA without gross gait deviations noted. Denies increased pain with mobility.  -ND               User Key  (r) = Recorded By, (t) = Taken By, (c) = Cosigned By      Initials Name Provider Type    ND Susan Lion PT Physical Therapist                   Obj/Interventions       Row Name 06/18/25 1438          Motor Skills    Therapeutic Exercise shoulder;hip  -ND       Row Name 06/18/25 1438          Shoulder (Therapeutic Exercise)    Shoulder (Therapeutic Exercise) AROM (active range of motion)  -ND     Shoulder AROM  (Therapeutic Exercise) bilateral;flexion;extension;10 repetitions  -ND       Row Name 06/18/25 1438          Hip (Therapeutic Exercise)    Hip (Therapeutic Exercise) AROM (active range of motion)  -ND     Hip AROM (Therapeutic Exercise) bilateral;external rotation;internal rotation;aBduction;aDduction;10 repetitions  -ND       Row Name 06/18/25 1438          Knee (Therapeutic Exercise)    Knee (Therapeutic Exercise) strengthening exercise  -ND     Knee Strengthening (Therapeutic Exercise) bilateral;heel slides;10 repetitions  -ND       Row Name 06/18/25 1438          Balance    Balance Assessment sitting static balance;sitting dynamic balance;standing static balance;standing dynamic balance  -ND     Static Sitting Balance independent  -ND     Dynamic Sitting Balance independent  -ND     Position, Sitting Balance unsupported;sitting edge of bed  -ND     Static Standing Balance standby assist  -ND     Dynamic Standing Balance standby assist  -ND     Position/Device Used, Standing Balance unsupported  -ND     Balance Interventions sitting;standing;sit to stand;supported;static;dynamic  -ND               User Key  (r) = Recorded By, (t) = Taken By, (c) = Cosigned By      Initials Name Provider Type    ND Susan Lion, PT Physical Therapist                   Goals/Plan    No documentation.                  Clinical Impression       Row Name 06/18/25 1438          Pain    Pretreatment Pain Rating 3/10  -ND     Posttreatment Pain Rating 2/10  -ND     Pain Location back  -ND     Pain Side/Orientation generalized  -ND     Pain Management Interventions positioning techniques utilized;exercise or physical activity utilized;activity modification encouraged  -ND     Response to Pain Interventions intervention effective per patient report  -ND       Row Name 06/18/25 1438          Plan of Care Review    Plan of Care Reviewed With patient;spouse  -ND     Progress improving  -ND     Outcome Evaluation Pt ambulates with  improved mechanics and stability this date with no overt LOB or knee buckling noted. Post-op day 2 HEP reviewed with pt and he completes without issues or increased pain. Pt is cleared from a mobility standpoint. Recommend home with assist once medically appropriate for d/c.  -ND       Row Name 06/18/25 1438          Vital Signs    Pre Systolic BP Rehab 126  -ND     Pre Treatment Diastolic BP 88  -ND     Pretreatment Heart Rate (beats/min) 99  -ND     Posttreatment Heart Rate (beats/min) 113  -ND     Pre SpO2 (%) 96  -ND     O2 Delivery Pre Treatment room air  -ND     O2 Delivery Intra Treatment room air  -ND     Post SpO2 (%) 99  -ND     O2 Delivery Post Treatment room air  -ND     Pre Patient Position Supine  -ND     Intra Patient Position Standing  -ND     Post Patient Position Sitting  -ND       Row Name 06/18/25 1438          Positioning and Restraints    Pre-Treatment Position in bed  -ND     Post Treatment Position bed  -ND     In Bed notified nsg;supine;with family/caregiver;call light within reach;encouraged to call for assist  exit alarm unchanged.  -ND               User Key  (r) = Recorded By, (t) = Taken By, (c) = Cosigned By      Initials Name Provider Type    Susan Galloway, ARINA Physical Therapist                   Outcome Measures       Row Name 06/18/25 1440 06/18/25 0810       How much help from another person do you currently need...    Turning from your back to your side while in flat bed without using bedrails? 4  -ND 4  -BM    Moving from lying on back to sitting on the side of a flat bed without bedrails? 3  -ND 4  -BM    Moving to and from a bed to a chair (including a wheelchair)? 3  -ND 4  -BM    Standing up from a chair using your arms (e.g., wheelchair, bedside chair)? 4  -ND 4  -BM    Climbing 3-5 steps with a railing? 3  -ND 3  -BM    To walk in hospital room? 3  -ND 3  -BM    AM-PAC 6 Clicks Score (PT) 20  -ND 22  -BM    Highest Level of Mobility Goal Walk 10 Steps or More-6  -ND  Walk 25 Feet or More-7  -BM      Row Name 06/18/25 1440          Functional Assessment    Outcome Measure Options AM-PAC 6 Clicks Basic Mobility (PT)  -ND               User Key  (r) = Recorded By, (t) = Taken By, (c) = Cosigned By      Initials Name Provider Type    ND Susan Lion, ARINA Physical Therapist    Luisa Emery, RN Registered Nurse                                 Physical Therapy Education       Title: PT OT SLP Therapies (Done)       Topic: Physical Therapy (Done)       Point: Mobility training (Done)       Learning Progress Summary            Patient Acceptance, E, VU by ND at 6/18/2025 1440    Acceptance, E, VU by ND at 6/17/2025 1050                      Point: Home exercise program (Done)       Learning Progress Summary            Patient Acceptance, E, VU by ND at 6/18/2025 1440    Acceptance, E, VU by ND at 6/17/2025 1050                      Point: Body mechanics (Done)       Learning Progress Summary            Patient Acceptance, E, VU by ND at 6/18/2025 1440    Acceptance, E, VU by ND at 6/17/2025 1050                      Point: Precautions (Done)       Learning Progress Summary            Patient Acceptance, E, VU by ND at 6/18/2025 1440    Acceptance, E, VU by ND at 6/17/2025 1050                                      User Key       Initials Effective Dates Name Provider Type Discipline    ND 11/16/23 -  Susan Lion PT Physical Therapist PT                  PT Recommendation and Plan  Planned Therapy Interventions (PT): balance training, bed mobility training, home exercise program, gait training, patient/family education, transfer training, postural re-education, ROM (range of motion), stair training, strengthening  Progress: improving  Outcome Evaluation: Pt ambulates with improved mechanics and stability this date with no overt LOB or knee buckling noted. Post-op day 2 HEP reviewed with pt and he completes without issues or increased pain. Pt is cleared from a  mobility standpoint. Recommend home with assist once medically appropriate for d/c.     Time Calculation:   PT Evaluation Complexity  History, PT Evaluation Complexity: 3 or more personal factors and/or comorbidities  Examination of Body Systems (PT Eval Complexity): total of 4 or more elements  Clinical Presentation (PT Evaluation Complexity): stable  Clinical Decision Making (PT Evaluation Complexity): low complexity  Overall Complexity (PT Evaluation Complexity): low complexity     PT Charges       Row Name 06/18/25 1441             Time Calculation    Start Time 1329  -ND      PT Received On 06/18/25  -ND         Timed Charges    95241 - PT Therapeutic Activity Minutes 11 -ND         Total Minutes    Timed Charges Total Minutes 11 -ND       Total Minutes 11 -ND                User Key  (r) = Recorded By, (t) = Taken By, (c) = Cosigned By      Initials Name Provider Type    ND Susan Lion, PT Physical Therapist                  Therapy Charges for Today       Code Description Service Date Service Provider Modifiers Qty    17980097347 HC PT THERAPEUTIC ACT EA 15 MIN 6/17/2025 Susan Lion, PT GP 1    23160385993 HC PT EVAL LOW COMPLEXITY 3 6/17/2025 Susan Lion, PT GP 1    44718068139 HC PT THER SUPP EA 15 MIN 6/17/2025 Susan Lion, PT GP 3    71158026652 HC PT THERAPEUTIC ACT EA 15 MIN 6/18/2025 Susan Lion, PT GP 1            PT G-Codes  Outcome Measure Options: AM-PAC 6 Clicks Basic Mobility (PT)  AM-PAC 6 Clicks Score (PT): 20  AM-PAC 6 Clicks Score (OT): 21  PT Discharge Summary  Anticipated Discharge Disposition (PT): home with assist    Susan Lion PT  6/18/2025

## 2025-06-18 NOTE — PLAN OF CARE
Goal Outcome Evaluation:                   Patient is alert and oriented x 4; On room air; Vital signs are stable; Ambulates stand-by assist, ambulated around unit today; Pain is being controlled by PO pain medication; Patient is voiding up to toilet; Patient and spouse are very pleasant.

## 2025-06-18 NOTE — PLAN OF CARE
Problem: Adult Inpatient Plan of Care  Goal: Absence of Hospital-Acquired Illness or Injury  Outcome: Progressing  Intervention: Identify and Manage Fall Risk  Recent Flowsheet Documentation  Taken 6/18/2025 0010 by Laura Felipe RN  Safety Promotion/Fall Prevention: safety round/check completed  Taken 6/17/2025 2006 by Laura Felipe RN  Safety Promotion/Fall Prevention:   assistive device/personal items within reach   clutter free environment maintained   fall prevention program maintained   gait belt   nonskid shoes/slippers when out of bed   room organization consistent   safety round/check completed  Intervention: Prevent Skin Injury  Recent Flowsheet Documentation  Taken 6/18/2025 0010 by Laura Felipe RN  Body Position: position changed independently  Taken 6/17/2025 2006 by Laura Felipe RN  Body Position: position changed independently  Skin Protection: incontinence pads utilized  Intervention: Prevent and Manage VTE (Venous Thromboembolism) Risk  Recent Flowsheet Documentation  Taken 6/18/2025 0010 by Laura Felipe RN  VTE Prevention/Management:   bilateral   SCDs (sequential compression devices) on  Taken 6/17/2025 2006 by Laura Felipe RN  VTE Prevention/Management:   bilateral   SCDs (sequential compression devices) on  Intervention: Prevent Infection  Recent Flowsheet Documentation  Taken 6/17/2025 2006 by Laura Felipe RN  Infection Prevention:   environmental surveillance performed   hand hygiene promoted   rest/sleep promoted   single patient room provided   Goal Outcome Evaluation:

## 2025-06-18 NOTE — CASE MANAGEMENT/SOCIAL WORK
Continued Stay Note  [facility]     Patient Name: Bonilla Sterling Jr.  MRN: [id number]  Today's Date: 6/18/2025    Admit Date: 6/16/2025    Plan: Home   Discharge Plan       Row Name 06/18/25 1029       Plan    Plan Comments Plan is to discharge home once drainage has stopped. Pt denies any dc needs at this time. CM to follow                   Discharge Codes    No documentation.                       Rosalva Dial RN

## 2025-06-19 ENCOUNTER — TELEPHONE (OUTPATIENT)
Dept: NEUROSURGERY | Facility: CLINIC | Age: 65
End: 2025-06-19
Payer: MEDICARE

## 2025-06-19 VITALS
RESPIRATION RATE: 16 BRPM | SYSTOLIC BLOOD PRESSURE: 138 MMHG | OXYGEN SATURATION: 96 % | TEMPERATURE: 98.6 F | DIASTOLIC BLOOD PRESSURE: 99 MMHG | HEART RATE: 85 BPM

## 2025-06-19 DIAGNOSIS — M48.062 SPINAL STENOSIS, LUMBAR REGION, WITH NEUROGENIC CLAUDICATION: Primary | ICD-10-CM

## 2025-06-19 DIAGNOSIS — M51.9 LUMBAR DISC DISEASE: ICD-10-CM

## 2025-06-19 PROCEDURE — 99024 POSTOP FOLLOW-UP VISIT: CPT | Performed by: NEUROLOGICAL SURGERY

## 2025-06-19 RX ORDER — OXYCODONE AND ACETAMINOPHEN 7.5; 325 MG/1; MG/1
1 TABLET ORAL 3 TIMES DAILY PRN
Qty: 15 TABLET | Refills: 0 | Status: SHIPPED | OUTPATIENT
Start: 2025-06-19 | End: 2025-06-25 | Stop reason: HOSPADM

## 2025-06-19 RX ADMIN — LISINOPRIL 20 MG: 20 TABLET ORAL at 09:47

## 2025-06-19 RX ADMIN — DULOXETINE 60 MG: 60 CAPSULE, DELAYED RELEASE ORAL at 09:47

## 2025-06-19 RX ADMIN — HYDROCHLOROTHIAZIDE 25 MG: 25 TABLET ORAL at 09:47

## 2025-06-19 RX ADMIN — Medication 10 ML: at 09:47

## 2025-06-19 RX ADMIN — ALLOPURINOL 100 MG: 100 TABLET ORAL at 09:47

## 2025-06-19 RX ADMIN — FAMOTIDINE 20 MG: 20 TABLET, FILM COATED ORAL at 09:47

## 2025-06-19 RX ADMIN — DOCUSATE SODIUM 50 MG AND SENNOSIDES 8.6 MG 2 TABLET: 8.6; 5 TABLET, FILM COATED ORAL at 09:47

## 2025-06-19 NOTE — PROGRESS NOTES
NEUROSURGERY PROGRESS NOTE     LOS: 1 day   Patient Care Team:  Christin Yeh APRN as PCP - General (Family Medicine)  Christin Yeh APRN as Nurse Practitioner (Family Medicine)    Chief Complaint: Low back pain with walking and standing intolerance.    POD#: 3 Days Post-Op  Procedures:  L4-5 PLIF.    Interval History:   Patient Complaints: Incisional pain.  Patient Denies: Preoperative claudication symptoms or voiding difficulty.    Vital Signs: Blood pressure 128/82, pulse 96, temperature 98.7 °F (37.1 °C), temperature source Oral, resp. rate 16, SpO2 95%.  Intake/Output:   Intake/Output Summary (Last 24 hours) at 6/19/2025 0624  Last data filed at 6/18/2025 2000  Gross per 24 hour   Intake --   Output 160 ml   Net -160 ml     Drain output: 130/30 mL.    Physical Exam:  Patient awakens easily and is in good spirits.  Dry dressing is in place on his incision.     Assessment/Plan:  1.  Lumbar spondylolisthesis, stenosis, instability status post decompression with fusion and stabilization.  2.  Hypertension.  3.  Obesity.  4.  Disposition: Mobilize patient.  Drain out.  Home early afternoon today.  Follow-up with CORNELIO in my office in 3 weeks.      Yang Whitley MD  06/19/25  06:24 EDT

## 2025-06-19 NOTE — PLAN OF CARE
Problem: Adult Inpatient Plan of Care  Goal: Plan of Care Review  Outcome: Progressing  Goal: Patient-Specific Goal (Individualized)  Outcome: Progressing  Goal: Absence of Hospital-Acquired Illness or Injury  Outcome: Progressing  Intervention: Identify and Manage Fall Risk  Recent Flowsheet Documentation  Taken 6/19/2025 0400 by Viki Guadarrama RN  Safety Promotion/Fall Prevention:   safety round/check completed   nonskid shoes/slippers when out of bed   lighting adjusted   activity supervised  Taken 6/19/2025 0200 by Viki Guadarrama RN  Safety Promotion/Fall Prevention:   activity supervised   safety round/check completed   nonskid shoes/slippers when out of bed   lighting adjusted  Taken 6/19/2025 0000 by Viki Guadarrama RN  Safety Promotion/Fall Prevention:   activity supervised   safety round/check completed   nonskid shoes/slippers when out of bed   lighting adjusted  Taken 6/18/2025 2200 by Viki Guadarrama RN  Safety Promotion/Fall Prevention:   activity supervised   safety round/check completed   nonskid shoes/slippers when out of bed   lighting adjusted  Taken 6/18/2025 2021 by Viki Guadarrama RN  Safety Promotion/Fall Prevention:   activity supervised   safety round/check completed   nonskid shoes/slippers when out of bed   lighting adjusted  Intervention: Prevent Skin Injury  Recent Flowsheet Documentation  Taken 6/19/2025 0400 by Viki Guadarrama RN  Body Position: position changed independently  Skin Protection:   protective footwear used   pulse oximeter probe site changed  Taken 6/19/2025 0200 by Viki Guadarrama RN  Body Position: position changed independently  Skin Protection: pulse oximeter probe site changed  Taken 6/19/2025 0000 by Viki Guadarrama RN  Body Position: position changed independently  Skin Protection: transparent dressing maintained  Taken 6/18/2025 2200 by Viki Guadarrama RN  Body Position: position changed independently  Skin Protection: transparent dressing  maintained  Taken 6/18/2025 2021 by Viki Guadarrama RN  Body Position: position changed independently  Skin Protection:   drying agents applied   pulse oximeter probe site changed  Intervention: Prevent and Manage VTE (Venous Thromboembolism) Risk  Recent Flowsheet Documentation  Taken 6/18/2025 2021 by Viki Guadarrama RN  VTE Prevention/Management:   bilateral   SCDs (sequential compression devices) off  Intervention: Prevent Infection  Recent Flowsheet Documentation  Taken 6/19/2025 0400 by Viki Guadarrama RN  Infection Prevention:   hand hygiene promoted   personal protective equipment utilized  Taken 6/19/2025 0200 by Viki Guadarrama RN  Infection Prevention:   hand hygiene promoted   rest/sleep promoted  Taken 6/19/2025 0000 by Viki Guadarrama RN  Infection Prevention:   hand hygiene promoted   rest/sleep promoted   single patient room provided  Taken 6/18/2025 2200 by Viki Guadarrama RN  Infection Prevention:   hand hygiene promoted   single patient room provided  Taken 6/18/2025 2021 by Viki Guadarrama RN  Infection Prevention:   personal protective equipment utilized   hand hygiene promoted  Goal: Optimal Comfort and Wellbeing  Outcome: Progressing  Intervention: Provide Person-Centered Care  Recent Flowsheet Documentation  Taken 6/19/2025 0200 by Viki Guadarrama RN  Trust Relationship/Rapport:   care explained   questions answered   questions encouraged  Taken 6/18/2025 2200 by Viki Guadarrama RN  Trust Relationship/Rapport:   care explained   questions answered   reassurance provided   questions encouraged  Taken 6/18/2025 2021 by Viki Guadarrama RN  Trust Relationship/Rapport:   care explained   questions answered   questions encouraged  Goal: Readiness for Transition of Care  Outcome: Progressing   Goal Outcome Evaluation:

## 2025-06-23 ENCOUNTER — APPOINTMENT (OUTPATIENT)
Dept: CT IMAGING | Facility: HOSPITAL | Age: 65
End: 2025-06-23
Payer: MEDICARE

## 2025-06-23 ENCOUNTER — APPOINTMENT (OUTPATIENT)
Dept: GENERAL RADIOLOGY | Facility: HOSPITAL | Age: 65
End: 2025-06-23
Payer: MEDICARE

## 2025-06-23 ENCOUNTER — HOSPITAL ENCOUNTER (OUTPATIENT)
Facility: HOSPITAL | Age: 65
Setting detail: OBSERVATION
Discharge: HOME | End: 2025-06-25
Attending: STUDENT IN AN ORGANIZED HEALTH CARE EDUCATION/TRAINING PROGRAM | Admitting: INTERNAL MEDICINE
Payer: MEDICARE

## 2025-06-23 ENCOUNTER — APPOINTMENT (OUTPATIENT)
Dept: MRI IMAGING | Facility: HOSPITAL | Age: 65
End: 2025-06-23
Payer: MEDICARE

## 2025-06-23 ENCOUNTER — NURSE TRIAGE (OUTPATIENT)
Dept: CALL CENTER | Facility: HOSPITAL | Age: 65
End: 2025-06-23
Payer: MEDICARE

## 2025-06-23 DIAGNOSIS — K59.00 CONSTIPATION, UNSPECIFIED CONSTIPATION TYPE: ICD-10-CM

## 2025-06-23 DIAGNOSIS — Z79.899 POLYPHARMACY: ICD-10-CM

## 2025-06-23 DIAGNOSIS — G89.18 POSTOPERATIVE PAIN: ICD-10-CM

## 2025-06-23 DIAGNOSIS — G93.40 ENCEPHALOPATHY ACUTE: Primary | ICD-10-CM

## 2025-06-23 PROBLEM — M54.50 RECURRENT LOW BACK PAIN: Status: ACTIVE | Noted: 2025-06-23

## 2025-06-23 LAB
ALBUMIN SERPL-MCNC: 4 G/DL (ref 3.5–5.2)
ALBUMIN/GLOB SERPL: 0.9 G/DL
ALP SERPL-CCNC: 99 U/L (ref 39–117)
ALT SERPL W P-5'-P-CCNC: 27 U/L (ref 1–41)
AMPHET+METHAMPHET UR QL: NEGATIVE
AMPHETAMINES UR QL: NEGATIVE
ANION GAP SERPL CALCULATED.3IONS-SCNC: 13 MMOL/L (ref 5–15)
APAP SERPL-MCNC: 9.3 MCG/ML (ref 0–30)
AST SERPL-CCNC: 33 U/L (ref 1–40)
BARBITURATES UR QL SCN: NEGATIVE
BASOPHILS # BLD AUTO: 0.03 10*3/MM3 (ref 0–0.2)
BASOPHILS NFR BLD AUTO: 0.3 % (ref 0–1.5)
BENZODIAZ UR QL SCN: NEGATIVE
BILIRUB SERPL-MCNC: 0.5 MG/DL (ref 0–1.2)
BILIRUB UR QL STRIP: NEGATIVE
BUN SERPL-MCNC: 16.8 MG/DL (ref 8–23)
BUN/CREAT SERPL: 13.3 (ref 7–25)
BUPRENORPHINE SERPL-MCNC: NEGATIVE NG/ML
CALCIUM SPEC-SCNC: 9.7 MG/DL (ref 8.6–10.5)
CANNABINOIDS SERPL QL: POSITIVE
CHLORIDE SERPL-SCNC: 94 MMOL/L (ref 98–107)
CLARITY UR: CLEAR
CO2 SERPL-SCNC: 28 MMOL/L (ref 22–29)
COCAINE UR QL: NEGATIVE
COLOR UR: YELLOW
CREAT SERPL-MCNC: 1.26 MG/DL (ref 0.76–1.27)
CRP SERPL-MCNC: 17.09 MG/DL (ref 0–0.5)
D-LACTATE SERPL-SCNC: 1.5 MMOL/L (ref 0.5–2)
DEPRECATED RDW RBC AUTO: 42.5 FL (ref 37–54)
EGFRCR SERPLBLD CKD-EPI 2021: 62.5 ML/MIN/1.73
EOSINOPHIL # BLD AUTO: 0.08 10*3/MM3 (ref 0–0.4)
EOSINOPHIL NFR BLD AUTO: 0.7 % (ref 0.3–6.2)
ERYTHROCYTE [DISTWIDTH] IN BLOOD BY AUTOMATED COUNT: 13.8 % (ref 12.3–15.4)
ETHANOL BLD-MCNC: <10 MG/DL (ref 0–10)
FENTANYL UR-MCNC: NEGATIVE NG/ML
FLUAV RNA RESP QL NAA+PROBE: NOT DETECTED
FLUBV RNA RESP QL NAA+PROBE: NOT DETECTED
GLOBULIN UR ELPH-MCNC: 4.3 GM/DL
GLUCOSE SERPL-MCNC: 108 MG/DL (ref 65–99)
GLUCOSE UR STRIP-MCNC: NEGATIVE MG/DL
HCT VFR BLD AUTO: 41.4 % (ref 37.5–51)
HGB BLD-MCNC: 13.3 G/DL (ref 13–17.7)
HGB UR QL STRIP.AUTO: NEGATIVE
HOLD SPECIMEN: NORMAL
IMM GRANULOCYTES # BLD AUTO: 0.04 10*3/MM3 (ref 0–0.05)
IMM GRANULOCYTES NFR BLD AUTO: 0.3 % (ref 0–0.5)
KETONES UR QL STRIP: ABNORMAL
LEUKOCYTE ESTERASE UR QL STRIP.AUTO: NEGATIVE
LYMPHOCYTES # BLD AUTO: 2.23 10*3/MM3 (ref 0.7–3.1)
LYMPHOCYTES NFR BLD AUTO: 19.5 % (ref 19.6–45.3)
MAGNESIUM SERPL-MCNC: 2 MG/DL (ref 1.6–2.4)
MCH RBC QN AUTO: 27 PG (ref 26.6–33)
MCHC RBC AUTO-ENTMCNC: 32.1 G/DL (ref 31.5–35.7)
MCV RBC AUTO: 84 FL (ref 79–97)
METHADONE UR QL SCN: NEGATIVE
MONOCYTES # BLD AUTO: 0.87 10*3/MM3 (ref 0.1–0.9)
MONOCYTES NFR BLD AUTO: 7.6 % (ref 5–12)
NEUTROPHILS NFR BLD AUTO: 71.6 % (ref 42.7–76)
NEUTROPHILS NFR BLD AUTO: 8.2 10*3/MM3 (ref 1.7–7)
NITRITE UR QL STRIP: NEGATIVE
NRBC BLD AUTO-RTO: 0 /100 WBC (ref 0–0.2)
OPIATES UR QL: NEGATIVE
OXYCODONE UR QL SCN: POSITIVE
PCP UR QL SCN: NEGATIVE
PH UR STRIP.AUTO: 6 [PH] (ref 5–8)
PHOSPHATE SERPL-MCNC: 3.7 MG/DL (ref 2.5–4.5)
PLATELET # BLD AUTO: 307 10*3/MM3 (ref 140–450)
PMV BLD AUTO: 9.5 FL (ref 6–12)
POTASSIUM SERPL-SCNC: 4.2 MMOL/L (ref 3.5–5.2)
PROCALCITONIN SERPL-MCNC: 0.11 NG/ML (ref 0–0.25)
PROT SERPL-MCNC: 8.3 G/DL (ref 6–8.5)
PROT UR QL STRIP: ABNORMAL
RBC # BLD AUTO: 4.93 10*6/MM3 (ref 4.14–5.8)
RSV RNA RESP QL NAA+PROBE: NOT DETECTED
SALICYLATES SERPL-MCNC: <0.3 MG/DL
SARS-COV-2 RNA RESP QL NAA+PROBE: NOT DETECTED
SODIUM SERPL-SCNC: 135 MMOL/L (ref 136–145)
SP GR UR STRIP: 1.02 (ref 1–1.03)
TRICYCLICS UR QL SCN: POSITIVE
TSH SERPL DL<=0.05 MIU/L-ACNC: 1.17 UIU/ML (ref 0.27–4.2)
UROBILINOGEN UR QL STRIP: ABNORMAL
WBC NRBC COR # BLD AUTO: 11.45 10*3/MM3 (ref 3.4–10.8)
WHOLE BLOOD HOLD COAG: NORMAL
WHOLE BLOOD HOLD SPECIMEN: NORMAL

## 2025-06-23 PROCEDURE — 83735 ASSAY OF MAGNESIUM: CPT | Performed by: STUDENT IN AN ORGANIZED HEALTH CARE EDUCATION/TRAINING PROGRAM

## 2025-06-23 PROCEDURE — 70450 CT HEAD/BRAIN W/O DYE: CPT

## 2025-06-23 PROCEDURE — 87637 SARSCOV2&INF A&B&RSV AMP PRB: CPT | Performed by: STUDENT IN AN ORGANIZED HEALTH CARE EDUCATION/TRAINING PROGRAM

## 2025-06-23 PROCEDURE — 80307 DRUG TEST PRSMV CHEM ANLYZR: CPT | Performed by: STUDENT IN AN ORGANIZED HEALTH CARE EDUCATION/TRAINING PROGRAM

## 2025-06-23 PROCEDURE — 83605 ASSAY OF LACTIC ACID: CPT | Performed by: STUDENT IN AN ORGANIZED HEALTH CARE EDUCATION/TRAINING PROGRAM

## 2025-06-23 PROCEDURE — 36415 COLL VENOUS BLD VENIPUNCTURE: CPT

## 2025-06-23 PROCEDURE — A9577 INJ MULTIHANCE: HCPCS | Performed by: STUDENT IN AN ORGANIZED HEALTH CARE EDUCATION/TRAINING PROGRAM

## 2025-06-23 PROCEDURE — 85025 COMPLETE CBC W/AUTO DIFF WBC: CPT | Performed by: STUDENT IN AN ORGANIZED HEALTH CARE EDUCATION/TRAINING PROGRAM

## 2025-06-23 PROCEDURE — 84145 PROCALCITONIN (PCT): CPT | Performed by: STUDENT IN AN ORGANIZED HEALTH CARE EDUCATION/TRAINING PROGRAM

## 2025-06-23 PROCEDURE — 99024 POSTOP FOLLOW-UP VISIT: CPT | Performed by: PHYSICIAN ASSISTANT

## 2025-06-23 PROCEDURE — 86140 C-REACTIVE PROTEIN: CPT | Performed by: STUDENT IN AN ORGANIZED HEALTH CARE EDUCATION/TRAINING PROGRAM

## 2025-06-23 PROCEDURE — 84443 ASSAY THYROID STIM HORMONE: CPT | Performed by: STUDENT IN AN ORGANIZED HEALTH CARE EDUCATION/TRAINING PROGRAM

## 2025-06-23 PROCEDURE — 25510000002 GADOBENATE DIMEGLUMINE 529 MG/ML SOLUTION: Performed by: STUDENT IN AN ORGANIZED HEALTH CARE EDUCATION/TRAINING PROGRAM

## 2025-06-23 PROCEDURE — 25810000003 SODIUM CHLORIDE 0.9 % SOLUTION: Performed by: STUDENT IN AN ORGANIZED HEALTH CARE EDUCATION/TRAINING PROGRAM

## 2025-06-23 PROCEDURE — 82077 ASSAY SPEC XCP UR&BREATH IA: CPT | Performed by: STUDENT IN AN ORGANIZED HEALTH CARE EDUCATION/TRAINING PROGRAM

## 2025-06-23 PROCEDURE — 71045 X-RAY EXAM CHEST 1 VIEW: CPT

## 2025-06-23 PROCEDURE — 72100 X-RAY EXAM L-S SPINE 2/3 VWS: CPT

## 2025-06-23 PROCEDURE — 99285 EMERGENCY DEPT VISIT HI MDM: CPT

## 2025-06-23 PROCEDURE — 80179 DRUG ASSAY SALICYLATE: CPT | Performed by: STUDENT IN AN ORGANIZED HEALTH CARE EDUCATION/TRAINING PROGRAM

## 2025-06-23 PROCEDURE — 84100 ASSAY OF PHOSPHORUS: CPT | Performed by: STUDENT IN AN ORGANIZED HEALTH CARE EDUCATION/TRAINING PROGRAM

## 2025-06-23 PROCEDURE — G0378 HOSPITAL OBSERVATION PER HR: HCPCS

## 2025-06-23 PROCEDURE — 96374 THER/PROPH/DIAG INJ IV PUSH: CPT

## 2025-06-23 PROCEDURE — 74018 RADEX ABDOMEN 1 VIEW: CPT

## 2025-06-23 PROCEDURE — 80053 COMPREHEN METABOLIC PANEL: CPT | Performed by: STUDENT IN AN ORGANIZED HEALTH CARE EDUCATION/TRAINING PROGRAM

## 2025-06-23 PROCEDURE — 96361 HYDRATE IV INFUSION ADD-ON: CPT

## 2025-06-23 PROCEDURE — 25010000002 KETOROLAC TROMETHAMINE PER 15 MG: Performed by: STUDENT IN AN ORGANIZED HEALTH CARE EDUCATION/TRAINING PROGRAM

## 2025-06-23 PROCEDURE — 72158 MRI LUMBAR SPINE W/O & W/DYE: CPT

## 2025-06-23 PROCEDURE — 99223 1ST HOSP IP/OBS HIGH 75: CPT | Performed by: INTERNAL MEDICINE

## 2025-06-23 PROCEDURE — 87040 BLOOD CULTURE FOR BACTERIA: CPT | Performed by: STUDENT IN AN ORGANIZED HEALTH CARE EDUCATION/TRAINING PROGRAM

## 2025-06-23 PROCEDURE — 80143 DRUG ASSAY ACETAMINOPHEN: CPT | Performed by: STUDENT IN AN ORGANIZED HEALTH CARE EDUCATION/TRAINING PROGRAM

## 2025-06-23 PROCEDURE — 81003 URINALYSIS AUTO W/O SCOPE: CPT | Performed by: STUDENT IN AN ORGANIZED HEALTH CARE EDUCATION/TRAINING PROGRAM

## 2025-06-23 PROCEDURE — 93005 ELECTROCARDIOGRAM TRACING: CPT | Performed by: STUDENT IN AN ORGANIZED HEALTH CARE EDUCATION/TRAINING PROGRAM

## 2025-06-23 RX ORDER — SODIUM CHLORIDE 0.9 % (FLUSH) 0.9 %
10 SYRINGE (ML) INJECTION EVERY 12 HOURS SCHEDULED
Status: DISCONTINUED | OUTPATIENT
Start: 2025-06-23 | End: 2025-06-25 | Stop reason: HOSPADM

## 2025-06-23 RX ORDER — ACETAMINOPHEN 325 MG/1
650 TABLET ORAL EVERY 4 HOURS PRN
Status: DISCONTINUED | OUTPATIENT
Start: 2025-06-23 | End: 2025-06-25 | Stop reason: HOSPADM

## 2025-06-23 RX ORDER — AMOXICILLIN 250 MG
2 CAPSULE ORAL 2 TIMES DAILY PRN
Status: DISCONTINUED | OUTPATIENT
Start: 2025-06-23 | End: 2025-06-25 | Stop reason: HOSPADM

## 2025-06-23 RX ORDER — HYDROCODONE BITARTRATE AND ACETAMINOPHEN 5; 325 MG/1; MG/1
1 TABLET ORAL EVERY 6 HOURS PRN
Refills: 0 | Status: DISCONTINUED | OUTPATIENT
Start: 2025-06-23 | End: 2025-06-25 | Stop reason: HOSPADM

## 2025-06-23 RX ORDER — SODIUM CHLORIDE 9 MG/ML
40 INJECTION, SOLUTION INTRAVENOUS AS NEEDED
Status: DISCONTINUED | OUTPATIENT
Start: 2025-06-23 | End: 2025-06-25 | Stop reason: HOSPADM

## 2025-06-23 RX ORDER — NITROGLYCERIN 0.4 MG/1
0.4 TABLET SUBLINGUAL
Status: DISCONTINUED | OUTPATIENT
Start: 2025-06-23 | End: 2025-06-25 | Stop reason: HOSPADM

## 2025-06-23 RX ORDER — ACETAMINOPHEN 160 MG/5ML
650 SOLUTION ORAL EVERY 4 HOURS PRN
Status: DISCONTINUED | OUTPATIENT
Start: 2025-06-23 | End: 2025-06-25 | Stop reason: HOSPADM

## 2025-06-23 RX ORDER — BISACODYL 5 MG/1
5 TABLET, DELAYED RELEASE ORAL DAILY PRN
Status: DISCONTINUED | OUTPATIENT
Start: 2025-06-23 | End: 2025-06-25 | Stop reason: HOSPADM

## 2025-06-23 RX ORDER — NALOXONE HCL 0.4 MG/ML
0.4 VIAL (ML) INJECTION
Status: DISCONTINUED | OUTPATIENT
Start: 2025-06-23 | End: 2025-06-23

## 2025-06-23 RX ORDER — KETOROLAC TROMETHAMINE 15 MG/ML
15 INJECTION, SOLUTION INTRAMUSCULAR; INTRAVENOUS ONCE
Status: COMPLETED | OUTPATIENT
Start: 2025-06-23 | End: 2025-06-23

## 2025-06-23 RX ORDER — ACETAMINOPHEN 500 MG
1000 TABLET ORAL ONCE
Status: DISCONTINUED | OUTPATIENT
Start: 2025-06-23 | End: 2025-06-25 | Stop reason: HOSPADM

## 2025-06-23 RX ORDER — ONDANSETRON 2 MG/ML
4 INJECTION INTRAMUSCULAR; INTRAVENOUS EVERY 6 HOURS PRN
Status: DISCONTINUED | OUTPATIENT
Start: 2025-06-23 | End: 2025-06-25 | Stop reason: HOSPADM

## 2025-06-23 RX ORDER — BISACODYL 10 MG
10 SUPPOSITORY, RECTAL RECTAL DAILY PRN
Status: DISCONTINUED | OUTPATIENT
Start: 2025-06-23 | End: 2025-06-25 | Stop reason: HOSPADM

## 2025-06-23 RX ORDER — POLYETHYLENE GLYCOL 3350 17 G/17G
17 POWDER, FOR SOLUTION ORAL DAILY PRN
Status: DISCONTINUED | OUTPATIENT
Start: 2025-06-23 | End: 2025-06-25 | Stop reason: HOSPADM

## 2025-06-23 RX ORDER — ACETAMINOPHEN 650 MG/1
650 SUPPOSITORY RECTAL EVERY 4 HOURS PRN
Status: DISCONTINUED | OUTPATIENT
Start: 2025-06-23 | End: 2025-06-25 | Stop reason: HOSPADM

## 2025-06-23 RX ORDER — HYDROMORPHONE HYDROCHLORIDE 1 MG/ML
0.5 INJECTION, SOLUTION INTRAMUSCULAR; INTRAVENOUS; SUBCUTANEOUS
Refills: 0 | Status: DISCONTINUED | OUTPATIENT
Start: 2025-06-23 | End: 2025-06-23

## 2025-06-23 RX ORDER — MORPHINE SULFATE 2 MG/ML
2 INJECTION, SOLUTION INTRAMUSCULAR; INTRAVENOUS EVERY 4 HOURS PRN
Status: DISCONTINUED | OUTPATIENT
Start: 2025-06-23 | End: 2025-06-25 | Stop reason: HOSPADM

## 2025-06-23 RX ORDER — SODIUM CHLORIDE 0.9 % (FLUSH) 0.9 %
10 SYRINGE (ML) INJECTION AS NEEDED
Status: DISCONTINUED | OUTPATIENT
Start: 2025-06-23 | End: 2025-06-25 | Stop reason: HOSPADM

## 2025-06-23 RX ORDER — SODIUM CHLORIDE 9 MG/ML
100 INJECTION, SOLUTION INTRAVENOUS CONTINUOUS
Status: ACTIVE | OUTPATIENT
Start: 2025-06-23 | End: 2025-06-25

## 2025-06-23 RX ADMIN — SODIUM CHLORIDE 1000 ML: 9 INJECTION, SOLUTION INTRAVENOUS at 16:15

## 2025-06-23 RX ADMIN — KETOROLAC TROMETHAMINE 15 MG: 15 INJECTION, SOLUTION INTRAMUSCULAR; INTRAVENOUS at 16:17

## 2025-06-23 RX ADMIN — GADOBENATE DIMEGLUMINE 20 ML: 529 INJECTION, SOLUTION INTRAVENOUS at 17:39

## 2025-06-23 NOTE — ED PROVIDER NOTES
EMERGENCY DEPARTMENT ENCOUNTER    Pt Name: Bonilla Sterling Jr.  MRN: [id number]  Pt :   1960  Room Number:  10/10  Date of encounter:  2025  PCP: Christin Yeh APRN  ED Provider: Saravanan Elliott MD    Historian: Patient, spouse      HPI:  Chief Complaint: Back pain, confusion, difficulty walking        Context: Bonilla Sterling Jr. is a  65-year-old man recent lumbar fusion on  who presents because of multiple complaints.  Since the surgery he was initially doing well but since Saturday has not been eating or drinking has not been sleeping well and has become more more confused he is having intermittent visual illusions thinking he is saying something that is not there.  They deny fevers and he denies headache.  No cough diarrhea or dysuria he has had significantly worsening back pain going to both hips and is having difficulty walking because of the pain.  No other complaints at this time.      PAST MEDICAL HISTORY  Past Medical History:   Diagnosis Date    Acid reflux     Anxiety     Arthritis     OSTEO    Cervical disc disorder     Bulging disc    Chronic pain disorder     Back     CTS (carpal tunnel syndrome)     Elevated cholesterol     Gout     HL (hearing loss)     Hypertension     Low back pain     Lumbosacral disc disease     Bulging disc    Memory loss     Have an appointment to confirm    Peripheral neuropathy     Sinus headache     Sleep apnea     DOES NOT USE CPAP    Thoracic disc disorder     Bulging disc    Vision loss     Wears glasses     Wears partial dentures     Full upper plate - advised no adhesives DOS         PAST SURGICAL HISTORY  Past Surgical History:   Procedure Laterality Date    CARPAL TUNNEL RELEASE Bilateral     COLONOSCOPY  2009    COLONOSCOPY N/A 2020    Procedure: COLONOSCOPY;  Surgeon: Chantale Naik MD;  Location: [facility] ENDOSCOPY;  Service: Gastroenterology;  Laterality: N/A;    CYST REMOVAL      FOREHEAD,  SEBACEOUS CYST    LUMBAR LAMINECTOMY WITH FUSION N/A 2025    Procedure: LUMBAR FUSION DECOMPRESSION WITH PEDICLE SCREWS L4-5;  Surgeon: Yang Whitley MD;  Location: [facility];  Service: Neurosurgery;  Laterality: N/A;    MOUTH SURGERY      Oral extractions    ORIF WRIST FRACTURE Right          FAMILY HISTORY  Family History   Problem Relation Age of Onset    Diabetes Mother     Hypertension Mother     Breast cancer Mother     Dementia Mother     Alzheimer's disease Mother     Arthritis Mother         Knee replacement    Hyperlipidemia Mother     Diabetes Father     Hypertension Father     Dementia Father     Alzheimer's disease Father     No Known Problems Sister     No Known Problems Sister     No Known Problems Sister     No Known Problems Sister     No Known Problems Sister     No Known Problems Brother     No Known Problems Brother     No Known Problems Brother     Colon cancer Neg Hx          SOCIAL HISTORY  Social History     Socioeconomic History    Marital status:    Tobacco Use    Smoking status: Former     Current packs/day: 0.00     Average packs/day: 1 pack/day for 20.0 years (20.0 ttl pk-yrs)     Types: Cigarettes     Start date: 1989     Quit date:      Years since quittin.4     Passive exposure: Past    Smokeless tobacco: Never   Vaping Use    Vaping status: Never Used   Substance and Sexual Activity    Alcohol use: Not Currently     Comment: quit 10 years ago     Drug use: No    Sexual activity: Yes     Partners: Female         ALLERGIES  Patient has no known allergies.        REVIEW OF SYSTEMS  Review of Systems       All systems reviewed and negative except for those discussed in HPI.       PHYSICAL EXAM    I have reviewed the triage vital signs and nursing notes.    ED Triage Vitals [25 1234]   Temp Heart Rate Resp BP SpO2   98.2 °F (36.8 °C) 108 16 131/82 97 %      Temp src Heart Rate Source Patient Position BP Location FiO2 (%)   Oral Monitor Sitting Left arm  --       Physical Exam  GENERAL:   Appears awake and alert  HENT: Nares patent.  Dry mucous membrane, not meningitic easily touches chin to chest  EYES: No scleral icterus.  Pupils equal and reactive  CV: Regular rhythm, regular rate.  RESPIRATORY: Normal effort.  No audible wheezes, rales or rhonchi.  ABDOMEN: Soft, nontender  MUSCULOSKELETAL: No deformities.  Mild tenderness along the lumbar spine that would be expected postoperatively  NEURO: Alert but intermittently confused in conversation and encephalopathic, moves all extremities strength is slightly diminished in the left lower extremity when compared with the right, follows commands.  SKIN: Warm, dry, no rash visualized.      LAB RESULTS  Recent Results (from the past 24 hours)   Green Top (Gel)    Collection Time: 06/23/25  3:02 PM   Result Value Ref Range    Extra Tube Hold for add-ons.    Lavender Top    Collection Time: 06/23/25  3:02 PM   Result Value Ref Range    Extra Tube hold for add-on    Gold Top - SST    Collection Time: 06/23/25  3:02 PM   Result Value Ref Range    Extra Tube Hold for add-ons.    Gray Top    Collection Time: 06/23/25  3:02 PM   Result Value Ref Range    Extra Tube Hold for add-ons.    Light Blue Top    Collection Time: 06/23/25  3:02 PM   Result Value Ref Range    Extra Tube Hold for add-ons.    Comprehensive Metabolic Panel    Collection Time: 06/23/25  3:02 PM    Specimen: Blood   Result Value Ref Range    Glucose 108 (H) 65 - 99 mg/dL    BUN 16.8 8.0 - 23.0 mg/dL    Creatinine 1.26 0.76 - 1.27 mg/dL    Sodium 135 (L) 136 - 145 mmol/L    Potassium 4.2 3.5 - 5.2 mmol/L    Chloride 94 (L) 98 - 107 mmol/L    CO2 28.0 22.0 - 29.0 mmol/L    Calcium 9.7 8.6 - 10.5 mg/dL    Total Protein 8.3 6.0 - 8.5 g/dL    Albumin 4.0 3.5 - 5.2 g/dL    ALT (SGPT) 27 1 - 41 U/L    AST (SGOT) 33 1 - 40 U/L    Alkaline Phosphatase 99 39 - 117 U/L    Total Bilirubin 0.5 0.0 - 1.2 mg/dL    Globulin 4.3 gm/dL    A/G Ratio 0.9 g/dL    BUN/Creatinine  Ratio 13.3 7.0 - 25.0    Anion Gap 13.0 5.0 - 15.0 mmol/L    eGFR 62.5 >60.0 mL/min/1.73   Lactic Acid, Plasma    Collection Time: 06/23/25  3:02 PM    Specimen: Blood   Result Value Ref Range    Lactate 1.5 0.5 - 2.0 mmol/L   Procalcitonin    Collection Time: 06/23/25  3:02 PM    Specimen: Blood   Result Value Ref Range    Procalcitonin 0.11 0.00 - 0.25 ng/mL   C-reactive Protein    Collection Time: 06/23/25  3:02 PM    Specimen: Blood   Result Value Ref Range    C-Reactive Protein 17.09 (H) 0.00 - 0.50 mg/dL   Salicylate Level    Collection Time: 06/23/25  3:02 PM    Specimen: Blood   Result Value Ref Range    Salicylate <0.3 <=30.0 mg/dL   Ethanol    Collection Time: 06/23/25  3:02 PM    Specimen: Blood   Result Value Ref Range    Ethanol <10 0 - 10 mg/dL   Acetaminophen Level    Collection Time: 06/23/25  3:02 PM    Specimen: Blood   Result Value Ref Range    Acetaminophen 9.3 0.0 - 30.0 mcg/mL   Magnesium    Collection Time: 06/23/25  3:02 PM    Specimen: Blood   Result Value Ref Range    Magnesium 2.0 1.6 - 2.4 mg/dL   Phosphorus    Collection Time: 06/23/25  3:02 PM    Specimen: Blood   Result Value Ref Range    Phosphorus 3.7 2.5 - 4.5 mg/dL   TSH Rfx On Abnormal To Free T4    Collection Time: 06/23/25  3:02 PM    Specimen: Blood   Result Value Ref Range    TSH 1.170 0.270 - 4.200 uIU/mL   CBC Auto Differential    Collection Time: 06/23/25  3:02 PM    Specimen: Blood   Result Value Ref Range    WBC 11.45 (H) 3.40 - 10.80 10*3/mm3    RBC 4.93 4.14 - 5.80 10*6/mm3    Hemoglobin 13.3 13.0 - 17.7 g/dL    Hematocrit 41.4 37.5 - 51.0 %    MCV 84.0 79.0 - 97.0 fL    MCH 27.0 26.6 - 33.0 pg    MCHC 32.1 31.5 - 35.7 g/dL    RDW 13.8 12.3 - 15.4 %    RDW-SD 42.5 37.0 - 54.0 fl    MPV 9.5 6.0 - 12.0 fL    Platelets 307 140 - 450 10*3/mm3    Neutrophil % 71.6 42.7 - 76.0 %    Lymphocyte % 19.5 (L) 19.6 - 45.3 %    Monocyte % 7.6 5.0 - 12.0 %    Eosinophil % 0.7 0.3 - 6.2 %    Basophil % 0.3 0.0 - 1.5 %    Immature Grans  % 0.3 0.0 - 0.5 %    Neutrophils, Absolute 8.20 (H) 1.70 - 7.00 10*3/mm3    Lymphocytes, Absolute 2.23 0.70 - 3.10 10*3/mm3    Monocytes, Absolute 0.87 0.10 - 0.90 10*3/mm3    Eosinophils, Absolute 0.08 0.00 - 0.40 10*3/mm3    Basophils, Absolute 0.03 0.00 - 0.20 10*3/mm3    Immature Grans, Absolute 0.04 0.00 - 0.05 10*3/mm3    nRBC 0.0 0.0 - 0.2 /100 WBC   Urinalysis With Microscopic If Indicated (No Culture) - Urine, Clean Catch    Collection Time: 06/23/25  3:17 PM    Specimen: Urine, Clean Catch   Result Value Ref Range    Color, UA Yellow Yellow, Straw    Appearance, UA Clear Clear    pH, UA 6.0 5.0 - 8.0    Specific Gravity, UA 1.016 1.001 - 1.030    Glucose, UA Negative Negative    Ketones, UA Trace (A) Negative    Bilirubin, UA Negative Negative    Blood, UA Negative Negative    Protein, UA Trace (A) Negative    Leuk Esterase, UA Negative Negative    Nitrite, UA Negative Negative    Urobilinogen, UA 0.2 E.U./dL 0.2 - 1.0 E.U./dL   Urine Drug Screen - Urine, Clean Catch    Collection Time: 06/23/25  3:17 PM    Specimen: Urine, Clean Catch   Result Value Ref Range    THC, Screen, Urine Positive (A) Negative    Phencyclidine (PCP), Urine Negative Negative    Cocaine Screen, Urine Negative Negative    Methamphetamine, Ur Negative Negative    Opiate Screen Negative Negative    Amphetamine Screen, Urine Negative Negative    Benzodiazepine Screen, Urine Negative Negative    Tricyclic Antidepressants Screen Positive (A) Negative    Methadone Screen, Urine Negative Negative    Barbiturates Screen, Urine Negative Negative    Oxycodone Screen, Urine Positive (A) Negative    Buprenorphine, Screen, Urine Negative Negative   COVID-19, FLU A/B, RSV PCR 1 HR TAT - Swab, Nasopharynx    Collection Time: 06/23/25  3:17 PM    Specimen: Nasopharynx; Swab   Result Value Ref Range    COVID19 Not Detected Not Detected - Ref. Range    Influenza A PCR Not Detected Not Detected    Influenza B PCR Not Detected Not Detected    RSV, PCR  Not Detected Not Detected   Fentanyl, Urine - Urine, Clean Catch    Collection Time: 06/23/25  3:17 PM    Specimen: Urine, Clean Catch   Result Value Ref Range    Fentanyl, Urine Negative Negative   ECG 12 Lead Electrolyte Imbalance    Collection Time: 06/23/25  3:23 PM   Result Value Ref Range    QT Interval 344 ms    QTC Interval 441 ms       If labs were ordered, I independently reviewed the results and considered them in treating the patient.        RADIOLOGY  XR Abdomen KUB  Result Date: 6/23/2025  XR ABDOMEN KUB, XR SPINE LUMBAR 2 OR 3 VW Date of Exam: 6/23/2025 7:28 PM EDT Indication: postoperative constipation Comparison: Lumbar spine MRI 6/23/2025 Findings: Moderate colonic stool. No dilated loops of bowel to suggest ileus or obstruction. No significant gastric distention. No appreciable renal calcifications. Mild to moderate degenerative osteoarthritis of the hip joints. L4-L5 spinal fusion and discectomy. No visualized hardware fracture or signs of loosening. No visualized displaced fracture or vertebral body height loss. Minimal grade 1 anterolisthesis L4 and L5 stable from prior. Mild lumbar dextrocurvature possibly positional. Residual degenerative disc disease and facet arthropathy better assessed on MRI performed same day. Abdominal aortic atherosclerotic disease. Degenerative osteoarthritis of the SI joints.     Impression: 1. Moderate formed colonic stool without obstruction. 2. Radiographically uncomplicated L4-L5 fusion hardware. Spondylotic change better assessed on same day lumbar spine MRI. Electronically Signed: Bethel Sanchez MD  6/23/2025 8:03 PM EDT  Workstation ID: [id number]    XR Spine Lumbar 2 or 3 View  Result Date: 6/23/2025  XR ABDOMEN KUB, XR SPINE LUMBAR 2 OR 3 VW Date of Exam: 6/23/2025 7:28 PM EDT Indication: postoperative constipation Comparison: Lumbar spine MRI 6/23/2025 Findings: Moderate colonic stool. No dilated loops of bowel to suggest ileus or obstruction. No significant  gastric distention. No appreciable renal calcifications. Mild to moderate degenerative osteoarthritis of the hip joints. L4-L5 spinal fusion and discectomy. No visualized hardware fracture or signs of loosening. No visualized displaced fracture or vertebral body height loss. Minimal grade 1 anterolisthesis L4 and L5 stable from prior. Mild lumbar dextrocurvature possibly positional. Residual degenerative disc disease and facet arthropathy better assessed on MRI performed same day. Abdominal aortic atherosclerotic disease. Degenerative osteoarthritis of the SI joints.     Impression: 1. Moderate formed colonic stool without obstruction. 2. Radiographically uncomplicated L4-L5 fusion hardware. Spondylotic change better assessed on same day lumbar spine MRI. Electronically Signed: Bethel Sanchez MD  6/23/2025 8:03 PM EDT  Workstation ID: [id number]    MRI Lumbar Spine With & Without Contrast  Result Date: 6/23/2025  MRI LUMBAR SPINE W WO CONTRAST Date of Exam: 6/23/2025 5:26 PM EDT Indication: Worsening back pain and weakness, encephalopathy following 6/16 fusion.  Comparison: Lumbar spine CT 4/15/2025. MR lumbar spine 3/6/2025. Technique:  Routine multiplanar/multisequence sequence images of the lumbar spine were obtained before and after the uneventful administration of 20 mL Multihance.  Findings: Study is degraded by motion artifact. Within technical limitation: Vertebral numbering: The last well formed disc is labeled L5-S1. Alignment: There is straightening of normal lumbar lordosis. Vertebrae: Posterior decompression with instrumented and interbody fusion at L4-5 with associated hardware artifacts which limits evaluation of adjacent structures, including adjacent vertebral bodies and spinal canal. Multilevel degenerative endplate changes, including Modic type II/fatty endplate degenerative change at L5-S1.. Vertebral body heights are otherwise maintained.No focal suspicious appearing marrow lesions or bone marrow  edema within limitation of hardware artifacts. Discs and spinal canal: Multilevel disc desiccation and disc height loss. Degenerative changes detailed below by level.. Spinal cord and cauda equina: The visible spinal cord has normal signal and morphology. No cauda equina compression. The conus tip lies at L2 level. Adjacent soft tissues: Posterior paraspinal soft tissue edema, without organized fluid collection seen, likely postoperative. After intravenous contrast, no convincing abnormal enhancement, noting hardware artifacts precludes accurate evaluation on postcontrast sequences from L3-4 through L4-5 level. Findings by level: T11-12: No significant interval change.. No significant posterior disc bulge.. Mild bilateral facet arthropathy. No significant spinal canal or foraminal stenosis. T12-L1: No significant interval change. No significant posterior disc bulge. Mild to moderate bilateral facet arthropathy and ligamentum flavum thickening. No significant spinal canal or foraminal stenosis. L1-2: No significant interval change. No significant posterior disc bulge. Moderate bilateral facet arthropathy with ligamentum flavum thickening. No significant spinal canal or foraminal stenosis. L2-3: No significant interval change. Mild diffuse disc bulge. Moderate bilateral facet arthropathy with ligamentum flavum thickening. No significant spinal canal or foraminal stenosis. L3-4: No significant interval change. Diffuse disc bulge with osteophytic ridging. Severe bilateral facet arthropathy with ligamentum flavum thickening. Mild spinal canal stenosis. Moderate bilateral foraminal narrowing. L4-5: Interval postoperative changes. Interval improved spinal canal stenosis. Spinal canal appears grossly patent. Bilateral facet arthropathy. Severe left greater than right foraminal narrowing. L5-S1: No significant interval change. Osteophytic ridging. Severe bilateral facet arthropathy.. No significant spinal canal stenosis.  Moderate to severe bilateral foraminal narrowing.     Impression: Study is degraded by motion artifact. Interval postoperative changes at L4-5 with improved spinal canal stenosis at this level. Posterior paraspinal soft tissue edema without organized collection, favored postoperative. Otherwise, no significant interval change in multilevel degenerative changes in the lumbar spine. Electronically Signed: Dimitris Narvaez MD  6/23/2025 6:08 PM EDT  Workstation ID: [id number]    CT Head Without Contrast  Result Date: 6/23/2025  CT HEAD WO CONTRAST Date of Exam: 6/23/2025 3:53 PM EDT Indication: altered mental status, suspect metabolic. Comparison: Head CT 4/20/2025. Brain MRI 9/11/2024. Technique: Axial CT images were obtained of the head without contrast administration.  Automated exposure control and iterative construction methods were used. Findings: No acute intracranial hemorrhage.Intact appearing gray-white differentiation.No extra-axial fluid collection.No significant mass effect. No hydrocephalus. Mild generalized parenchymal volume loss. Scattered areas of periventricular and subcortical white matter hypoattenuation, nonspecific, perhaps from small vessel ischemic/hypertensive changes in a patient of this age.There are intracranial atherosclerotic calcifications. Mild scattered mucosal thickening in the paranasal sinuses.Mastoid air cells are essentially clear.Included globes and orbits appear unremarkable by CT. No acute or aggressive appearing bony or extracranial soft tissue process.     Impression: No acute intracranial findings. Electronically Signed: Dimitris Narvaez MD  6/23/2025 4:04 PM EDT  Workstation ID: [id number]    XR Chest 1 View  Result Date: 6/23/2025  XR CHEST 1 VW Date of Exam: 6/23/2025 3:25 PM EDT Indication: Altered mental status, infectious work-up Comparison: None available. Findings: The cardiomediastinal silhouette is within normal limits. Pulmonary vascularity appears normal. There is no  acute airspace consolidation, pleural effusion, or pneumothorax. There are degenerative changes of the thoracic spine.     Impression: No acute cardiopulmonary abnormality. Electronically Signed: Keven Muir MD  6/23/2025 3:39 PM EDT  Workstation ID: [id number]      I ordered and independently reviewed the above noted radiographic studies.      I viewed images of head CT which showed acute bleeds masses infarctions or other abnormalities that I can appreciate.  Per my independent interpretation.    See radiologist's dictation for official interpretation.        PROCEDURES    Procedures    ECG 12 Lead Electrolyte Imbalance   Preliminary Result   Test Reason : Electrolyte Imbalance   Blood Pressure :   */*   mmHG   Vent. Rate :  99 BPM     Atrial Rate :  99 BPM      P-R Int : 196 ms          QRS Dur :  82 ms       QT Int : 344 ms       P-R-T Axes :  41  33   7 degrees     QTcB Int : 441 ms      Normal sinus rhythm   Normal ECG   When compared with ECG of 09-Jun-2025 14:50,   No significant change was found      Referred By:            Confirmed By:           MEDICATIONS GIVEN IN ER    Medications   acetaminophen (TYLENOL) tablet 1,000 mg (1,000 mg Oral Not Given 6/23/25 1524)   sodium chloride 0.9 % flush 10 mL (has no administration in time range)   sodium chloride 0.9 % flush 10 mL (has no administration in time range)   sodium chloride 0.9 % infusion 40 mL (has no administration in time range)   nitroglycerin (NITROSTAT) SL tablet 0.4 mg (has no administration in time range)   sodium chloride 0.9 % infusion (has no administration in time range)   Potassium Replacement - Follow Nurse / BPA Driven Protocol (has no administration in time range)   Magnesium Standard Dose Replacement - Follow Nurse / BPA Driven Protocol (has no administration in time range)   Phosphorus Replacement - Follow Nurse / BPA Driven Protocol (has no administration in time range)   Calcium Replacement - Follow Nurse / BPA Driven Protocol  (has no administration in time range)   acetaminophen (TYLENOL) tablet 650 mg (has no administration in time range)     Or   acetaminophen (TYLENOL) 160 MG/5ML oral solution 650 mg (has no administration in time range)     Or   acetaminophen (TYLENOL) suppository 650 mg (has no administration in time range)   HYDROcodone-acetaminophen (NORCO) 5-325 MG per tablet 1 tablet (has no administration in time range)   melatonin tablet 5 mg (has no administration in time range)   sennosides-docusate (PERICOLACE) 8.6-50 MG per tablet 2 tablet (has no administration in time range)     And   polyethylene glycol (MIRALAX) packet 17 g (has no administration in time range)     And   bisacodyl (DULCOLAX) EC tablet 5 mg (has no administration in time range)     And   bisacodyl (DULCOLAX) suppository 10 mg (has no administration in time range)   ondansetron (ZOFRAN) injection 4 mg (has no administration in time range)   morphine injection 2 mg (has no administration in time range)   sodium chloride 0.9 % bolus 1,000 mL (0 mL Intravenous Stopped 6/23/25 1950)   ketorolac (TORADOL) injection 15 mg (15 mg Intravenous Given 6/23/25 1617)   gadobenate dimeglumine (MULTIHANCE) injection 20 mL (20 mL Intravenous Given 6/23/25 1739)         MEDICAL DECISION MAKING, PROGRESS, and CONSULTS    All labs, if obtained, have been independently reviewed by me.  All radiology studies, if obtained, have been reviewed by me and the radiologist dictating the report.  All EKGs, if obtained, have been independently viewed and interpreted by me/my attending physician.      Discussion below represents my analysis of pertinent findings related to patient's condition, differential diagnosis, treatment plan and final disposition.                                          Differential diagnosis:    Meningitis, encephalitis, dehydration, fatigue, malnutrition, spinal infection, cauda equina, conus medullaris, UTI, sepsis, anemia, electrolyte  abnormality      Additional sources:    - Discussed/ obtained information from independent historians: Spouse    - External (non-ED) record review:  Chart review shows surgery note from 6/16 for lumbar fusion and decompression with Dr. Whitley    PCP note showing history of:  Benign essential HTN;   Chronic gout without tophus, unspecified cause, unspecified site;   Erectile dysfunction, unspecified erectile dysfunction type;   Right elbow pain;   Chronic left shoulder pain     - Chronic or social conditions impacting care: Chronic pain, hypertension, memory loss on donepezil        Orders placed during this visit:  Orders Placed This Encounter   Procedures    COVID PRE-OP / PRE-PROCEDURE SCREENING ORDER (NO ISOLATION) - Swab, Nasopharynx    Blood Culture - Blood,    Blood Culture - Blood,    COVID-19, FLU A/B, RSV PCR 1 HR TAT - Swab, Nasopharynx    XR Chest 1 View    MRI Lumbar Spine With & Without Contrast    CT Head Without Contrast    XR Abdomen KUB    XR Spine Lumbar 2 or 3 View    Calvin Draw    Comprehensive Metabolic Panel    Urinalysis With Microscopic If Indicated (No Culture) - Urine, Clean Catch    Lactic Acid, Plasma    Procalcitonin    C-reactive Protein    Salicylate Level    Urine Drug Screen - Urine, Clean Catch    Ethanol    Acetaminophen Level    Magnesium    Phosphorus    TSH Rfx On Abnormal To Free T4    CBC Auto Differential    Fentanyl, Urine - Urine, Clean Catch    CBC Auto Differential    Comprehensive Metabolic Panel    Magnesium    Diet: Cardiac, Diabetic; Healthy Heart (2-3 Na+); Consistent Carbohydrate; Fluid Consistency: Thin (IDDSI 0)    Post void bladder scan  Misc Nursing Order (Specify)    Vital Signs    Intake & Output    Weigh Patient    Oral Care    Place Sequential Compression Device    Maintain Sequential Compression Device    Maintain IV Access    Telemetry - Place Orders & Notify Provider of Results When Patient Experiences Acute Chest Pain, Dysrhythmia or Respiratory  Distress    May Be Off Telemetry for Tests    Up With Assistance    Code Status and Medical Interventions: CPR (Attempt to Resuscitate); Full Support    OT Consult: Eval & Treat Post-Surgery Care, ADL Performance Below Baseline    PT Consult: Eval & Treat Functional Mobility Below Baseline, Post Surgery Care    ECG 12 Lead Electrolyte Imbalance    Insert Peripheral IV    Initiate Observation Status    Green Top (Gel)    Lavender Top    Gold Top - SST    Gray Top    Light Blue Top    CBC & Differential         Additional orders considered but not ordered:      ED Course:    Consultants:      ED Course as of 06/23/25 2250   Mon Jun 23, 2025   8944 Chart review shows surgery note from 6/16 for lumbar fusion and decompression with Dr. Whitley    PCP note showing history of:  Benign essential HTN;   Chronic gout without tophus, unspecified cause, unspecified site;   Erectile dysfunction, unspecified erectile dysfunction type;   Right elbow pain;   Chronic left shoulder pain    [CC]   1414 Is a very nice 65-year-old man recent lumbar fusion on 6/16 who presents because of multiple complaints.  Since the surgery he was initially doing well but since Saturday has not been eating or drinking has not been sleeping well and has become more more confused he is having intermittent visual illusions thinking he is saying something that is not there.  They deny fevers and he denies headache.  No cough diarrhea or dysuria he has had significantly worsening back pain going to both hips and is having difficulty walking because of the pain.  No other complaints at this time. [CC]   1415 He arrived awake and alert he is somewhat encephalopathic in conversation endorsing severe low back pain but no tenderness over the surgical incision which appears clean.  He denies headache or fever obtaining full infectious and altered mental status workup giving IV fluids Tylenol ibuprofen Case discussed with Dr. Whitley neurosurgery who recommends MRI  of the lumbar spine with and without so getting chest x-ray and head CT.  Will reevaluate pending initial workup. [CC]   2248 Head CT not showing any acute pathology and  MRI of the spine seems to just be showing normal postoperative changes and inflammation.  Mild leukocytosis and metabolic panel reassuring nonactionable he does have elevated CRP  UDS positive for oxycodone and THC which could certainly be considered contributing to his confusion.  He has been evaluated by neurosurgery because of the encephalopathy they would like to admit to the hospital for symptomatic care and continued monitoring.  He complained to them about constipation we added on KUB which does show significant constipation so we will start bowel regimen hospitalist consulted for admission.   [CC]      ED Course User Index  [CC] Saravanan Elliott MD              Shared Decision Making:  After my consideration of clinical presentation and any laboratory/radiology studies obtained, I discussed the findings with the patient/patient representative who is in agreement with the treatment plan and the final disposition.   Risks and benefits of discharge and/or observation/admission were discussed.       AS OF 22:50 EDT VITALS:    BP - 122/89  HR - 105  TEMP - 98.2 °F (36.8 °C) (Oral)  O2 SATS - 97%                  DIAGNOSIS  Final diagnoses:   Encephalopathy acute   Postoperative pain   Polypharmacy   Constipation, unspecified constipation type         DISPOSITION  Admit      Please note that portions of this document were completed with voice recognition software.        Saravanan Elliott MD  06/23/25 [id number]

## 2025-06-23 NOTE — TELEPHONE ENCOUNTER
"Reason for Disposition   Patient sounds very sick or weak to the triager    Additional Information   Negative: Sounds like a life-threatening emergency to the triager   Negative: Chest pain   Negative: Difficulty breathing   Negative: Acting confused (e.g., disoriented, slurred speech) or excessively sleepy   Negative: Post-Op tonsil and adenoid surgery, symptoms or questions about   Negative: Surgical incision symptoms and questions   Negative: [1] Pain or burning with passing urine (urination) AND [2] male   Negative: [1] Pain or burning with passing urine (urination) AND [2] female   Negative: Constipation   Negative: New or worsening leg (calf, thigh) pain   Negative: New or worsening leg swelling   Negative: Dizziness is severe, or persists > 24 hours after surgery   Negative: Pain, redness, swelling, or pus at IV Site   Negative: Symptoms arising from use of a urinary catheter (e.g., Coude, Seymour)   Negative: Cast problems or questions   Negative: Medication question   Negative: [1] Widespread rash AND [2] bright red, sunburn-like   Negative: [1] SEVERE headache AND [2] after spinal (epidural) anesthesia   Negative: [1] Vomiting AND [2] persists > 4 hours   Negative: [1] Vomiting AND [2] abdomen looks much more swollen than usual   Negative: [1] Drinking very little AND [2] dehydration suspected (e.g., no urine > 12 hours, very dry mouth, very lightheaded)    Answer Assessment - Initial Assessment Questions  1. SYMPTOM: \"What's the main symptom you're concerned about?\" (e.g., pain, fever, vomiting)      Continued, pain, tremors when standing and Hallucinations   2. ONSET: \"When did pain  start?\"      Post op, Hallucinations started Saturday   3. SURGERY: \"What surgery did you have?\"      Lower Back surgery  4. DATE of SURGERY: \"When was the surgery?\"       June 16,2025  5. ANESTHESIA: \" What type of anesthesia did you have?\" (e.g., general, spinal, epidural, local)      general  6. PAIN: \"Is there any pain?\" " "If Yes, ask: \"How bad is it?\"  (Scale 1-10; or mild, moderate, severe)      Pain 6/10 when lying flat   7. FEVER: \"Do you have a fever?\" If Yes, ask: \"What is your temperature, how was it measured, and when did it start?\"      No   8. VOMITING: \"Is there any vomiting?\" If Yes, ask: \"How many times?\"      no  9. BLEEDING: \"Is there any bleeding?\" If Yes, ask: \"How much?\" and \"Where?\"      no  10. OTHER SYMPTOMS: \"Do you have any other symptoms?\" (e.g., drainage from wound, painful urination, constipation)        Constipation treating with stool softener and Miralax, no appetite.    Protocols used: Post-Op Symptoms and Questions-ADULT-    "

## 2025-06-23 NOTE — CONSULTS
NEUROSURGERY CONSULTATION    Referring Provider: No ref. provider found    Patient Care Team:  Christin Yeh APRN as PCP - General (Family Medicine)  Christin Yeh APRN as Nurse Practitioner (Family Medicine)    Chief Complaint: Low back and bilateral hip and leg pain, altered mental status     History of Present Illness: Mr. Sterling is a 65-year-old gentleman that presented with progressive back pain with walking and standing intolerance.  Studies demonstrated spondylolisthesis at L4-5 with high-grade stenosis and a complete block on upright myelographic images.  He subsequently underwent L4-5 decompression with fusion and stabilization by Dr. Whitley on 6/16/2025.  Surgery was without overt complication.  He was able to ambulate with standby assist and discharged home on 6/19/2025.  Since 6/21, he has not been eating/drinking well or sleeping well.  By his wife's report, he is having intermittent visual illusions and hallucinations.  He denies fever, chills, headache.  He has had worsening pain in his low back that extends into both of his hips.  It makes it difficult for him to walk.      Review of Systems:  Musculoskeletal and Neurological systems were reviewed and are negative except for:  Musculoskeletal: positive for back pain.  Neurological: positive for difficulty walking.    History:  Past Medical History:   Diagnosis Date    Acid reflux     Anxiety     Arthritis 1990    OSTEO    Cervical disc disorder 1980    Bulging disc    Chronic pain disorder     Back     CTS (carpal tunnel syndrome) 2023    Elevated cholesterol     Gout     HL (hearing loss) 2022    Hypertension     Low back pain     Lumbosacral disc disease 1980    Bulging disc    Memory loss     Have an appointment to confirm    Peripheral neuropathy     Sinus headache     Sleep apnea     DOES NOT USE CPAP    Thoracic disc disorder 1980    Bulging disc    Vision loss     Wears glasses     Wears partial dentures     Full upper plate  "- advised no adhesives DOS   ,   Past Surgical History:   Procedure Laterality Date    CARPAL TUNNEL RELEASE Bilateral     COLONOSCOPY  2009    COLONOSCOPY N/A 2020    Procedure: COLONOSCOPY;  Surgeon: Chantale Naik MD;  Location: [facility] ENDOSCOPY;  Service: Gastroenterology;  Laterality: N/A;    CYST REMOVAL      FOREHEAD, SEBACEOUS CYST    LUMBAR LAMINECTOMY WITH FUSION N/A 2025    Procedure: LUMBAR FUSION DECOMPRESSION WITH PEDICLE SCREWS L4-5;  Surgeon: Yang Whitley MD;  Location: [facility];  Service: Neurosurgery;  Laterality: N/A;    MOUTH SURGERY      Oral extractions    ORIF WRIST FRACTURE Right    ,   Family History   Problem Relation Age of Onset    Diabetes Mother     Hypertension Mother     Breast cancer Mother     Dementia Mother     Alzheimer's disease Mother     Arthritis Mother         Knee replacement    Hyperlipidemia Mother     Diabetes Father     Hypertension Father     Dementia Father     Alzheimer's disease Father     No Known Problems Sister     No Known Problems Sister     No Known Problems Sister     No Known Problems Sister     No Known Problems Sister     No Known Problems Brother     No Known Problems Brother     No Known Problems Brother     Colon cancer Neg Hx    ,   Social History     Tobacco Use    Smoking status: Former     Current packs/day: 0.00     Average packs/day: 1 pack/day for 20.0 years (20.0 ttl pk-yrs)     Types: Cigarettes     Start date: 1989     Quit date:      Years since quittin.4     Passive exposure: Past    Smokeless tobacco: Never   Vaping Use    Vaping status: Never Used   Substance Use Topics    Alcohol use: Not Currently     Comment: quit 10 years ago     Drug use: No   , (Not in a hospital admission)   Allergies:  Patient has no known allergies.      Physical Exam:  Vital Signs: Blood pressure 131/82, pulse 108, temperature 98.2 °F (36.8 °C), temperature source Oral, resp. rate 16, height 175.3 cm (69\"), weight 134 kg (295 " lb), SpO2 97%.  The patient is awake and alert.  He is pleasant and follows all commands.    He is able to stand with assistance.  He takes very small, no steps.    Lumbar incision is covered, clean, and intact.  Minimal tenderness to palpation around the incision.  Heme staining is noted on dressing and Steri-Strips.    The patient moves bilateral lower extremities equally.  5/5.    Mild stiffness in the neck, but full range of motion in the cervical spine.  Brudzinski negative.    Data Review:  CBC:      Lab 06/23/25  1502   WBC 11.45*   HEMOGLOBIN 13.3   HEMATOCRIT 41.4   PLATELETS 307   NEUTROS ABS 8.20*   IMMATURE GRANS (ABS) 0.04   LYMPHS ABS 2.23   MONOS ABS 0.87   EOS ABS 0.08   MCV 84.0      CMP:        Lab 06/23/25  1502   SODIUM 135*   POTASSIUM 4.2   CHLORIDE 94*   CO2 28.0   ANION GAP 13.0   BUN 16.8   CREATININE 1.26   EGFR 62.5   GLUCOSE 108*   CALCIUM 9.7   MAGNESIUM 2.0   PHOSPHORUS 3.7   TOTAL PROTEIN 8.3   ALBUMIN 4.0   GLOBULIN 4.3   ALT (SGPT) 27   AST (SGOT) 33   BILIRUBIN 0.5   ALK PHOS 99      C-reactive protein dated today is 17.09      Diagnosis:  1.  Lumbar spondylolisthesis with stenosis and instability --s/p L4-5 lumbar fusion (POD #7)  2.  Altered mental status, hallucinations    Treatment Recommendations:  Per Dr. Whitley, obtain an MRI of the lumbar spine with and without contrast.  This is pending.  Elevated white count and C-reactive protein may be concerning for early onset of an infection, though lumbar incision looks well.  Neurosurgery will continue to follow.      Maye Johnson PA-C  06/23/25  16:00 EDT

## 2025-06-23 NOTE — TELEPHONE ENCOUNTER
Post-op Problem 06/23/2025 Caller concerned about her 's post op pain 6/10 when he is laying down. Tremors when stands. Hallucinating, seeing people that are not there and bugs. Disoriented   Reviewed guideline with caller, advises he be evaluated in ED. Caller states she will take him to [place] ED.

## 2025-06-23 NOTE — PROGRESS NOTES
"NEUROSURGERY PROGRESS NOTE     LOS: 0 days   Patient Care Team:  Christin Yeh APRN as PCP - General (Family Medicine)  Christin Yeh APRN as Nurse Practitioner (Family Medicine)    Chief Complaint: altered mental status                                 Low back and bilateral hip pain                                  Constipation                                  anorexia    Interval History:   Mr Sterling is 7 days postop from a lumbar fusion at L4/5 for spondylolisthesis with instability. Surgery was without complication and he was discharged to home on POD 3. He was doing well, ambulating with minimal assist. He was brought to the ER today by his wife who notes that since 6/21 he has been having visual hallucinations intermittently.   He has had some incisional pain and was prescribed oxycodone for pain. Per his report, he has not had a bowel movement since surgery. He denies abdominal pain. He has been passing gas. He has not had nausea, but has had very low appetite and anorexia.   He denies fever, chills, or headache. He has not had drainage from his incision. He notes that he has had very poor sleep.    He states that he has been using THC gummies to try to help with pain. He has had these in the past without reaction. He started taking them on Sat 6/21.     Review of Systems:   As above in history    Vital Signs: Blood pressure 101/80, pulse 101, temperature 98.2 °F (36.8 °C), temperature source Oral, resp. rate 16, height 175.3 cm (69\"), weight 134 kg (295 lb), SpO2 92%.  Intake/Output: No intake or output data in the 24 hours ending 06/23/25 1916    Physical Exam:  Patient is awake, alert, and oriented.  Speech is clear and appropriate   he denies any current hallucinations  Incision is clean, dry, and intact. Dressing is heme stained   Lower extremity strength and sensation is intact.     Data Review:    MRI of the lumbar spine was reviewed by Dr Whitley. The film quality is poor due to " patient habitus and motion artifact. Expected postoperative changes are noted. There is edema noted but no fluid collection concerning for abscess or large postop hematoma/ cord compression. .  WBC 11.45 High   9.84 10.59 7.31 R 7.41 R 6.11 R   RBC 4.93  5.27 5.51 5.14 R 5.31 R 5.21 R   Hemoglobin 13.3 12.6 Low  14.0 14.6 13.5 Low  R 14.2 R 13.9 R   Hematocrit 41.4 41.0          Glucose 108  Na 135  K 4.2  Cr 1.26  CRP elevated at 17.09  Ethanol negative   Urine drug screen positive for THC, oxycodone, and tricyclic antidepressants (patient is on elavil)  UA trace ketones and protein, negative for infection   Most recent HA1C 6.2  ESR and blood cultures pending     Assessment/Plan:  No immediate concern for infection/abscess, Dr Whitley appreciates hospitalist assistance to admit Mr Sterling for observation, pain management,  and bowel regimen. We will have him work with PT/OT.   We will obtain xrays of the lumbar spine to better assess the hardware. Ok for patient to have a diet tonight if desired.     Steph Alvarado PA-C  06/23/25  19:16 EDT

## 2025-06-24 LAB
ALBUMIN SERPL-MCNC: 3.5 G/DL (ref 3.5–5.2)
ALBUMIN/GLOB SERPL: 0.9 G/DL
ALP SERPL-CCNC: 93 U/L (ref 39–117)
ALT SERPL W P-5'-P-CCNC: 29 U/L (ref 1–41)
ANION GAP SERPL CALCULATED.3IONS-SCNC: 12 MMOL/L (ref 5–15)
AST SERPL-CCNC: 31 U/L (ref 1–40)
BASOPHILS # BLD AUTO: 0.03 10*3/MM3 (ref 0–0.2)
BASOPHILS NFR BLD AUTO: 0.3 % (ref 0–1.5)
BILIRUB SERPL-MCNC: 0.4 MG/DL (ref 0–1.2)
BUN SERPL-MCNC: 16.7 MG/DL (ref 8–23)
BUN/CREAT SERPL: 16.4 (ref 7–25)
CALCIUM SPEC-SCNC: 8.6 MG/DL (ref 8.6–10.5)
CHLORIDE SERPL-SCNC: 98 MMOL/L (ref 98–107)
CO2 SERPL-SCNC: 25 MMOL/L (ref 22–29)
CREAT SERPL-MCNC: 1.02 MG/DL (ref 0.76–1.27)
DEPRECATED RDW RBC AUTO: 42.3 FL (ref 37–54)
EGFRCR SERPLBLD CKD-EPI 2021: 80.6 ML/MIN/1.73
EOSINOPHIL # BLD AUTO: 0.14 10*3/MM3 (ref 0–0.4)
EOSINOPHIL NFR BLD AUTO: 1.3 % (ref 0.3–6.2)
ERYTHROCYTE [DISTWIDTH] IN BLOOD BY AUTOMATED COUNT: 13.9 % (ref 12.3–15.4)
GLOBULIN UR ELPH-MCNC: 3.7 GM/DL
GLUCOSE SERPL-MCNC: 128 MG/DL (ref 65–99)
HCT VFR BLD AUTO: 35.2 % (ref 37.5–51)
HGB BLD-MCNC: 11.4 G/DL (ref 13–17.7)
IMM GRANULOCYTES # BLD AUTO: 0.05 10*3/MM3 (ref 0–0.05)
IMM GRANULOCYTES NFR BLD AUTO: 0.5 % (ref 0–0.5)
LYMPHOCYTES # BLD AUTO: 1.69 10*3/MM3 (ref 0.7–3.1)
LYMPHOCYTES NFR BLD AUTO: 15.9 % (ref 19.6–45.3)
MAGNESIUM SERPL-MCNC: 1.7 MG/DL (ref 1.6–2.4)
MCH RBC QN AUTO: 26.9 PG (ref 26.6–33)
MCHC RBC AUTO-ENTMCNC: 32.4 G/DL (ref 31.5–35.7)
MCV RBC AUTO: 83 FL (ref 79–97)
MONOCYTES # BLD AUTO: 0.88 10*3/MM3 (ref 0.1–0.9)
MONOCYTES NFR BLD AUTO: 8.3 % (ref 5–12)
NEUTROPHILS NFR BLD AUTO: 7.84 10*3/MM3 (ref 1.7–7)
NEUTROPHILS NFR BLD AUTO: 73.7 % (ref 42.7–76)
NRBC BLD AUTO-RTO: 0 /100 WBC (ref 0–0.2)
PLATELET # BLD AUTO: 293 10*3/MM3 (ref 140–450)
PMV BLD AUTO: 9.7 FL (ref 6–12)
POTASSIUM SERPL-SCNC: 3.5 MMOL/L (ref 3.5–5.2)
PROT SERPL-MCNC: 7.2 G/DL (ref 6–8.5)
QT INTERVAL: 344 MS
QTC INTERVAL: 441 MS
RBC # BLD AUTO: 4.24 10*6/MM3 (ref 4.14–5.8)
SODIUM SERPL-SCNC: 135 MMOL/L (ref 136–145)
WBC NRBC COR # BLD AUTO: 10.63 10*3/MM3 (ref 3.4–10.8)

## 2025-06-24 PROCEDURE — 99232 SBSQ HOSP IP/OBS MODERATE 35: CPT | Performed by: INTERNAL MEDICINE

## 2025-06-24 PROCEDURE — 83735 ASSAY OF MAGNESIUM: CPT | Performed by: INTERNAL MEDICINE

## 2025-06-24 PROCEDURE — G0378 HOSPITAL OBSERVATION PER HR: HCPCS

## 2025-06-24 PROCEDURE — 97162 PT EVAL MOD COMPLEX 30 MIN: CPT

## 2025-06-24 PROCEDURE — 80053 COMPREHEN METABOLIC PANEL: CPT | Performed by: INTERNAL MEDICINE

## 2025-06-24 PROCEDURE — 97530 THERAPEUTIC ACTIVITIES: CPT

## 2025-06-24 PROCEDURE — 25810000003 SODIUM CHLORIDE 0.9 % SOLUTION: Performed by: INTERNAL MEDICINE

## 2025-06-24 PROCEDURE — 85025 COMPLETE CBC W/AUTO DIFF WBC: CPT | Performed by: INTERNAL MEDICINE

## 2025-06-24 PROCEDURE — 97165 OT EVAL LOW COMPLEX 30 MIN: CPT

## 2025-06-24 PROCEDURE — 99024 POSTOP FOLLOW-UP VISIT: CPT | Performed by: NEUROLOGICAL SURGERY

## 2025-06-24 RX ORDER — KETOROLAC TROMETHAMINE 15 MG/ML
15 INJECTION, SOLUTION INTRAMUSCULAR; INTRAVENOUS EVERY 6 HOURS PRN
Status: DISCONTINUED | OUTPATIENT
Start: 2025-06-24 | End: 2025-06-25 | Stop reason: HOSPADM

## 2025-06-24 RX ORDER — DONEPEZIL HYDROCHLORIDE 10 MG/1
10 TABLET, FILM COATED ORAL NIGHTLY
Status: DISCONTINUED | OUTPATIENT
Start: 2025-06-24 | End: 2025-06-25 | Stop reason: HOSPADM

## 2025-06-24 RX ORDER — LISINOPRIL 20 MG/1
20 TABLET ORAL
Status: DISCONTINUED | OUTPATIENT
Start: 2025-06-24 | End: 2025-06-25 | Stop reason: HOSPADM

## 2025-06-24 RX ORDER — POTASSIUM CHLORIDE 1.5 G/1.58G
40 POWDER, FOR SOLUTION ORAL ONCE
Status: COMPLETED | OUTPATIENT
Start: 2025-06-24 | End: 2025-06-24

## 2025-06-24 RX ORDER — DULOXETIN HYDROCHLORIDE 60 MG/1
60 CAPSULE, DELAYED RELEASE ORAL DAILY
Status: DISCONTINUED | OUTPATIENT
Start: 2025-06-24 | End: 2025-06-25 | Stop reason: HOSPADM

## 2025-06-24 RX ORDER — POTASSIUM CHLORIDE 1500 MG/1
40 TABLET, EXTENDED RELEASE ORAL EVERY 4 HOURS
Status: DISCONTINUED | OUTPATIENT
Start: 2025-06-24 | End: 2025-06-24

## 2025-06-24 RX ORDER — ATORVASTATIN CALCIUM 20 MG/1
20 TABLET, FILM COATED ORAL NIGHTLY
Status: DISCONTINUED | OUTPATIENT
Start: 2025-06-24 | End: 2025-06-25 | Stop reason: HOSPADM

## 2025-06-24 RX ORDER — AMITRIPTYLINE HYDROCHLORIDE 10 MG/1
20 TABLET ORAL NIGHTLY
Status: DISCONTINUED | OUTPATIENT
Start: 2025-06-24 | End: 2025-06-25 | Stop reason: HOSPADM

## 2025-06-24 RX ORDER — PANTOPRAZOLE SODIUM 40 MG/1
40 TABLET, DELAYED RELEASE ORAL
Status: DISCONTINUED | OUTPATIENT
Start: 2025-06-24 | End: 2025-06-25 | Stop reason: HOSPADM

## 2025-06-24 RX ORDER — ALLOPURINOL 100 MG/1
100 TABLET ORAL DAILY
Status: DISCONTINUED | OUTPATIENT
Start: 2025-06-24 | End: 2025-06-25 | Stop reason: HOSPADM

## 2025-06-24 RX ADMIN — Medication 10 ML: at 20:08

## 2025-06-24 RX ADMIN — POTASSIUM CHLORIDE 40 MEQ: 1500 TABLET, EXTENDED RELEASE ORAL at 09:14

## 2025-06-24 RX ADMIN — ATORVASTATIN CALCIUM 20 MG: 20 TABLET, FILM COATED ORAL at 01:28

## 2025-06-24 RX ADMIN — DULOXETINE 60 MG: 60 CAPSULE, DELAYED RELEASE ORAL at 09:14

## 2025-06-24 RX ADMIN — AMITRIPTYLINE HYDROCHLORIDE 20 MG: 10 TABLET, FILM COATED ORAL at 01:28

## 2025-06-24 RX ADMIN — DONEPEZIL HYDROCHLORIDE 10 MG: 10 TABLET ORAL at 01:28

## 2025-06-24 RX ADMIN — DOCUSATE SODIUM 50 MG AND SENNOSIDES 8.6 MG 2 TABLET: 8.6; 5 TABLET, FILM COATED ORAL at 09:14

## 2025-06-24 RX ADMIN — ATORVASTATIN CALCIUM 20 MG: 20 TABLET, FILM COATED ORAL at 20:08

## 2025-06-24 RX ADMIN — POTASSIUM CHLORIDE 40 MEQ: 1.5 FOR SOLUTION ORAL at 14:10

## 2025-06-24 RX ADMIN — LISINOPRIL 20 MG: 20 TABLET ORAL at 09:14

## 2025-06-24 RX ADMIN — SODIUM CHLORIDE 100 ML/HR: 9 INJECTION, SOLUTION INTRAVENOUS at 01:01

## 2025-06-24 RX ADMIN — ALLOPURINOL 100 MG: 100 TABLET ORAL at 09:14

## 2025-06-24 RX ADMIN — Medication 5 MG: at 20:08

## 2025-06-24 RX ADMIN — AMITRIPTYLINE HYDROCHLORIDE 20 MG: 10 TABLET, FILM COATED ORAL at 20:08

## 2025-06-24 RX ADMIN — Medication 5 MG: at 01:28

## 2025-06-24 RX ADMIN — PANTOPRAZOLE SODIUM 40 MG: 40 TABLET, DELAYED RELEASE ORAL at 06:35

## 2025-06-24 RX ADMIN — DONEPEZIL HYDROCHLORIDE 10 MG: 10 TABLET ORAL at 20:08

## 2025-06-24 NOTE — PROGRESS NOTES
"NEUROSURGERY PROGRESS NOTE     LOS: 0 days   Patient Care Team:  Christin Yeh APRN as PCP - General (Family Medicine)  Christin Yeh APRN as Nurse Practitioner (Family Medicine)    Chief Complaint:   1.  Low back and leg pain.  2.  Hallucinations.  3.  Constipation.    Interval History:   Patient Complaints: Constipation.  Patient Denies: Significant pain currently while he is in bed.    Review of Systems:   Musculoskeletal and Neurological systems were reviewed and are negative except for:  Musculoskeletal: positive for back pain.  Neurological: positive for difficulty walking and confusion.    Vital Signs: Blood pressure 152/91, pulse 99, temperature 98.5 °F (36.9 °C), temperature source Oral, resp. rate 20, height 175.3 cm (69\"), weight 129 kg (284 lb 9.8 oz), SpO2 100%.  Intake/Output:   Intake/Output Summary (Last 24 hours) at 6/24/2025 0618  Last data filed at 6/24/2025 0500  Gross per 24 hour   Intake 1000 ml   Output 200 ml   Net 800 ml       Physical Exam:  The patient is muttering to himself when I enter the room.  He is oriented to place and circumstances.  He follows all commands.  Motor function is intact in his lower extremities.  Incision is intact.     Data Review:    Plain films of lumbar spine demonstrate good position of his construct at the L4-5 level.    Assessment/Plan:  1.  L4-5 spondylolisthesis and instability status post decompression with fusion and stabilization on 6/16/2025.  Afebrile.  Incision looks good.  Low index of suspicion for postop infection.  2.  Hallucinations.  3.  Constipation.  4.  Morbid obesity.  5.  Disposition: Postop day #8.  Mobilize patient.  Work on constipation.  Appreciate hospitalist input.      Yang Whitley MD  06/24/25  06:18 EDT      "

## 2025-06-24 NOTE — PLAN OF CARE
Goal Outcome Evaluation:  Plan of Care Reviewed With: patient        Progress: no change  Outcome Evaluation: Pt. presents below baseline with ADLs and functional mobility. Limited by decreased safety awareness/confusion, activity tolerance, generalized weakness, and mild balance instability. Skilled OT services warranted to promote return to PLOF. Recommend home with assist at discharge.    Anticipated Discharge Disposition (OT): home with assist

## 2025-06-24 NOTE — PLAN OF CARE
Goal Outcome Evaluation:  Plan of Care Reviewed With: patient           Outcome Evaluation: Patient remains sllightly confused and lethergic. His overal strength and pain control is good. Some tremors noted in UEs and LEs. Ambulation limited by tachycardia. Recommend continued skilled PT and home with assistance at discharge.    Anticipated Discharge Disposition (PT): home with assist

## 2025-06-24 NOTE — PROGRESS NOTES
Clinical Nutrition     Patient Name: Bonilla Sterling Jr.  YOB: 1960  MRN: [id number]  Date of Encounter: 06/24/25 11:52 EDT  Admission date: 6/23/2025  Reason for Visit: Identified at risk by screening criteria    Assessment   Nutrition Assessment   Admission Diagnosis:  Recurrent low back pain [M54.50]    Problem List:    Recurrent low back pain      PMH:   He  has a past medical history of Acid reflux, Anxiety, Arthritis (1990), Cervical disc disorder (1980), Chronic pain disorder, CTS (carpal tunnel syndrome) (2023), Elevated cholesterol, Gout, HL (hearing loss) (2022), Hypertension, Low back pain, Lumbosacral disc disease (1980), Memory loss, Peripheral neuropathy, Sinus headache, Sleep apnea, Thoracic disc disorder (1980), Vision loss, Wears glasses, and Wears partial dentures.    PSH:  He  has a past surgical history that includes Colonoscopy (2009); Mouth surgery; Colonoscopy (N/A, 08/17/2020); Carpal tunnel release (Bilateral); ORIF wrist fracture (Right); Cyst Removal; and lumbar laminectomy with fusion (N/A, 6/16/2025).  Substance history: THC, remote tobacco  Applicable Nutrition History:     s/p recent back surgery 6-16-25    Labs    Labs Reviewed: Yes    Results from last 7 days   Lab Units 06/24/25  0608 06/23/25  1502   GLUCOSE mg/dL 128* 108*   BUN mg/dL 16.7 16.8   CREATININE mg/dL 1.02 1.26   SODIUM mmol/L 135* 135*   CHLORIDE mmol/L 98 94*   POTASSIUM mmol/L 3.5 4.2   PHOSPHORUS mg/dL  --  3.7   MAGNESIUM mg/dL 1.7 2.0   ALT (SGPT) U/L 29 27   LACTATE mmol/L  --  1.5       Results from last 7 days   Lab Units 06/24/25  0608 06/23/25  1502   ALBUMIN g/dL 3.5 4.0   CRP mg/dL  --  17.09*           Lab Results   Lab Value Date/Time    HGBA1C 6.20 (H) 06/09/2025 1416    HGBA1C 6.1 (H) 02/04/2025 1015    HGBA1C 5.8 (H) 01/05/2024 1035               Medications    Medications Reviewed: yes    Scheduled Meds:acetaminophen, 1,000 mg, Oral, Once  allopurinol, 100 mg, Oral,  "Daily  amitriptyline, 20 mg, Oral, Nightly  atorvastatin, 20 mg, Oral, Nightly  donepezil, 10 mg, Oral, Nightly  DULoxetine, 60 mg, Oral, Daily  lisinopril, 20 mg, Oral, Q24H  melatonin, 5 mg, Oral, Nightly  pantoprazole, 40 mg, Oral, Q AM  potassium chloride ER, 40 mEq, Oral, Q4H  sodium chloride, 10 mL, Intravenous, Q12H      Continuous Infusions:sodium chloride, 100 mL/hr, Last Rate: 100 mL/hr (06/24/25 0101)      PRN Meds:.  acetaminophen **OR** acetaminophen **OR** acetaminophen    senna-docusate sodium **AND** polyethylene glycol **AND** bisacodyl **AND** bisacodyl    Calcium Replacement - Follow Nurse / BPA Driven Protocol    HYDROcodone-acetaminophen    Magnesium Standard Dose Replacement - Follow Nurse / BPA Driven Protocol    Morphine    nitroglycerin    ondansetron    Phosphorus Replacement - Follow Nurse / BPA Driven Protocol    Potassium Replacement - Follow Nurse / BPA Driven Protocol    sodium chloride    sodium chloride    Intake/Ouptut 24 hrs (0701 - 0700)   I&O's Reviewed: yes  Intake & Output (last day)         06/23 0701 06/24 0700 06/24 0701 06/25 0700    IV Piggyback 1000     Total Intake(mL/kg) 1000 (7.8)     Urine (mL/kg/hr) 200 200 (0.3)    Total Output 200 200    Net +800 -200                   Anthropometrics     Height: Height: 175.3 cm (69\")  Last Filed Weight: Weight: 129 kg (284 lb 9.8 oz) (06/24/25 0013)  Method: Weight Method: Bed scale  BMI: BMI (Calculated): 42    UBW: 285lb per pt    Weight change: ? 9lb wt loss over past week per EMR     Weight       Weight (kg) Weight (lbs) Weight Method Visit Report   7/22/2020 128.822 kg  284 lb   --    8/13/2020 127.914 kg  282 lb  Stated     8/17/2020 128.935 kg  284 lb 4 oz  Standing scale     9/14/2020 131.09 kg  289 lb   --    8/13/2023 131.09 kg  289 lb      8/28/2023 131.09 kg  289 lb   --    4/22/2024 127.007 kg  280 lb  Standing scale     7/29/2024 131.997 kg  291 lb   --    9/30/2024 131.997 kg  291 lb   --    11/26/2024 131.543 kg "  290 lb   --    12/12/2024 [Locked]  [Locked]   [Locked]    3/28/2025 134.718 kg  297 lb   --    4/15/2025 133.811 kg  295 lb  Stated     4/28/2025 134 kg  295 lb 6.7 oz  Standing scale     5/2/2025 133.358 kg  294 lb   --    5/27/2025 132.904 kg  293 lb   --    6/9/2025 133.3 kg  293 lb 14 oz  Standing scale     6/23/2025 133.811 kg  295 lb  Stated     6/24/2025 129.1 kg  284 lb 9.8 oz  Bed scale        Legend:  [Locked] Data is confidential and restricted  Nutrition Focused Physical Exam     Date:     Unable to perform due to RD to f/u for NPFE*     Subjective   Reported/Observed/Food/Nutrition Related History:     Pt sitting up in chair, on room air, reports, not much appetite, his stomach feels crampy, has been rumbling, cantl remember if he has been eating anything at home,  is asking where his wife is    Per RN: pt has not been hallucinating, but only  able to answer some of his orientation questions correctly, has not had bm in a week, finally had bm last night, is able to swallow ok    Current Nutrition Prescription     PO: Diet: Cardiac, Diabetic; Healthy Heart (2-3 Na+); Consistent Carbohydrate; Fluid Consistency: Thin (IDDSI 0)  Oral Nutrition Supplement:  Intake: Insufficient data    Assessment & Plan   Nutrition Diagnosis   Date: 6-24-25 Updated:   Problem Predicted inadequate nutrient intake    Etiology AMS post op   Signs/Symptoms Reported poor oral intake past week per wife   Status: New    Goal:   Nutrition to support treatment and Establish PO      Nutrition Intervention      Follow treatment progress, Care plan reviewed, Advised available snacks, Interview for preferences, Encourage intake, Supplement provided    Add Boost + 2x/day    Need standing scale weight  to better assess wt loss    Monitoring/Evaluation:   Per protocol, I&O, PO intake, Pertinent labs, Weight, Skin status, GI status, Symptoms, Swallow function    Hailee Qiu, DAVID  Time Spent: 30min

## 2025-06-24 NOTE — ED NOTES
Bonilla Sterling Jr.    Nursing Report ED to Floor:  Mental status: A&O X4  Ambulatory status: up with 2   Oxygen Therapy:  RA  Cardiac Rhythm: sinus tach   Admitted from: home  Safety Concerns:  none  Precautions: none  Social Issues: na  ED Room #:  10    ED Nurse Phone Extension - [phone] or may call [phone].      HPI:   Chief Complaint   Patient presents with    Back Pain       Past Medical History:  Past Medical History:   Diagnosis Date    Acid reflux     Anxiety     Arthritis 1990    OSTEO    Cervical disc disorder 1980    Bulging disc    Chronic pain disorder     Back     CTS (carpal tunnel syndrome) 2023    Elevated cholesterol     Gout     HL (hearing loss) 2022    Hypertension     Low back pain     Lumbosacral disc disease 1980    Bulging disc    Memory loss     Have an appointment to confirm    Peripheral neuropathy     Sinus headache     Sleep apnea     DOES NOT USE CPAP    Thoracic disc disorder 1980    Bulging disc    Vision loss     Wears glasses     Wears partial dentures     Full upper plate - advised no adhesives DOS        Past Surgical History:  Past Surgical History:   Procedure Laterality Date    CARPAL TUNNEL RELEASE Bilateral     COLONOSCOPY  2009    COLONOSCOPY N/A 08/17/2020    Procedure: COLONOSCOPY;  Surgeon: Chantale Naik MD;  Location: [facility] ENDOSCOPY;  Service: Gastroenterology;  Laterality: N/A;    CYST REMOVAL      FOREHEAD, SEBACEOUS CYST    LUMBAR LAMINECTOMY WITH FUSION N/A 6/16/2025    Procedure: LUMBAR FUSION DECOMPRESSION WITH PEDICLE SCREWS L4-5;  Surgeon: Yang Whitley MD;  Location: [facility] OR;  Service: Neurosurgery;  Laterality: N/A;    MOUTH SURGERY      Oral extractions    ORIF WRIST FRACTURE Right         Admitting Doctor:   Patrice Pierre III, DO    Consulting Provider(s):  Consults       No orders found from 5/25/2025 to 6/24/2025.             Admitting Diagnosis:   The primary encounter diagnosis was Encephalopathy acute. Diagnoses of Postoperative  pain, Polypharmacy, and Constipation, unspecified constipation type were also pertinent to this visit.    Most Recent Vitals:   Vitals:    06/23/25 1900 06/23/25 2000 06/23/25 2030 06/23/25 2100   BP: 101/80 110/73 107/67 139/100   BP Location:       Patient Position:       Pulse: 101 112 104 110   Resp:       Temp:       TempSrc:       SpO2: 92% 100% 96% 91%   Weight:       Height:           Active LDAs/IV Access:   Lines, Drains & Airways       Active LDAs       Name Placement date Placement time Site Days    Peripheral IV 06/23/25 1518 20 G Left Antecubital 06/23/25  1518  Antecubital  less than 1                    Labs (abnormal labs have a star):   Labs Reviewed   COMPREHENSIVE METABOLIC PANEL - Abnormal; Notable for the following components:       Result Value    Glucose 108 (*)     Sodium 135 (*)     Chloride 94 (*)     All other components within normal limits    Narrative:     GFR Categories in Chronic Kidney Disease (CKD)              GFR Category          GFR (mL/min/1.73)    Interpretation  G1                    90 or greater        Normal or high (1)  G2                    60-89                Mild decrease (1)  G3a                   45-59                Mild to moderate decrease  G3b                   30-44                Moderate to severe decrease  G4                    15-29                Severe decrease  G5                    14 or less           Kidney failure    (1)In the absence of evidence of kidney disease, neither GFR category G1 or G2 fulfill the criteria for CKD.    eGFR calculation 2021 CKD-EPI creatinine equation, which does not include race as a factor   URINALYSIS W/ MICROSCOPIC IF INDICATED (NO CULTURE) - Abnormal; Notable for the following components:    Ketones, UA Trace (*)     Protein, UA Trace (*)     All other components within normal limits    Narrative:     Urine microscopic not indicated.   C-REACTIVE PROTEIN - Abnormal; Notable for the following components:    C-Reactive  Protein 17.09 (*)     All other components within normal limits   URINE DRUG SCREEN - Abnormal; Notable for the following components:    THC, Screen, Urine Positive (*)     Tricyclic Antidepressants Screen Positive (*)     Oxycodone Screen, Urine Positive (*)     All other components within normal limits    Narrative:     Cutoff For Drugs Screened:    Amphetamines               500 ng/ml  Barbiturates               200 ng/ml  Benzodiazepines            150 ng/ml  Cocaine                    150 ng/ml  Methadone                  200 ng/ml  Opiates                    100 ng/ml  Phencyclidine               25 ng/ml  THC                         50 ng/ml  Methamphetamine            500 ng/ml  Tricyclic Antidepressants  300 ng/ml  Oxycodone                  100 ng/ml  Buprenorphine               10 ng/ml    The normal value for all drugs tested is negative. This report includes unconfirmed screening results, with the cutoff values listed, to be used for medical treatment purposes only.  Unconfirmed results must not be used for non-medical purposes such as employment or legal testing.  Clinical consideration should be applied to any drug of abuse test, particularly when unconfirmed results are used.     CBC WITH AUTO DIFFERENTIAL - Abnormal; Notable for the following components:    WBC 11.45 (*)     Lymphocyte % 19.5 (*)     Neutrophils, Absolute 8.20 (*)     All other components within normal limits   COVID-19/FLUA&B/RSV, NP SWAB IN TRANSPORT MEDIA 1 HR TAT - Normal    Narrative:     Fact sheet for providers: https://www.fda.gov/media/605717/download    Fact sheet for patients: https://www.fda.gov/media/706910/download    Test performed by PCR.   LACTIC ACID, PLASMA - Normal   PROCALCITONIN - Normal    Narrative:     As a Marker for Sepsis (Non-Neonates):    1. <0.5 ng/mL represents a low risk of severe sepsis and/or septic shock.  2. >2 ng/mL represents a high risk of severe sepsis and/or septic shock.    As a Marker for  "Lower Respiratory Tract Infections that require antibiotic therapy:    PCT on Admission    Antibiotic Therapy       6-12 Hrs later    >0.5                Strongly Recommended  >0.25 - <0.5        Recommended   0.1 - 0.25          Discouraged              Remeasure/reassess PCT  <0.1                Strongly Discouraged     Remeasure/reassess PCT    As 28 day mortality risk marker: \"Change in Procalcitonin Result\" (>80% or <=80%) if Day 0 (or Day 1) and Day 4 values are available. Refer to [email or web address]    Change in PCT <=80%  A decrease of PCT levels below or equal to 80% defines a positive change in PCT test result representing a higher risk for 28-day all-cause mortality of patients diagnosed with severe sepsis for septic shock.    Change in PCT >80%  A decrease of PCT levels of more than 80% defines a negative change in PCT result representing a lower risk for 28-day all-cause mortality of patients diagnosed with severe sepsis or septic shock.      SALICYLATE LEVEL - Normal   ETHANOL - Normal    Narrative:     Elevated lactic acid concentration and lactate dehydrogenase(LD) activity may falsely elevate enzymatically determined ethanol levels. Not for legal purposes.    ACETAMINOPHEN LEVEL - Normal   MAGNESIUM - Normal   PHOSPHORUS - Normal   TSH RFX ON ABNORMAL TO FREE T4 - Normal   FENTANYL, URINE - Normal    Narrative:     Negative Threshold:      Fentanyl 5 ng/mL     The normal value for the drug tested is negative. This report includes final unconfirmed screening results to be used for medical treatment purposes only. Unconfirmed results must not be used for non-medical purposes such as employment or legal testing. Clinical consideration should be applied to any drug of abuse test, particularly when unconfirmed results are used.          COVID PRE-OP / PRE-PROCEDURE SCREENING ORDER (NO ISOLATION)    Narrative:     The following orders were created for panel order COVID PRE-OP / " PRE-PROCEDURE SCREENING ORDER (NO ISOLATION) - Swab, Nasopharynx.  Procedure                               Abnormality         Status                     ---------                               -----------         ------                     COVID-19, FLU A/B, RSV P...[[id number]]  Normal              Final result                 Please view results for these tests on the individual orders.   BLOOD CULTURE   BLOOD CULTURE   RAINBOW DRAW    Narrative:     The following orders were created for panel order [place] Draw.  Procedure                               Abnormality         Status                     ---------                               -----------         ------                     Green Top (Gel)[[id number]]                                  Final result               Lavender Top[[id number]]                                     Final result               Gold Top - SST[[id number]]                                   Final result               Carrera Top[[id number]]                                         Final result               Light Blue Top[[id number]]                                   Final result                 Please view results for these tests on the individual orders.   GREEN TOP   LAVENDER TOP   GOLD TOP - SST   GRAY TOP   LIGHT BLUE TOP   CBC AND DIFFERENTIAL    Narrative:     The following orders were created for panel order CBC & Differential.  Procedure                               Abnormality         Status                     ---------                               -----------         ------                     CBC Auto Differential[[id number]]        Abnormal            Final result                 Please view results for these tests on the individual orders.       Meds Given in ED:   Medications   acetaminophen (TYLENOL) tablet 1,000 mg (1,000 mg Oral Not Given 6/23/25 1524)   sodium chloride 0.9 % flush 10 mL (has no administration in time range)   sodium chloride 0.9 % flush 10 mL (has no  administration in time range)   sodium chloride 0.9 % infusion 40 mL (has no administration in time range)   nitroglycerin (NITROSTAT) SL tablet 0.4 mg (has no administration in time range)   sodium chloride 0.9 % infusion (has no administration in time range)   Potassium Replacement - Follow Nurse / BPA Driven Protocol (has no administration in time range)   Magnesium Standard Dose Replacement - Follow Nurse / BPA Driven Protocol (has no administration in time range)   Phosphorus Replacement - Follow Nurse / BPA Driven Protocol (has no administration in time range)   Calcium Replacement - Follow Nurse / BPA Driven Protocol (has no administration in time range)   acetaminophen (TYLENOL) tablet 650 mg (has no administration in time range)     Or   acetaminophen (TYLENOL) 160 MG/5ML oral solution 650 mg (has no administration in time range)     Or   acetaminophen (TYLENOL) suppository 650 mg (has no administration in time range)   HYDROcodone-acetaminophen (NORCO) 5-325 MG per tablet 1 tablet (has no administration in time range)   HYDROmorphone (DILAUDID) injection 0.5 mg (has no administration in time range)     And   naloxone (NARCAN) injection 0.4 mg (has no administration in time range)   melatonin tablet 5 mg (has no administration in time range)   sennosides-docusate (PERICOLACE) 8.6-50 MG per tablet 2 tablet (has no administration in time range)     And   polyethylene glycol (MIRALAX) packet 17 g (has no administration in time range)     And   bisacodyl (DULCOLAX) EC tablet 5 mg (has no administration in time range)     And   bisacodyl (DULCOLAX) suppository 10 mg (has no administration in time range)   ondansetron (ZOFRAN) injection 4 mg (has no administration in time range)   sodium chloride 0.9 % bolus 1,000 mL (0 mL Intravenous Stopped 6/23/25 1950)   ketorolac (TORADOL) injection 15 mg (15 mg Intravenous Given 6/23/25 1617)   gadobenate dimeglumine (MULTIHANCE) injection 20 mL (20 mL Intravenous Given  6/23/25 1739)     sodium chloride, 100 mL/hr         Last NIH score:                                                          Dysphagia screening results:  Patient Factors Component (Dysphagia:Stroke or Rule-out)  Best Eye Response: 4-->(E4) spontaneous (06/23/25 1620)  Best Motor Response: 6-->(M6) obeys commands (06/23/25 1620)  Best Verbal Response: 5-->(V5) oriented (06/23/25 1620)  [place] Coma Scale Score: 15 (06/23/25 1620)     [place] Coma Scale:  No data recorded     CIWA:        Restraint Type:            Isolation Status:  No active isolations

## 2025-06-24 NOTE — PROGRESS NOTES
"    [facility] Medicine Services  PROGRESS NOTE    Patient Name: Bonilla Sterling Jr.  : 1960  MRN: [id number]    Date of Admission: 2025  Primary Care Physician: Christin Yeh APRN    Subjective   Subjective     CC:  Hallucinations,back pain    HPI:  Patient sitting up in bed. Denies any further hallucinations but is asking what he can take to \"make them go away\". We discussed stopping any offending medications that may have been causing the issues. Notably his cannabis use along w/pain medications.       Objective   Objective     Vital Signs:   Temp:  [98.2 °F (36.8 °C)-98.7 °F (37.1 °C)] 98.5 °F (36.9 °C)  Heart Rate:  [] 106  Resp:  [16-20] 18  BP: (101-156)/() 128/94     Physical Exam:  Constitutional: No acute distress, awake, alert  HENT: NCAT, mucous membranes moist  Respiratory: Clear to auscultation bilaterally, respiratory effort normal   Cardiovascular: RRR, no murmurs, rubs, or gallops  Gastrointestinal: soft, nontender, nondistended  Musculoskeletal: No bilateral ankle edema  Psychiatric: Appropriate affect, cooperative  Neurologic: Oriented x 3, speech clear, no focal deficits  Skin: No rashes      Results Reviewed:  LAB RESULTS:      Lab 25  0609 25  1502 255   WBC 10.63 11.45*  --    HEMOGLOBIN 11.4* 13.3 12.6*   HEMATOCRIT 35.2* 41.4 41.0   PLATELETS 293 307  --    NEUTROS ABS 7.84* 8.20*  --    IMMATURE GRANS (ABS) 0.05 0.04  --    LYMPHS ABS 1.69 2.23  --    MONOS ABS 0.88 0.87  --    EOS ABS 0.14 0.08  --    MCV 83.0 84.0  --    CRP  --  17.09*  --    PROCALCITONIN  --  0.11  --    LACTATE  --  1.5  --          Lab 25  0608 25  1502   SODIUM 135* 135*   POTASSIUM 3.5 4.2   CHLORIDE 98 94*   CO2 25.0 28.0   ANION GAP 12.0 13.0   BUN 16.7 16.8   CREATININE 1.02 1.26   EGFR 80.6 62.5   GLUCOSE 128* 108*   CALCIUM 8.6 9.7   MAGNESIUM 1.7 2.0   PHOSPHORUS  --  3.7   TSH  --  1.170         Lab 25  0608 " 06/23/25  1502   TOTAL PROTEIN 7.2 8.3   ALBUMIN 3.5 4.0   GLOBULIN 3.7 4.3   ALT (SGPT) 29 27   AST (SGOT) 31 33   BILIRUBIN 0.4 0.5   ALK PHOS 93 99                     Brief Urine Lab Results  (Last result in the past 365 days)        Color   Clarity   Blood   Leuk Est   Nitrite   Protein   CREAT   Urine HCG        06/23/25 1517 Yellow   Clear   Negative   Negative   Negative   Trace                   Microbiology Results Abnormal       None            XR Abdomen KUB  Result Date: 6/23/2025  XR ABDOMEN KUB, XR SPINE LUMBAR 2 OR 3 VW Date of Exam: 6/23/2025 7:28 PM EDT Indication: postoperative constipation Comparison: Lumbar spine MRI 6/23/2025 Findings: Moderate colonic stool. No dilated loops of bowel to suggest ileus or obstruction. No significant gastric distention. No appreciable renal calcifications. Mild to moderate degenerative osteoarthritis of the hip joints. L4-L5 spinal fusion and discectomy. No visualized hardware fracture or signs of loosening. No visualized displaced fracture or vertebral body height loss. Minimal grade 1 anterolisthesis L4 and L5 stable from prior. Mild lumbar dextrocurvature possibly positional. Residual degenerative disc disease and facet arthropathy better assessed on MRI performed same day. Abdominal aortic atherosclerotic disease. Degenerative osteoarthritis of the SI joints.     Impression: Impression: 1. Moderate formed colonic stool without obstruction. 2. Radiographically uncomplicated L4-L5 fusion hardware. Spondylotic change better assessed on same day lumbar spine MRI. Electronically Signed: Bethel Sanchez MD  6/23/2025 8:03 PM EDT  Workstation ID: [id number]    XR Spine Lumbar 2 or 3 View  Result Date: 6/23/2025  XR ABDOMEN KUB, XR SPINE LUMBAR 2 OR 3 VW Date of Exam: 6/23/2025 7:28 PM EDT Indication: postoperative constipation Comparison: Lumbar spine MRI 6/23/2025 Findings: Moderate colonic stool. No dilated loops of bowel to suggest ileus or obstruction. No  significant gastric distention. No appreciable renal calcifications. Mild to moderate degenerative osteoarthritis of the hip joints. L4-L5 spinal fusion and discectomy. No visualized hardware fracture or signs of loosening. No visualized displaced fracture or vertebral body height loss. Minimal grade 1 anterolisthesis L4 and L5 stable from prior. Mild lumbar dextrocurvature possibly positional. Residual degenerative disc disease and facet arthropathy better assessed on MRI performed same day. Abdominal aortic atherosclerotic disease. Degenerative osteoarthritis of the SI joints.     Impression: Impression: 1. Moderate formed colonic stool without obstruction. 2. Radiographically uncomplicated L4-L5 fusion hardware. Spondylotic change better assessed on same day lumbar spine MRI. Electronically Signed: Bethel Sanchez MD  6/23/2025 8:03 PM EDT  Workstation ID: [id number]    MRI Lumbar Spine With & Without Contrast  Result Date: 6/23/2025  MRI LUMBAR SPINE W WO CONTRAST Date of Exam: 6/23/2025 5:26 PM EDT Indication: Worsening back pain and weakness, encephalopathy following 6/16 fusion.  Comparison: Lumbar spine CT 4/15/2025. MR lumbar spine 3/6/2025. Technique:  Routine multiplanar/multisequence sequence images of the lumbar spine were obtained before and after the uneventful administration of 20 mL Multihance.  Findings: Study is degraded by motion artifact. Within technical limitation: Vertebral numbering: The last well formed disc is labeled L5-S1. Alignment: There is straightening of normal lumbar lordosis. Vertebrae: Posterior decompression with instrumented and interbody fusion at L4-5 with associated hardware artifacts which limits evaluation of adjacent structures, including adjacent vertebral bodies and spinal canal. Multilevel degenerative endplate changes, including Modic type II/fatty endplate degenerative change at L5-S1.. Vertebral body heights are otherwise maintained.No focal suspicious appearing  marrow lesions or bone marrow edema within limitation of hardware artifacts. Discs and spinal canal: Multilevel disc desiccation and disc height loss. Degenerative changes detailed below by level.. Spinal cord and cauda equina: The visible spinal cord has normal signal and morphology. No cauda equina compression. The conus tip lies at L2 level. Adjacent soft tissues: Posterior paraspinal soft tissue edema, without organized fluid collection seen, likely postoperative. After intravenous contrast, no convincing abnormal enhancement, noting hardware artifacts precludes accurate evaluation on postcontrast sequences from L3-4 through L4-5 level. Findings by level: T11-12: No significant interval change.. No significant posterior disc bulge.. Mild bilateral facet arthropathy. No significant spinal canal or foraminal stenosis. T12-L1: No significant interval change. No significant posterior disc bulge. Mild to moderate bilateral facet arthropathy and ligamentum flavum thickening. No significant spinal canal or foraminal stenosis. L1-2: No significant interval change. No significant posterior disc bulge. Moderate bilateral facet arthropathy with ligamentum flavum thickening. No significant spinal canal or foraminal stenosis. L2-3: No significant interval change. Mild diffuse disc bulge. Moderate bilateral facet arthropathy with ligamentum flavum thickening. No significant spinal canal or foraminal stenosis. L3-4: No significant interval change. Diffuse disc bulge with osteophytic ridging. Severe bilateral facet arthropathy with ligamentum flavum thickening. Mild spinal canal stenosis. Moderate bilateral foraminal narrowing. L4-5: Interval postoperative changes. Interval improved spinal canal stenosis. Spinal canal appears grossly patent. Bilateral facet arthropathy. Severe left greater than right foraminal narrowing. L5-S1: No significant interval change. Osteophytic ridging. Severe bilateral facet arthropathy.. No  significant spinal canal stenosis. Moderate to severe bilateral foraminal narrowing.     Impression: Impression: Study is degraded by motion artifact. Interval postoperative changes at L4-5 with improved spinal canal stenosis at this level. Posterior paraspinal soft tissue edema without organized collection, favored postoperative. Otherwise, no significant interval change in multilevel degenerative changes in the lumbar spine. Electronically Signed: Dimitris Narvaez MD  6/23/2025 6:08 PM EDT  Workstation ID: [id number]    CT Head Without Contrast  Result Date: 6/23/2025  CT HEAD WO CONTRAST Date of Exam: 6/23/2025 3:53 PM EDT Indication: altered mental status, suspect metabolic. Comparison: Head CT 4/20/2025. Brain MRI 9/11/2024. Technique: Axial CT images were obtained of the head without contrast administration.  Automated exposure control and iterative construction methods were used. Findings: No acute intracranial hemorrhage.Intact appearing gray-white differentiation.No extra-axial fluid collection.No significant mass effect. No hydrocephalus. Mild generalized parenchymal volume loss. Scattered areas of periventricular and subcortical white matter hypoattenuation, nonspecific, perhaps from small vessel ischemic/hypertensive changes in a patient of this age.There are intracranial atherosclerotic calcifications. Mild scattered mucosal thickening in the paranasal sinuses.Mastoid air cells are essentially clear.Included globes and orbits appear unremarkable by CT. No acute or aggressive appearing bony or extracranial soft tissue process.     Impression: Impression: No acute intracranial findings. Electronically Signed: Dimitris Narvaez MD  6/23/2025 4:04 PM EDT  Workstation ID: [id number]    XR Chest 1 View  Result Date: 6/23/2025  XR CHEST 1 VW Date of Exam: 6/23/2025 3:25 PM EDT Indication: Altered mental status, infectious work-up Comparison: None available. Findings: The cardiomediastinal silhouette is within  normal limits. Pulmonary vascularity appears normal. There is no acute airspace consolidation, pleural effusion, or pneumothorax. There are degenerative changes of the thoracic spine.     Impression: Impression: No acute cardiopulmonary abnormality. Electronically Signed: Keven Muir MD  6/23/2025 3:39 PM EDT  Workstation ID: [id number]          Current medications:  Scheduled Meds:acetaminophen, 1,000 mg, Oral, Once  allopurinol, 100 mg, Oral, Daily  amitriptyline, 20 mg, Oral, Nightly  atorvastatin, 20 mg, Oral, Nightly  donepezil, 10 mg, Oral, Nightly  DULoxetine, 60 mg, Oral, Daily  lisinopril, 20 mg, Oral, Q24H  melatonin, 5 mg, Oral, Nightly  pantoprazole, 40 mg, Oral, Q AM  potassium chloride ER, 40 mEq, Oral, Q4H  sodium chloride, 10 mL, Intravenous, Q12H      Continuous Infusions:sodium chloride, 100 mL/hr, Last Rate: 100 mL/hr (06/24/25 0101)      PRN Meds:.  acetaminophen **OR** acetaminophen **OR** acetaminophen    senna-docusate sodium **AND** polyethylene glycol **AND** bisacodyl **AND** bisacodyl    Calcium Replacement - Follow Nurse / BPA Driven Protocol    HYDROcodone-acetaminophen    Magnesium Standard Dose Replacement - Follow Nurse / BPA Driven Protocol    Morphine    nitroglycerin    ondansetron    Phosphorus Replacement - Follow Nurse / BPA Driven Protocol    Potassium Replacement - Follow Nurse / BPA Driven Protocol    sodium chloride    sodium chloride    Assessment & Plan   Assessment & Plan     Active Hospital Problems    Diagnosis  POA    **Recurrent low back pain [M54.50]  Yes      Resolved Hospital Problems   No resolved problems to display.        Brief Hospital Course to date:  Carbon ALEKS Sterling Jr. is a 65 y.o. male with postoperative lumbar spine pain, also constipation, as well as visual hallucinations secondary to medications    L4-L5 spondylolisthesis s/p decompression and fusion on 6/16   Postoperative lumbar spine pain  - Pain control with oral agents, IV if needed.  - PT/OT,  plan is home at discharge  - Dr. Whitley following.    Patient hallucinations secondary to medications/THC  - likely secondary to use of both opioid pain medications in combination w/THC use  - discussed avoiding these two things together, if hallucinations continue despite discontinuation, then can work-up further.  - switch percocet to norco, add toradol, PRN tylenol     Constipation  - IVF with 0.9 NS, as he is clinically dry and has had decreased oral intake postop.  - Bowel regimen ordered as needed.  - Hold HCTZ while receiving IV fluids.     Hypertension  - Continue lisinopril from home regimen, hold HCTZ while receiving IV fluids.     Hyperlipidemia  - Continue statin from home regimen.     Anxiety/depression  - Continue Cymbalta and Elavil from home regimen.     Gout  - Continue allopurinol.  - Not currently in exacerbation/acute flare.     Alzheimer's dementia  - Continue Aricept and aforementioned antidepressants.     GERD  - No therapy for this listed on his home/complete reviewed medication list, will add daily PPI.     Sleep apnea  - He does not use CPAP at home, declines to use while here.    Expected Discharge Location and Transportation: Home  Expected Discharge   Expected discharge date/ time has not been documented.     VTE Prophylaxis:  Mechanical VTE prophylaxis orders are present.         AM-PAC 6 Clicks Score (PT): 12 (06/24/25 6783)    CODE STATUS:   Code Status and Medical Interventions: CPR (Attempt to Resuscitate); Full Support   Ordered at: 06/23/25 2049     Code Status (Patient has no pulse and is not breathing):    CPR (Attempt to Resuscitate)     Medical Interventions (Patient has pulse or is breathing):    Full Support     Level Of Support Discussed With:    Patient       Madeline Judd, DO  06/24/25

## 2025-06-24 NOTE — CASE MANAGEMENT/SOCIAL WORK
Continued Stay Note  [facility]     Patient Name: Bonilla Sterling Jr.  MRN: [id number]  Today's Date: 6/24/2025    Admit Date: 6/23/2025    Plan: home   Discharge Plan       Row Name 06/24/25 0915       Plan    Plan home    Patient/Family in Agreement with Plan yes    Plan Comments Pt lives in [facility] with wife. He was independent with ADLs and mobility prior to admit. Denies current use of any DME. Pt is followed by his PCP and has drug coverage. At this time his plan for discharge is to return home. CM is followin for therapy evals and will make referrals as needed.    Final Discharge Disposition Code 01 - home or self-care                   Discharge Codes    No documentation.                       Rosalva Dial RN

## 2025-06-24 NOTE — THERAPY EVALUATION
Patient Name: Bonilla Sterling Jr.  : 1960    MRN: [id number]                              Today's Date: 2025       Admit Date: 2025    Visit Dx:     ICD-10-CM ICD-9-CM   1. Encephalopathy acute  G93.40 348.30   2. Postoperative pain  G89.18 338.18   3. Polypharmacy  Z79.899 V58.69   4. Constipation, unspecified constipation type  K59.00 564.00     Patient Active Problem List   Diagnosis    Hypertension    Gastroesophageal reflux disease without esophagitis    Bulging of lumbar intervertebral disc    Lumbar stenosis with neurogenic claudication    Recurrent low back pain     Past Medical History:   Diagnosis Date    Acid reflux     Anxiety     Arthritis     OSTEO    Cervical disc disorder     Bulging disc    Chronic pain disorder     Back     CTS (carpal tunnel syndrome)     Elevated cholesterol     Gout     HL (hearing loss)     Hypertension     Low back pain     Lumbosacral disc disease     Bulging disc    Memory loss     Have an appointment to confirm    Peripheral neuropathy     Sinus headache     Sleep apnea     DOES NOT USE CPAP    Thoracic disc disorder     Bulging disc    Vision loss     Wears glasses     Wears partial dentures     Full upper plate - advised no adhesives DOS     Past Surgical History:   Procedure Laterality Date    CARPAL TUNNEL RELEASE Bilateral     COLONOSCOPY  2009    COLONOSCOPY N/A 2020    Procedure: COLONOSCOPY;  Surgeon: Chantale Naik MD;  Location: [facility] ENDOSCOPY;  Service: Gastroenterology;  Laterality: N/A;    CYST REMOVAL      FOREHEAD, SEBACEOUS CYST    LUMBAR LAMINECTOMY WITH FUSION N/A 2025    Procedure: LUMBAR FUSION DECOMPRESSION WITH PEDICLE SCREWS L4-5;  Surgeon: Yang Whitley MD;  Location: [facility];  Service: Neurosurgery;  Laterality: N/A;    MOUTH SURGERY      Oral extractions    ORIF WRIST FRACTURE Right       General Information       Row Name 25 1125          Physical Therapy Time and  Intention    Document Type evaluation  -SC     Mode of Treatment physical therapy  -SC       Row Name 06/24/25 1125          General Information    Patient Profile Reviewed yes  -SC     Prior Level of Function independent:;gait;transfer;all household mobility  stated he does not use an AD  -SC     Existing Precautions/Restrictions fall;spinal;other (see comments)  tachycardia  -SC     Barriers to Rehab none identified  -SC       Row Name 06/24/25 1125          Living Environment    Current Living Arrangements home  -SC     People in Home spouse  -SC       Row Name 06/24/25 1125          Home Main Entrance    Number of Stairs, Main Entrance two  -SC     Stair Railings, Main Entrance none  -SC       Row Name 06/24/25 1125          Stairs Within Home, Primary    Number of Stairs, Within Home, Primary none  -SC       Row Name 06/24/25 1125          Cognition    Orientation Status (Cognition) oriented to;person;verbal cues/prompts needed for orientation;place;situation;time  -SC       Row Name 06/24/25 1125          Safety Issues/Impairments Affecting Functional Mobility    Impairments Affecting Function (Mobility) balance  -SC     Comment, Safety Issues/Impairments (Mobility) sleepy, following commands, confused about his situation  -SC               User Key  (r) = Recorded By, (t) = Taken By, (c) = Cosigned By      Initials Name Provider Type    SC Jose Mars PT Physical Therapist                   Mobility       Row Name 06/24/25 1127          Bed Mobility    Bed Mobility supine-sit;scooting/bridging  -SC     Scooting/Bridging [place] (Bed Mobility) modified independence;verbal cues  -SC     Supine-Sit [place] (Bed Mobility) modified independence;verbal cues  -SC     Assistive Device (Bed Mobility) head of bed elevated;bed rails  -SC     Comment, (Bed Mobility) cues for log rolling to get up oob  -SC       Row Name 06/24/25 1127          Transfers    Comment, (Transfers) Demonstrated good control of  transfers  -SC       Row Name 06/24/25 1127          Sit-Stand Transfer    Sit-Stand [place] (Transfers) 1 person assist;contact guard  -SC     Assistive Device (Sit-Stand Transfers) walker, front-wheeled  -[facility] Name 06/24/25 1127          Gait/Stairs (Locomotion)    [place] Level (Gait) 1 person assist;contact guard;verbal cues  -SC     Assistive Device (Gait) walker, front-wheeled  -SC     Distance in Feet (Gait) 30  -SC     Deviations/Abnormal Patterns (Gait) base of support, wide  -SC     Bilateral Gait Deviations weight shift ability decreased  -SC     Comment, (Gait/Stairs) able to control walker safely for short distance. Further ambulation limited by tachycardia and some dizziness  -SC               User Key  (r) = Recorded By, (t) = Taken By, (c) = Cosigned By      Initials Name Provider Type    SC Jose Mars, PT Physical Therapist                   Obj/Interventions       Row Name 06/24/25 1130          Strength Comprehensive (MMT)    General Manual Muscle Testing (MMT) Assessment no strength deficits identified  -SC     Comment, General Manual Muscle Testing (MMT) Assessment B LE grossly 4+/5 quads and tib ant  -[facility] Name 06/24/25 1130          Motor Skills    Motor Skills coordination  -SC     Coordination gross motor deficit;minimal impairment  tremors noted in UE and LE  -SC     Therapeutic Exercise hip  -[facility] Name 06/24/25 1130          Hip (Therapeutic Exercise)    Hip (Therapeutic Exercise) AROM (active range of motion)  -SC     Hip AROM (Therapeutic Exercise) bilateral;flexion;extension;aBduction;aDduction;10 repetitions;supine  -[facility] Name 06/24/25 1130          Balance    Balance Assessment standing dynamic balance  -SC     Dynamic Standing Balance 1-person assist;contact guard  -SC     Position/Device Used, Standing Balance supported;walker, rolling  -SC     Comment, Balance no buckling or LOB  -[facility] Name 06/24/25 1130          Sensory  Assessment (Somatosensory)    Sensory Assessment (Somatosensory) sensation intact  -SC               User Key  (r) = Recorded By, (t) = Taken By, (c) = Cosigned By      Initials Name Provider Type    SC Jose Mars, PT Physical Therapist                   Goals/Plan       Row Name 06/24/25 1135          Bed Mobility Goal 1 (PT)    Activity/Assistive Device (Bed Mobility Goal 1, PT) sit to supine/supine to sit  -SC     [place] Level/Cues Needed (Bed Mobility Goal 1, PT) modified independence  -SC     Time Frame (Bed Mobility Goal 1, PT) long term goal (LTG);3 days  -SC       Row Name 06/24/25 1135          Transfer Goal 1 (PT)    Activity/Assistive Device (Transfer Goal 1, PT) sit-to-stand/stand-to-sit;walker, rolling  -SC     [place] Level/Cues Needed (Transfer Goal 1, PT) modified independence  -SC     Time Frame (Transfer Goal 1, PT) long term goal (LTG);3 days  -SC       Row Name 06/24/25 1135          Gait Training Goal 1 (PT)    Activity/Assistive Device (Gait Training Goal 1, PT) gait (walking locomotion);walker, rolling  -SC     [place] Level (Gait Training Goal 1, PT) modified independence  -SC     Distance (Gait Training Goal 1, PT) 500  -SC     Time Frame (Gait Training Goal 1, PT) long term goal (LTG);3 days  -SC       Row Name 06/24/25 1135          Stairs Goal 1 (PT)    Activity/Assistive Device (Stairs Goal 1, PT) stairs, all skills  -SC     [place] Level/Cues Needed (Stairs Goal 1, PT) contact guard required  -SC     Number of Stairs (Stairs Goal 1, PT) 2  -SC     Time Frame (Stairs Goal 1, PT) long term goal (LTG);10 days  -SC       Row Name 06/24/25 1135          Patient Education Goal (PT)    Activity (Patient Education Goal, PT) know back precautions  -SC     [place]/Cues/Accuracy (Memory Goal 2, PT) demonstrates adequately  -SC     Time Frame (Patient Education Goal, PT) long term goal (LTG);10 days  -SC       Row Name 06/24/25 1135          Therapy Assessment/Plan  (PT)    Planned Therapy Interventions (PT) balance training;bed mobility training;gait training;home exercise program;ROM (range of motion);patient/family education;stair training;strengthening;stretching;transfer training  -SC               User Key  (r) = Recorded By, (t) = Taken By, (c) = Cosigned By      Initials Name Provider Type    SC Jose Mars, PT Physical Therapist                   Clinical Impression       [facility] Name 06/24/25 1132          Pain    Pretreatment Pain Rating 0/10 - no pain  -SC     Posttreatment Pain Rating 3/10  -SC     Pain Location back  -[facility] Name 06/24/25 1132          Plan of Care Review    Plan of Care Reviewed With patient  -SC     Outcome Evaluation Patient remains sllightly confused and lethergic. His overal strength and pain control is good. Some tremors noted in UEs and LEs. Ambulation limited by tachycardia. Recommend continued skilled PT and home with assistance at discharge.  -[facility] Name 06/24/25 1132          Therapy Assessment/Plan (PT)    Patient/Family Therapy Goals Statement (PT) go home  -SC     Rehab Potential (PT) good  -SC     Criteria for Skilled Interventions Met (PT) yes;meets criteria  -SC     Therapy Frequency (PT) daily  -SC     Predicted Duration of Therapy Intervention (PT) 3  -[facility] Name 06/24/25 1132          Vital Signs    Pre Systolic BP Rehab 156  -SC     Pre Treatment Diastolic BP 94  in bed  -SC     Intra Systolic BP Rehab 122  -SC     Intra Treatment Diastolic BP 89  standing  -SC     Pretreatment Heart Rate (beats/min) 116  -SC     Intratreatment Heart Rate (beats/min) 126  -SC     Posttreatment Heart Rate (beats/min) 133  -[facility] Name 06/24/25 1132          Positioning and Restraints    Pre-Treatment Position in bed  -SC     Post Treatment Position chair  -SC     In Chair notified nsg;reclined;sitting;call light within reach;encouraged to call for assist;exit alarm on;with family/caregiver  -SC               User Key   (r) = Recorded By, (t) = Taken By, (c) = Cosigned By      Initials Name Provider Type    Jose Schuler PT Physical Therapist                   Outcome Measures       Row Name 06/24/25 1136 06/24/25 0855       How much help from another person do you currently need...    Turning from your back to your side while in flat bed without using bedrails? -- 3  -BC    Moving from lying on back to sitting on the side of a flat bed without bedrails? 3  -SC 2  -BC    Moving to and from a bed to a chair (including a wheelchair)? 3  -SC 2  -BC    Standing up from a chair using your arms (e.g., wheelchair, bedside chair)? 3  -SC 2  -BC    Climbing 3-5 steps with a railing? 3  -SC 1  -BC    To walk in hospital room? 3  -SC 2  -BC    AM-PAC 6 Clicks Score (PT) -- 12  -BC    Highest Level of Mobility Goal -- Move to Chair/Commode-4  -BC      Row Name 06/24/25 0013          How much help from another person do you currently need...    Turning from your back to your side while in flat bed without using bedrails? 3  -DB     Moving from lying on back to sitting on the side of a flat bed without bedrails? 2  -DB     Moving to and from a bed to a chair (including a wheelchair)? 2  -DB     Standing up from a chair using your arms (e.g., wheelchair, bedside chair)? 2  -DB     Climbing 3-5 steps with a railing? 1  -DB     To walk in hospital room? 2  -DB     AM-PAC 6 Clicks Score (PT) 12  -DB     Highest Level of Mobility Goal Move to Chair/Commode-4  -DB       Row Name 06/24/25 1136          Functional Assessment    Outcome Measure Options AM-PAC 6 Clicks Basic Mobility (PT)  -SC               User Key  (r) = Recorded By, (t) = Taken By, (c) = Cosigned By      Initials Name Provider Type    Jose Schuler, PT Physical Therapist    BC Ursula Spencer, RN Registered Nurse    Mikayla Perez RN Registered Nurse                                 Physical Therapy Education       Title: PT OT SLP Therapies (Done)       Topic: Physical  Therapy (Done)       Point: Mobility training (Done)       Learning Progress Summary            Patient Eager, E, VU,NR by SC at 6/24/2025 1137    Comment: reviewed HEP                      Point: Home exercise program (Done)       Learning Progress Summary            Patient Eager, E, VU,NR by SC at 6/24/2025 1137    Comment: reviewed HEP                      Point: Body mechanics (Done)       Learning Progress Summary            Patient Eager, E, VU,NR by SC at 6/24/2025 1137    Comment: reviewed HEP                      Point: Precautions (Done)       Learning Progress Summary            Patient Eager, E, VU,NR by SC at 6/24/2025 1137    Comment: reviewed HEP                                      User Key       Initials Effective Dates Name Provider Type Discipline    SC 02/03/23 -  Jose Mars, PT Physical Therapist PT                  PT Recommendation and Plan  Planned Therapy Interventions (PT): balance training, bed mobility training, gait training, home exercise program, ROM (range of motion), patient/family education, stair training, strengthening, stretching, transfer training  Outcome Evaluation: Patient remains sllightly confused and lethergic. His overal strength and pain control is good. Some tremors noted in UEs and LEs. Ambulation limited by tachycardia. Recommend continued skilled PT and home with assistance at discharge.     Time Calculation:   PT Evaluation Complexity  History, PT Evaluation Complexity: 3 or more personal factors and/or comorbidities  Examination of Body Systems (PT Eval Complexity): total of 4 or more elements  Clinical Presentation (PT Evaluation Complexity): evolving  Clinical Decision Making (PT Evaluation Complexity): moderate complexity  Overall Complexity (PT Evaluation Complexity): moderate complexity     PT Charges       Row Name 06/24/25 0953             Time Calculation    Start Time 0945  -SC      PT Received On 06/24/25  -SC      PT Goal Re-Cert Due Date 07/04/25   -SC         Timed Charges    28397 - PT Therapeutic Activity Minutes 15  -SC         Untimed Charges    PT Eval/Re-eval Minutes 45  -SC         Total Minutes    Timed Charges Total Minutes 15  -SC      Untimed Charges Total Minutes 45  -SC       Total Minutes 60  -SC                User Key  (r) = Recorded By, (t) = Taken By, (c) = Cosigned By      Initials Name Provider Type    SC Jerad, Jose LEMOS, PT Physical Therapist                  Therapy Charges for Today       Code Description Service Date Service Provider Modifiers Qty    17237862664 HC PT THERAPEUTIC ACT EA 15 MIN 6/24/2025 Jose Mars, PT GP 1    31639755919 HC PT EVAL MOD COMPLEXITY 3 6/24/2025 Jose Mars, PT GP 1            PT G-Codes  Outcome Measure Options: AM-PAC 6 Clicks Basic Mobility (PT)  AM-PAC 6 Clicks Score (PT): 12  PT Discharge Summary  Anticipated Discharge Disposition (PT): home with assist    Jose Mars, PT  6/24/2025

## 2025-06-24 NOTE — H&P
[facility] Medicine Services  HISTORY AND PHYSICAL    Patient Name: Bonilla Sterling Jr.  : 1960  MRN: [id number]  Primary Care Physician: Christin Yeh APRN  Date of admission: 2025      Subjective   Subjective     Chief Complaint:  Lower back pain, hallucinations, lower extremity weakness    HPI:  Bonilla Sterling Jr. is a 65 y.o. male who had lumbar spine surgery a week ago, specifically lumbar fusion at L4-5 due to unstable spondylolisthesis.  He states that he has been taking his prescribed postoperative medications and doing PT, and was progressing well over the last few days, but for the last 48 hours he has noticed he can do very little in terms of activity, stating that because of bilateral lower extremity weakness he now needs assistance getting to a standing position, and has difficulty ambulating.  He states he has had increased lumbar pain with some radiation down the posterior aspects of both thighs.  No bowel or bladder incontinence, no saddle anesthesia.  He states he has not had a bowel movement postoperatively until he arrived in the hospital, and had 1 earlier while he was here (no rachel blood in the stool).  He also confirms decreased oral intake of food and fluids over the last week due to decreased appetite.  He states that because of some postop pain, he took the prescribed Percocet but began to have some visual hallucinations; in order to avoid Percocet because of this, he took a THC gummy on Saturday which made the hallucinations worse.  He states he called his physician who instructed him to come to the ED for further evaluation.  He denies any chest pain, shortness of breath, fever/chills, nausea/emesis, abdominal pain, diarrhea, unilateral weakness, dizziness/lightheadedness, or syncope.  His medical history is significant for hypertension, hyperlipidemia, gout, Alzheimer's dementia, anxiety/depression, GERD, and sleep apnea (he does not  use CPAP).      Personal History     Past Medical History:   Diagnosis Date    Acid reflux     Anxiety     Arthritis 1990    OSTEO    Cervical disc disorder 1980    Bulging disc    Chronic pain disorder     Back     CTS (carpal tunnel syndrome) 2023    Elevated cholesterol     Gout     HL (hearing loss) 2022    Hypertension     Low back pain     Lumbosacral disc disease 1980    Bulging disc    Memory loss     Have an appointment to confirm    Peripheral neuropathy     Sinus headache     Sleep apnea     DOES NOT USE CPAP    Thoracic disc disorder 1980    Bulging disc    Vision loss     Wears glasses     Wears partial dentures     Full upper plate - advised no adhesives DOS           Past Surgical History:   Procedure Laterality Date    CARPAL TUNNEL RELEASE Bilateral     COLONOSCOPY  2009    COLONOSCOPY N/A 08/17/2020    Procedure: COLONOSCOPY;  Surgeon: Chantale Naik MD;  Location: [facility] ENDOSCOPY;  Service: Gastroenterology;  Laterality: N/A;    CYST REMOVAL      FOREHEAD, SEBACEOUS CYST    LUMBAR LAMINECTOMY WITH FUSION N/A 6/16/2025    Procedure: LUMBAR FUSION DECOMPRESSION WITH PEDICLE SCREWS L4-5;  Surgeon: Yang Whitley MD;  Location: [facility] OR;  Service: Neurosurgery;  Laterality: N/A;    MOUTH SURGERY      Oral extractions    ORIF WRIST FRACTURE Right        Family History: family history includes Alzheimer's disease in his father and mother; Arthritis in his mother; Breast cancer in his mother; Dementia in his father and mother; Diabetes in his father and mother; Hyperlipidemia in his mother; Hypertension in his father and mother; No Known Problems in his brother, brother, brother, sister, sister, sister, sister, and sister.     Social History:  reports that he quit smoking about 16 years ago. His smoking use included cigarettes. He started smoking about 36 years ago. He has a 20 pack-year smoking history. He has been exposed to tobacco smoke. He has never used smokeless tobacco. He reports  that he does not currently use alcohol. He reports that he does not use drugs.  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  DULoxetine, allopurinol, amitriptyline, atorvastatin, donepezil, hydroCHLOROthiazide, lisinopril, multivitamin with minerals, oxyCODONE-acetaminophen, and vitamin C    No Known Allergies    Objective   Objective     Vital Signs:   Temp:  [98.2 °F (36.8 °C)] 98.2 °F (36.8 °C)  Heart Rate:  [] 111  Resp:  [16] 16  BP: (101-139)/() 108/87       Physical Exam   Constitutional: Awake, alert, NAD, pleasant.  Eyes: PERRLA, sclerae anicteric, no conjunctival injection  HENT: NCAT, mucous membranes dry.  Neck: Supple, no thyromegaly, no lymphadenopathy, trachea midline  Respiratory: Clear to auscultation bilaterally, nonlabored respirations   Cardiovascular: Tachycardic on my exam with rate 110, regular rhythm, no murmurs, rubs, or gallops, palpable pedal pulses bilaterally  Gastrointestinal: Positive bowel sounds, soft, nontender, mild distention, no peritoneal signs  Musculoskeletal: No bilateral ankle edema, no clubbing or cyanosis to extremities  Psychiatric: Appropriate affect, cooperative  Neurologic: Oriented x 3, strength symmetric in all extremities on my exam, bilateral patellar DTRs 2+, Cranial Nerves grossly intact to confrontation, speech clear  Skin: No rashes, normal turgor.    Result Review:  I have personally reviewed the results from the time of this admission to 6/23/2025 21:51 EDT and agree with these findings:  [x]  Laboratory list / accordion  []  Microbiology  [x]  Radiology  [x]  EKG/Telemetry   []  Cardiology/Vascular   []  Pathology  []  Old records  []  Other:  Most notable findings include: Reviewed radiology report from CT head, MRI lumbar spine.  I reviewed EKG which by my read shows sinus rhythm, ventricular just  under 100 bpm, normal axis, no acute appearing ST/T wave changes.  I reviewed chest x-ray which is a single AP window my read shows nothing acute.  Also reviewed radiology reports from x-ray abdomen and x-ray L-spine.      LAB RESULTS:      Lab 06/23/25  1502 06/17/25  2045   WBC 11.45*  --    HEMOGLOBIN 13.3 12.6*   HEMATOCRIT 41.4 41.0   PLATELETS 307  --    NEUTROS ABS 8.20*  --    IMMATURE GRANS (ABS) 0.04  --    LYMPHS ABS 2.23  --    MONOS ABS 0.87  --    EOS ABS 0.08  --    MCV 84.0  --    CRP 17.09*  --    PROCALCITONIN 0.11  --    LACTATE 1.5  --          Lab 06/23/25  1502   SODIUM 135*   POTASSIUM 4.2   CHLORIDE 94*   CO2 28.0   ANION GAP 13.0   BUN 16.8   CREATININE 1.26   EGFR 62.5   GLUCOSE 108*   CALCIUM 9.7   MAGNESIUM 2.0   PHOSPHORUS 3.7   TSH 1.170         Lab 06/23/25  1502   TOTAL PROTEIN 8.3   ALBUMIN 4.0   GLOBULIN 4.3   ALT (SGPT) 27   AST (SGOT) 33   BILIRUBIN 0.5   ALK PHOS 99                     UA          6/23/2025    15:17   Urinalysis   Specific [place], UA 1.016    Ketones, UA Trace    Blood, UA Negative    Leukocytes, UA Negative    Nitrite, UA Negative        Microbiology Results (last 10 days)       Procedure Component Value - Date/Time    COVID PRE-OP / PRE-PROCEDURE SCREENING ORDER (NO ISOLATION) - Swab, Nasopharynx [[id number]]  (Normal) Collected: 06/23/25 1517    Lab Status: Final result Specimen: Swab from Nasopharynx Updated: 06/23/25 1627    Narrative:      The following orders were created for panel order COVID PRE-OP / PRE-PROCEDURE SCREENING ORDER (NO ISOLATION) - Swab, Nasopharynx.  Procedure                               Abnormality         Status                     ---------                               -----------         ------                     COVID-19, FLU A/B, RSV P...[[id number]]  Normal              Final result                 Please view results for these tests on the individual orders.    COVID-19, FLU A/B, RSV PCR 1 HR TAT - Swab, Nasopharynx [[id number]]   (Normal) Collected: 06/23/25 1517    Lab Status: Final result Specimen: Swab from Nasopharynx Updated: 06/23/25 1627     COVID19 Not Detected     Influenza A PCR Not Detected     Influenza B PCR Not Detected     RSV, PCR Not Detected    Narrative:      Fact sheet for providers: https://www.fda.gov/media/629277/download    Fact sheet for patients: https://www.fda.gov/media/555779/download    Test performed by PCR.            XR Abdomen KUB  Result Date: 6/23/2025  XR ABDOMEN KUB, XR SPINE LUMBAR 2 OR 3 VW Date of Exam: 6/23/2025 7:28 PM EDT Indication: postoperative constipation Comparison: Lumbar spine MRI 6/23/2025 Findings: Moderate colonic stool. No dilated loops of bowel to suggest ileus or obstruction. No significant gastric distention. No appreciable renal calcifications. Mild to moderate degenerative osteoarthritis of the hip joints. L4-L5 spinal fusion and discectomy. No visualized hardware fracture or signs of loosening. No visualized displaced fracture or vertebral body height loss. Minimal grade 1 anterolisthesis L4 and L5 stable from prior. Mild lumbar dextrocurvature possibly positional. Residual degenerative disc disease and facet arthropathy better assessed on MRI performed same day. Abdominal aortic atherosclerotic disease. Degenerative osteoarthritis of the SI joints.     Impression: Impression: 1. Moderate formed colonic stool without obstruction. 2. Radiographically uncomplicated L4-L5 fusion hardware. Spondylotic change better assessed on same day lumbar spine MRI. Electronically Signed: Bethel Sanchez MD  6/23/2025 8:03 PM EDT  Workstation ID: [id number]    XR Spine Lumbar 2 or 3 View  Result Date: 6/23/2025  XR ABDOMEN KUB, XR SPINE LUMBAR 2 OR 3 VW Date of Exam: 6/23/2025 7:28 PM EDT Indication: postoperative constipation Comparison: Lumbar spine MRI 6/23/2025 Findings: Moderate colonic stool. No dilated loops of bowel to suggest ileus or obstruction. No significant gastric distention. No  appreciable renal calcifications. Mild to moderate degenerative osteoarthritis of the hip joints. L4-L5 spinal fusion and discectomy. No visualized hardware fracture or signs of loosening. No visualized displaced fracture or vertebral body height loss. Minimal grade 1 anterolisthesis L4 and L5 stable from prior. Mild lumbar dextrocurvature possibly positional. Residual degenerative disc disease and facet arthropathy better assessed on MRI performed same day. Abdominal aortic atherosclerotic disease. Degenerative osteoarthritis of the SI joints.     Impression: Impression: 1. Moderate formed colonic stool without obstruction. 2. Radiographically uncomplicated L4-L5 fusion hardware. Spondylotic change better assessed on same day lumbar spine MRI. Electronically Signed: Bethel Sanchez MD  6/23/2025 8:03 PM EDT  Workstation ID: [id number]    MRI Lumbar Spine With & Without Contrast  Result Date: 6/23/2025  MRI LUMBAR SPINE W WO CONTRAST Date of Exam: 6/23/2025 5:26 PM EDT Indication: Worsening back pain and weakness, encephalopathy following 6/16 fusion.  Comparison: Lumbar spine CT 4/15/2025. MR lumbar spine 3/6/2025. Technique:  Routine multiplanar/multisequence sequence images of the lumbar spine were obtained before and after the uneventful administration of 20 mL Multihance.  Findings: Study is degraded by motion artifact. Within technical limitation: Vertebral numbering: The last well formed disc is labeled L5-S1. Alignment: There is straightening of normal lumbar lordosis. Vertebrae: Posterior decompression with instrumented and interbody fusion at L4-5 with associated hardware artifacts which limits evaluation of adjacent structures, including adjacent vertebral bodies and spinal canal. Multilevel degenerative endplate changes, including Modic type II/fatty endplate degenerative change at L5-S1.. Vertebral body heights are otherwise maintained.No focal suspicious appearing marrow lesions or bone marrow edema  within limitation of hardware artifacts. Discs and spinal canal: Multilevel disc desiccation and disc height loss. Degenerative changes detailed below by level.. Spinal cord and cauda equina: The visible spinal cord has normal signal and morphology. No cauda equina compression. The conus tip lies at L2 level. Adjacent soft tissues: Posterior paraspinal soft tissue edema, without organized fluid collection seen, likely postoperative. After intravenous contrast, no convincing abnormal enhancement, noting hardware artifacts precludes accurate evaluation on postcontrast sequences from L3-4 through L4-5 level. Findings by level: T11-12: No significant interval change.. No significant posterior disc bulge.. Mild bilateral facet arthropathy. No significant spinal canal or foraminal stenosis. T12-L1: No significant interval change. No significant posterior disc bulge. Mild to moderate bilateral facet arthropathy and ligamentum flavum thickening. No significant spinal canal or foraminal stenosis. L1-2: No significant interval change. No significant posterior disc bulge. Moderate bilateral facet arthropathy with ligamentum flavum thickening. No significant spinal canal or foraminal stenosis. L2-3: No significant interval change. Mild diffuse disc bulge. Moderate bilateral facet arthropathy with ligamentum flavum thickening. No significant spinal canal or foraminal stenosis. L3-4: No significant interval change. Diffuse disc bulge with osteophytic ridging. Severe bilateral facet arthropathy with ligamentum flavum thickening. Mild spinal canal stenosis. Moderate bilateral foraminal narrowing. L4-5: Interval postoperative changes. Interval improved spinal canal stenosis. Spinal canal appears grossly patent. Bilateral facet arthropathy. Severe left greater than right foraminal narrowing. L5-S1: No significant interval change. Osteophytic ridging. Severe bilateral facet arthropathy.. No significant spinal canal stenosis. Moderate  to severe bilateral foraminal narrowing.     Impression: Impression: Study is degraded by motion artifact. Interval postoperative changes at L4-5 with improved spinal canal stenosis at this level. Posterior paraspinal soft tissue edema without organized collection, favored postoperative. Otherwise, no significant interval change in multilevel degenerative changes in the lumbar spine. Electronically Signed: Dimitris Narvaez MD  6/23/2025 6:08 PM EDT  Workstation ID: [id number]    CT Head Without Contrast  Result Date: 6/23/2025  CT HEAD WO CONTRAST Date of Exam: 6/23/2025 3:53 PM EDT Indication: altered mental status, suspect metabolic. Comparison: Head CT 4/20/2025. Brain MRI 9/11/2024. Technique: Axial CT images were obtained of the head without contrast administration.  Automated exposure control and iterative construction methods were used. Findings: No acute intracranial hemorrhage.Intact appearing gray-white differentiation.No extra-axial fluid collection.No significant mass effect. No hydrocephalus. Mild generalized parenchymal volume loss. Scattered areas of periventricular and subcortical white matter hypoattenuation, nonspecific, perhaps from small vessel ischemic/hypertensive changes in a patient of this age.There are intracranial atherosclerotic calcifications. Mild scattered mucosal thickening in the paranasal sinuses.Mastoid air cells are essentially clear.Included globes and orbits appear unremarkable by CT. No acute or aggressive appearing bony or extracranial soft tissue process.     Impression: Impression: No acute intracranial findings. Electronically Signed: Dimitris Narvaez MD  6/23/2025 4:04 PM EDT  Workstation ID: [id number]    XR Chest 1 View  Result Date: 6/23/2025  XR CHEST 1 VW Date of Exam: 6/23/2025 3:25 PM EDT Indication: Altered mental status, infectious work-up Comparison: None available. Findings: The cardiomediastinal silhouette is within normal limits. Pulmonary vascularity appears  normal. There is no acute airspace consolidation, pleural effusion, or pneumothorax. There are degenerative changes of the thoracic spine.     Impression: Impression: No acute cardiopulmonary abnormality. Electronically Signed: Keven Muir MD  6/23/2025 3:39 PM EDT  Workstation ID: [id number]          Assessment & Plan   Assessment & Plan       Recurrent low back pain      65M with postoperative lumbar spine pain, also constipation, as well as visual hallucinations secondary to medications    Postoperative lumbar spine pain  Patient hallucinations secondary to medications/THC  - Pain control with oral agents, IV if needed.  - PT/OT to eval and treat.  - Reviewed neurosurgery note, will follow-up any further recommendations.    Constipation  - IVF with 0.9 NS, as he is clinically dry and has had decreased oral intake postop.  - Bowel regimen ordered as needed.  - Hold HCTZ while receiving IV fluids.    Hypertension  - Continue lisinopril from home regimen, hold HCTZ while receiving IV fluids.    Hyperlipidemia  - Continue statin from home regimen.    Anxiety/depression  - Continue Cymbalta and Elavil from home regimen.    Gout  - Continue allopurinol.  - Not currently in exacerbation/acute flare.    Alzheimer's dementia  - Continue Aricept and aforementioned antidepressants.    GERD  - No therapy for this listed on his home/complete reviewed medication list, will add daily PPI.    Sleep apnea  - He does not use CPAP at home, declines to use while here.          Total time spent: 80 minutes  Time spent includes time reviewing chart, face-to-face time, counseling patient/family/caregiver, ordering medications/tests/procedures, communicating with other health care professionals, documenting clinical information in the electronic health record, and coordination of care.     VTE Prophylaxis:  Mechanical VTE prophylaxis orders are present.          CODE STATUS:  full  Code Status and Medical Interventions: CPR (Attempt  to Resuscitate); Full Support   Ordered at: 06/23/25 2049     Code Status (Patient has no pulse and is not breathing):    CPR (Attempt to Resuscitate)     Medical Interventions (Patient has pulse or is breathing):    Full Support     Level Of Support Discussed With:    Patient       Expected Discharge   tbezequiel Pierre,MAY, DO  06/23/25

## 2025-06-24 NOTE — PLAN OF CARE
Problem: Adult Inpatient Plan of Care  Goal: Absence of Hospital-Acquired Illness or Injury  Intervention: Identify and Manage Fall Risk  Recent Flowsheet Documentation  Taken 6/24/2025 0400 by Mikayla Uribe RN  Safety Promotion/Fall Prevention:   activity supervised   clutter free environment maintained   gait belt   lighting adjusted   nonskid shoes/slippers when out of bed   room organization consistent   safety round/check completed   toileting scheduled  Taken 6/24/2025 0200 by Mikayla Uribe RN  Safety Promotion/Fall Prevention:   activity supervised   clutter free environment maintained   fall prevention program maintained   lighting adjusted   nonskid shoes/slippers when out of bed   room organization consistent   safety round/check completed   toileting scheduled  Taken 6/24/2025 0013 by Mikayla Uribe RN  Safety Promotion/Fall Prevention:   activity supervised   assistive device/personal items within reach   clutter free environment maintained   fall prevention program maintained   gait belt   nonskid shoes/slippers when out of bed   room organization consistent   safety round/check completed   toileting scheduled  Intervention: Prevent Skin Injury  Recent Flowsheet Documentation  Taken 6/24/2025 0400 by Mikayla Uribe RN  Body Position: position changed independently  Skin Protection: incontinence pads utilized  Taken 6/24/2025 0200 by Mikayla Uribe RN  Body Position: position changed independently  Skin Protection: incontinence pads utilized  Taken 6/24/2025 0013 by Mikayla Uribe RN  Skin Protection: incontinence pads utilized  Intervention: Prevent and Manage VTE (Venous Thromboembolism) Risk  Recent Flowsheet Documentation  Taken 6/24/2025 0400 by Mikayla Uribe RN  VTE Prevention/Management: SCDs (sequential compression devices) on  Intervention: Prevent Infection  Recent Flowsheet Documentation  Taken 6/24/2025 0400 by Mikayla Uribe RN  Infection Prevention:   environmental surveillance  performed   hand hygiene promoted   single patient room provided   rest/sleep promoted  Taken 6/24/2025 0200 by Mikayla Uribe RN  Infection Prevention:   environmental surveillance performed   hand hygiene promoted   rest/sleep promoted   single patient room provided  Taken 6/24/2025 0013 by Mikayla Uribe RN  Infection Prevention:   equipment surfaces disinfected   hand hygiene promoted   rest/sleep promoted   single patient room provided  Goal: Optimal Comfort and Wellbeing  Intervention: Monitor Pain and Promote Comfort  Recent Flowsheet Documentation  Taken 6/24/2025 0200 by Mikayla Uribe RN  Pain Management Interventions:   quiet environment facilitated   relaxation techniques promoted   position adjusted   pillow support provided   pain pump in use  Intervention: Provide Person-Centered Care  Recent Flowsheet Documentation  Taken 6/24/2025 0013 by Mikayla Uribe RN  Trust Relationship/Rapport:   care explained   choices provided   emotional support provided   empathic listening provided  Goal: Readiness for Transition of Care  Intervention: Mutually Develop Transition Plan  Recent Flowsheet Documentation  Taken 6/24/2025 0013 by Mikayla Uribe RN  Equipment Currently Used at Home: none  Transportation Anticipated: family or friend will provide  Transportation Concerns: none  Patient/Family Anticipated Services at Transition:      rehabilitation services  Patient/Family Anticipates Transition to:   home with family   inpatient rehabilitation facility   Goal Outcome Evaluation:Patient is alert and oriented. Patient was able to stand at bedside and void. Pain is currently controlled. Patient is asleep, call light in reach

## 2025-06-24 NOTE — PLAN OF CARE
Goal Outcome Evaluation:  Plan of Care Reviewed With: patient           Outcome Evaluation: Alert oriented x2. Pain controlled with PO pain medication. Voiding per urinal. Ambulated to chair x1 assist.

## 2025-06-24 NOTE — THERAPY EVALUATION
Patient Name: Bonilla Sterling Jr.  : 1960    MRN: [id number]                              Today's Date: 2025       Admit Date: 2025    Visit Dx:     ICD-10-CM ICD-9-CM   1. Encephalopathy acute  G93.40 348.30   2. Postoperative pain  G89.18 338.18   3. Polypharmacy  Z79.899 V58.69   4. Constipation, unspecified constipation type  K59.00 564.00     Patient Active Problem List   Diagnosis    Hypertension    Gastroesophageal reflux disease without esophagitis    Bulging of lumbar intervertebral disc    Lumbar stenosis with neurogenic claudication    Recurrent low back pain     Past Medical History:   Diagnosis Date    Acid reflux     Anxiety     Arthritis     OSTEO    Cervical disc disorder     Bulging disc    Chronic pain disorder     Back     CTS (carpal tunnel syndrome)     Elevated cholesterol     Gout     HL (hearing loss)     Hypertension     Low back pain     Lumbosacral disc disease     Bulging disc    Memory loss     Have an appointment to confirm    Peripheral neuropathy     Sinus headache     Sleep apnea     DOES NOT USE CPAP    Thoracic disc disorder     Bulging disc    Vision loss     Wears glasses     Wears partial dentures     Full upper plate - advised no adhesives DOS     Past Surgical History:   Procedure Laterality Date    CARPAL TUNNEL RELEASE Bilateral     COLONOSCOPY  2009    COLONOSCOPY N/A 2020    Procedure: COLONOSCOPY;  Surgeon: Chantale Naik MD;  Location: [facility] ENDOSCOPY;  Service: Gastroenterology;  Laterality: N/A;    CYST REMOVAL      FOREHEAD, SEBACEOUS CYST    LUMBAR LAMINECTOMY WITH FUSION N/A 2025    Procedure: LUMBAR FUSION DECOMPRESSION WITH PEDICLE SCREWS L4-5;  Surgeon: Yang Whitley MD;  Location: [facility];  Service: Neurosurgery;  Laterality: N/A;    MOUTH SURGERY      Oral extractions    ORIF WRIST FRACTURE Right       General Information       Row Name 25 1126          OT Time and Intention    Document  Type evaluation  -     Mode of Treatment occupational therapy  -     Patient Effort good  -       Row Name 06/24/25 1126          General Information    Patient Profile Reviewed yes  -     Prior Level of Function independent:;all household mobility;transfer;ADL's  -     Existing Precautions/Restrictions fall;spinal;other (see comments)  Post op 14 days  -     Barriers to Rehab cognitive status;previous functional deficit;medically complex  -       Row Name 06/24/25 1126          Living Environment    Current Living Arrangements home  -     People in Home spouse  -       Row Name 06/24/25 1126          Home Main Entrance    Number of Stairs, Main Entrance two  -     Stair Railings, Main Entrance none  -       Row Name 06/24/25 1126          Stairs Within Home, Primary    Number of Stairs, Within Home, Primary none  -       Row Name 06/24/25 1126          Cognition    Orientation Status (Cognition) oriented to;person;verbal cues/prompts needed for orientation;place;time  -       Row Name 06/24/25 1126          Safety Issues/Impairments Affecting Functional Mobility    Safety Issues Affecting Function (Mobility) awareness of need for assistance;insight into deficits/self-awareness;safety precaution awareness;safety precautions follow-through/compliance;sequencing abilities  -     Impairments Affecting Function (Mobility) balance;cognition;endurance/activity tolerance;postural/trunk control  -     Cognitive Impairments, Mobility Safety/Performance awareness, need for assistance;insight into deficits/self-awareness;safety precaution awareness;safety precaution follow-through;judgment  -     Comment, Safety Issues/Impairments (Mobility) Intermittent confusion  -               User Key  (r) = Recorded By, (t) = Taken By, (c) = Cosigned By      Initials Name Provider Type     Areli Guajardo OT Occupational Therapist                     Mobility/ADL's       Row Name 06/24/25 113           Bed Mobility    Bed Mobility scooting/bridging;supine-sit  -     Scooting/Bridging [place] (Bed Mobility) minimum assist (75% patient effort);verbal cues  -     Supine-Sit [place] (Bed Mobility) minimum assist (75% patient effort);verbal cues  -     Comment, (Bed Mobility) VC's to sequence log toll technique to transition from supine to sit EOB.  -       Row Name 06/24/25 1130          Transfers    Transfers sit-stand transfer  -     Comment, (Transfers) VC's for hand placement and sequencing.  -       Row Name 06/24/25 1130          Sit-Stand Transfer    Sit-Stand [place] (Transfers) contact guard;verbal cues  -     Assistive Device (Sit-Stand Transfers) walker, front-wheeled  -       Row Name 06/24/25 1130          Activities of Daily Living    BADL Assessment/Intervention lower body dressing;grooming  -[facility] Name 06/24/25 1130          Lower Body Dressing Assessment/Training    [place] Level (Lower Body Dressing) lower body dressing skills;don;socks;shoes/slippers;minimum assist (75% patient effort)  -     Position (Lower Body Dressing) edge of bed sitting  -     Comment, (Lower Body Dressing) May benefit from AE teaching, unclear if pt has equipment at home.  -       Row Name 06/24/25 1130          Grooming Assessment/Training    [place] Level (Grooming) set up;wash face, hands  -     Position (Grooming) sitting up in bed  -               User Key  (r) = Recorded By, (t) = Taken By, (c) = Cosigned By      Initials Name Provider Type     Areli Guajardo OT Occupational Therapist                   Obj/Interventions       Row Name 06/24/25 1133 06/24/25 1132       Sensory Assessment (Somatosensory)    Sensory Assessment (Somatosensory) UE sensation intact  - UE sensation intact  -[facility] Name 06/24/25 1133          Vision Assessment/Intervention    Visual Impairment/Limitations corrective lenses full-time  -       Row Name 06/24/25 1133           Range of Motion Comprehensive    General Range of Motion bilateral upper extremity ROM WFL  -       Row Name 06/24/25 1133          Strength Comprehensive (MMT)    General Manual Muscle Testing (MMT) Assessment upper extremity strength deficits identified  -       Row Name 06/24/25 1133          Upper Extremity (Manual Muscle Testing)    Upper Extremity: Manual Muscle Testing (MMT) left UE strength is WFL;right UE strength is WFL  -       Row Name 06/24/25 1133          Motor Skills    Motor Skills functional endurance  -     Functional Endurance impaired activity tolerance  -       Row Name 06/24/25 1133          Balance    Balance Assessment sitting static balance;sitting dynamic balance;standing static balance;standing dynamic balance  -     Static Sitting Balance standby assist  -     Dynamic Sitting Balance contact guard;standby assist  -     Position, Sitting Balance unsupported;sitting edge of bed  -     Static Standing Balance standby assist;verbal cues  -     Dynamic Standing Balance verbal cues;contact guard  -     Position/Device Used, Standing Balance supported;walker, front-wheeled  -     Balance Interventions sitting;standing;sit to stand;supported;occupation based/functional task;weight shifting activity  -     Comment, Balance CGA for safety  -               User Key  (r) = Recorded By, (t) = Taken By, (c) = Cosigned By      Initials Name Provider Type     Areli Guajardo OT Occupational Therapist                   Goals/Plan       Row Name 06/24/25 1140          Transfer Goal 1 (OT)    Activity/Assistive Device (Transfer Goal 1, OT) sit-to-stand/stand-to-sit;bed-to-chair/chair-to-bed;toilet;walker, rolling  -     [place] Level/Cues Needed (Transfer Goal 1, OT) modified independence  -     Time Frame (Transfer Goal 1, OT) long term goal (LTG);10 days  -     Progress/Outcome (Transfer Goal 1, OT) goal ongoing  -       Row Name 06/24/25 1140          Dressing  Goal 1 (OT)    Activity/Device (Dressing Goal 1, OT) lower body dressing;long-handled shoe horn;reacher;sock-aid  -     [place]/Cues Needed (Dressing Goal 1, OT) standby assist  -     Time Frame (Dressing Goal 1, OT) short term goal (STG);5 days  -     Progress/Outcome (Dressing Goal 1, OT) goal ongoing  -       Row Name 06/24/25 1140          Toileting Goal 1 (OT)    Activity/Device (Toileting Goal 1, OT) adjust/manage clothing;perform perineal hygiene;commode;grab bar/safety frame  -     [place] Level/Cues Needed (Toileting Goal 1, OT) verbal cues required;contact guard required  -     Time Frame (Toileting Goal 1, OT) long term goal (LTG);10 days  -     Progress/Outcome (Toileting Goal 1, OT) goal ongoing  -       Row Name 06/24/25 1140          Therapy Assessment/Plan (OT)    Planned Therapy Interventions (OT) activity tolerance training;adaptive equipment training;BADL retraining;functional balance retraining;occupation/activity based interventions;patient/caregiver education/training;strengthening exercise;transfer/mobility retraining  -               User Key  (r) = Recorded By, (t) = Taken By, (c) = Cosigned By      Initials Name Provider Type     Areli Guajardo OT Occupational Therapist                   Clinical Impression       Row Name 06/24/25 8315          Pain Assessment    Pretreatment Pain Rating 0/10 - no pain  -     Posttreatment Pain Rating 2/10  -     Pain Location back  -     Pain Side/Orientation generalized;lower  -LC     Response to Pain Interventions activity participation with increased pain;activity level improved;functional ability improved  -       Row Name 06/24/25 6738          Plan of Care Review    Plan of Care Reviewed With patient  -     Progress no change  -     Outcome Evaluation Pt. presents below baseline with ADLs and functional mobility. Limited by decreased safety awareness/confusion, activity tolerance, generalized weakness, and  mild balance instability. Skilled OT services warranted to promote return to PLOF. Recommend home with assist at discharge.  -LC       Row Name 06/24/25 1134          Therapy Assessment/Plan (OT)    Patient/Family Therapy Goal Statement (OT) To return to PLOF  -LC     Therapy Frequency (OT) daily  -LC     Predicted Duration of Therapy Intervention (OT) To return to PLOF  -LC       Row Name 06/24/25 1134          Therapy Plan Review/Discharge Plan (OT)    Anticipated Discharge Disposition (OT) home with assist  -LC       Row Name 06/24/25 1134          Vital Signs    Pre Systolic BP Rehab 156  -LC     Pre Treatment Diastolic BP 94  -LC     Post Systolic BP Rehab 122  -LC     Post Treatment Diastolic BP 89  -LC     Pre Patient Position Supine  -LC     Post Patient Position Sitting  -LC       Row Name 06/24/25 1134          Positioning and Restraints    Pre-Treatment Position in bed  -LC     Post Treatment Position chair  -LC     In Chair notified nsg;reclined;call light within reach;encouraged to call for assist;exit alarm on;waffle cushion;legs elevated  -LC               User Key  (r) = Recorded By, (t) = Taken By, (c) = Cosigned By      Initials Name Provider Type    LC Areli Guajardo, OT Occupational Therapist                   Outcome Measures       Row Name 06/24/25 1141          How much help from another is currently needed...    Putting on and taking off regular lower body clothing? 3  -LC     Bathing (including washing, rinsing, and drying) 3  -LC     Toileting (which includes using toilet bed pan or urinal) 3  -LC     Putting on and taking off regular upper body clothing 3  -LC     Taking care of personal grooming (such as brushing teeth) 4  -LC     Eating meals 4  -LC     AM-PAC 6 Clicks Score (OT) 20  -LC       Row Name 06/24/25 1136 06/24/25 0855       How much help from another person do you currently need...    Turning from your back to your side while in flat bed without using bedrails? -- 3  -BC     Moving from lying on back to sitting on the side of a flat bed without bedrails? 3  -SC 2  -BC    Moving to and from a bed to a chair (including a wheelchair)? 3  -SC 2  -BC    Standing up from a chair using your arms (e.g., wheelchair, bedside chair)? 3  -SC 2  -BC    Climbing 3-5 steps with a railing? 3  -SC 1  -BC    To walk in hospital room? 3  -SC 2  -BC    AM-PAC 6 Clicks Score (PT) -- 12  -BC    Highest Level of Mobility Goal -- Move to Chair/Commode-4  -BC      Row Name 06/24/25 0013          How much help from another person do you currently need...    Turning from your back to your side while in flat bed without using bedrails? 3  -DB     Moving from lying on back to sitting on the side of a flat bed without bedrails? 2  -DB     Moving to and from a bed to a chair (including a wheelchair)? 2  -DB     Standing up from a chair using your arms (e.g., wheelchair, bedside chair)? 2  -DB     Climbing 3-5 steps with a railing? 1  -DB     To walk in hospital room? 2  -DB     AM-PAC 6 Clicks Score (PT) 12  -DB     Highest Level of Mobility Goal Move to Chair/Commode-4  -DB       Row Name 06/24/25 1141 06/24/25 1136       Functional Assessment    Outcome Measure Options AM-PAC 6 Clicks Daily Activity (OT)  - AM-PAC 6 Clicks Basic Mobility (PT)  -SC              User Key  (r) = Recorded By, (t) = Taken By, (c) = Cosigned By      Initials Name Provider Type    SC Jose Mars, PT Physical Therapist    BC Ursula Spencer, RN Registered Nurse    Areli Patterson OT Occupational Therapist    Mikayla Perez, RN Registered Nurse                    Occupational Therapy Education       Title: PT OT SLP Therapies (In Progress)       Topic: Occupational Therapy (In Progress)       Point: ADL training (In Progress)       Learning Progress Summary            Patient Acceptance, E, NR by  at 6/24/2025 9168                      Point: Precautions (In Progress)       Learning Progress Summary            Patient Acceptance,  E, NR by  at 6/24/2025 0947                      Point: Body mechanics (In Progress)       Learning Progress Summary            Patient Acceptance, E, NR by  at 6/24/2025 0947                                      User Key       Initials Effective Dates Name Provider Type [facility] 06/16/21 -  Areli Guajardo OT Occupational Therapist OT                  OT Recommendation and Plan  Planned Therapy Interventions (OT): activity tolerance training, adaptive equipment training, BADL retraining, functional balance retraining, occupation/activity based interventions, patient/caregiver education/training, strengthening exercise, transfer/mobility retraining  Therapy Frequency (OT): daily  Plan of Care Review  Plan of Care Reviewed With: patient  Progress: no change  Outcome Evaluation: Pt. presents below baseline with ADLs and functional mobility. Limited by decreased safety awareness/confusion, activity tolerance, generalized weakness, and mild balance instability. Skilled OT services warranted to promote return to PLOF. Recommend home with assist at discharge.     Time Calculation:   Evaluation Complexity (OT)  Review Occupational Profile/Medical/Therapy History Complexity: brief/low complexity  Assessment, Occupational Performance/Identification of Deficit Complexity: 1-3 performance deficits  Clinical Decision Making Complexity (OT): problem focused assessment/low complexity  Overall Complexity of Evaluation (OT): low complexity     Time Calculation- OT       Row Name 06/24/25 1142             Time Calculation- OT    OT Start Time 0947  -LC      OT Received On 06/24/25  -      OT Goal Re-Cert Due Date 07/04/25  -         Untimed Charges    OT Eval/Re-eval Minutes 62  -LC         Total Minutes    Untimed Charges Total Minutes 62  -LC       Total Minutes 62  -LC                User Key  (r) = Recorded By, (t) = Taken By, (c) = Cosigned By      Initials Name Provider Type     Areli Guajardo OT  Occupational Therapist                  Therapy Charges for Today       Code Description Service Date Service Provider Modifiers Qty    [id number] HC-OT EVAL LOW COMPLEXITY 5 6/24/2025 Areli Guajardo, OT  1                 Areli Guajardo, MARIO  6/24/2025

## 2025-06-25 VITALS
HEIGHT: 69 IN | SYSTOLIC BLOOD PRESSURE: 139 MMHG | BODY MASS INDEX: 42.21 KG/M2 | TEMPERATURE: 98 F | DIASTOLIC BLOOD PRESSURE: 86 MMHG | RESPIRATION RATE: 18 BRPM | WEIGHT: 285 LBS | OXYGEN SATURATION: 98 % | HEART RATE: 101 BPM

## 2025-06-25 DIAGNOSIS — F33.2 SEVERE EPISODE OF RECURRENT MAJOR DEPRESSIVE DISORDER, WITHOUT PSYCHOTIC FEATURES: ICD-10-CM

## 2025-06-25 DIAGNOSIS — F41.1 GENERALIZED ANXIETY DISORDER: ICD-10-CM

## 2025-06-25 PROCEDURE — 96372 THER/PROPH/DIAG INJ SC/IM: CPT

## 2025-06-25 PROCEDURE — 97116 GAIT TRAINING THERAPY: CPT

## 2025-06-25 PROCEDURE — G0378 HOSPITAL OBSERVATION PER HR: HCPCS

## 2025-06-25 PROCEDURE — 25010000002 HEPARIN (PORCINE) PER 1000 UNITS: Performed by: NEUROLOGICAL SURGERY

## 2025-06-25 PROCEDURE — 99024 POSTOP FOLLOW-UP VISIT: CPT | Performed by: NEUROLOGICAL SURGERY

## 2025-06-25 PROCEDURE — 97110 THERAPEUTIC EXERCISES: CPT

## 2025-06-25 PROCEDURE — 99239 HOSP IP/OBS DSCHRG MGMT >30: CPT | Performed by: INTERNAL MEDICINE

## 2025-06-25 RX ORDER — HEPARIN SODIUM 5000 [USP'U]/ML
5000 INJECTION, SOLUTION INTRAVENOUS; SUBCUTANEOUS EVERY 8 HOURS SCHEDULED
Status: DISCONTINUED | OUTPATIENT
Start: 2025-06-25 | End: 2025-06-25 | Stop reason: HOSPADM

## 2025-06-25 RX ORDER — DULOXETIN HYDROCHLORIDE 60 MG/1
60 CAPSULE, DELAYED RELEASE ORAL EVERY MORNING
Qty: 30 CAPSULE | Refills: 0 | OUTPATIENT
Start: 2025-06-25

## 2025-06-25 RX ADMIN — DULOXETINE 60 MG: 60 CAPSULE, DELAYED RELEASE ORAL at 08:47

## 2025-06-25 RX ADMIN — HYDROCODONE BITARTRATE AND ACETAMINOPHEN 1 TABLET: 5; 325 TABLET ORAL at 08:47

## 2025-06-25 RX ADMIN — LISINOPRIL 20 MG: 20 TABLET ORAL at 08:47

## 2025-06-25 RX ADMIN — PANTOPRAZOLE SODIUM 40 MG: 40 TABLET, DELAYED RELEASE ORAL at 05:52

## 2025-06-25 RX ADMIN — ALLOPURINOL 100 MG: 100 TABLET ORAL at 08:47

## 2025-06-25 RX ADMIN — HEPARIN SODIUM 5000 UNITS: 5000 INJECTION INTRAVENOUS; SUBCUTANEOUS at 08:47

## 2025-06-25 NOTE — PLAN OF CARE
Problem: Adult Inpatient Plan of Care  Goal: Absence of Hospital-Acquired Illness or Injury  Intervention: Identify and Manage Fall Risk  Recent Flowsheet Documentation  Taken 6/25/2025 0437 by Anya Bardales RN  Safety Promotion/Fall Prevention:   activity supervised   assistive device/personal items within reach   clutter free environment maintained   lighting adjusted   nonskid shoes/slippers when out of bed   room organization consistent   safety round/check completed  Taken 6/25/2025 0254 by Anya Bardales RN  Safety Promotion/Fall Prevention:   activity supervised   assistive device/personal items within reach   clutter free environment maintained   lighting adjusted   nonskid shoes/slippers when out of bed   room organization consistent  Taken 6/25/2025 0021 by Anya Bardales RN  Safety Promotion/Fall Prevention:   activity supervised   assistive device/personal items within reach   clutter free environment maintained   lighting adjusted   nonskid shoes/slippers when out of bed   room organization consistent   safety round/check completed  Taken 6/24/2025 2238 by Anya Bardales RN  Safety Promotion/Fall Prevention:   activity supervised   assistive device/personal items within reach   clutter free environment maintained   lighting adjusted   nonskid shoes/slippers when out of bed   room organization consistent   safety round/check completed  Taken 6/24/2025 2000 by Anya Bardales RN  Safety Promotion/Fall Prevention:   activity supervised   assistive device/personal items within reach   clutter free environment maintained   lighting adjusted   nonskid shoes/slippers when out of bed   room organization consistent   safety round/check completed  Intervention: Prevent Skin Injury  Recent Flowsheet Documentation  Taken 6/25/2025 0437 by Anya Bardales RN  Body Position: position changed independently  Skin Protection: incontinence pads utilized  Taken 6/25/2025 0254 by Anya Bardales RN  Body Position: position  changed independently  Taken 6/25/2025 0021 by Anya Bardales RN  Body Position: position changed independently  Skin Protection: incontinence pads utilized  Taken 6/24/2025 2238 by Anya Bardales RN  Body Position: position changed independently  Skin Protection: incontinence pads utilized  Taken 6/24/2025 2000 by Anya Bardales RN  Body Position: position changed independently  Skin Protection: incontinence pads utilized  Intervention: Prevent and Manage VTE (Venous Thromboembolism) Risk  Recent Flowsheet Documentation  Taken 6/25/2025 0437 by Anya Bardales RN  VTE Prevention/Management:   bilateral   SCDs (sequential compression devices) on  Taken 6/25/2025 0254 by Anya Bardales RN  VTE Prevention/Management: patient refused intervention  Taken 6/25/2025 0021 by Anya Bardales RN  VTE Prevention/Management:   patient refused intervention   SCDs (sequential compression devices) off  Taken 6/24/2025 2238 by Anya Bardales RN  VTE Prevention/Management:   bilateral   SCDs (sequential compression devices) on  Taken 6/24/2025 2000 by Anya Bardales RN  VTE Prevention/Management:   bilateral   SCDs (sequential compression devices) on  Intervention: Prevent Infection  Recent Flowsheet Documentation  Taken 6/25/2025 0437 by Anya Bardales RN  Infection Prevention:   environmental surveillance performed   hand hygiene promoted   rest/sleep promoted   single patient room provided  Taken 6/25/2025 0254 by Anya Bardales RN  Infection Prevention:   environmental surveillance performed   hand hygiene promoted   rest/sleep promoted   single patient room provided  Taken 6/25/2025 0021 by Anya Bardales RN  Infection Prevention:   environmental surveillance performed   hand hygiene promoted   single patient room provided   rest/sleep promoted  Taken 6/24/2025 2238 by Anya Bardales RN  Infection Prevention:   environmental surveillance performed   hand hygiene promoted   rest/sleep promoted   single patient room provided  Taken 6/24/2025  2000 by Anya Bardales, RN  Infection Prevention:   environmental surveillance performed   hand hygiene promoted   rest/sleep promoted   single patient room provided  Goal: Optimal Comfort and Wellbeing  Intervention: Provide Person-Centered Care  Recent Flowsheet Documentation  Taken 6/24/2025 2000 by Anya Bardales, RN  Trust Relationship/Rapport:   care explained   choices provided   emotional support provided   empathic listening provided   questions answered   questions encouraged   reassurance provided   thoughts/feelings acknowledged   Goal Outcome Evaluation:

## 2025-06-25 NOTE — PROGRESS NOTES
"NEUROSURGERY PROGRESS NOTE     LOS: 0 days   Patient Care Team:  Christin Yeh APRN as PCP - General (Family Medicine)  Christin Yeh APRN as Nurse Practitioner (Family Medicine)    Chief Complaint:   1.  Low back and leg pain.  2.  Hallucinations.  3.  Constipation.    Interval History:   He did not ambulate yesterday.  His pain is improved.  He is still seeing bugs crawling on the ceiling.  Patient Complaints: Ongoing hallucinations.  Patient Denies: Weakness or numbness.    Review of Systems:   Musculoskeletal and Neurological systems were reviewed and are negative except for:  Musculoskeletal: positive for back pain.      Vital Signs: Blood pressure 127/85, pulse 100, temperature 98 °F (36.7 °C), temperature source Oral, resp. rate 18, height 175.3 cm (69\"), weight 129 kg (285 lb), SpO2 99%.  Intake/Output:   Intake/Output Summary (Last 24 hours) at 6/25/2025 0657  Last data filed at 6/24/2025 2347  Gross per 24 hour   Intake 1200 ml   Output 1000 ml   Net 200 ml     Positive BM this morning.    Physical Exam:  The patient awakens and is appropriate.  He is generally oriented.  He follows simple commands.  Dry dressing is in place on his incision.     Data Review:    Results from last 7 days   Lab Units 06/24/25  0608   SODIUM mmol/L 135*   POTASSIUM mmol/L 3.5   CHLORIDE mmol/L 98   CO2 mmol/L 25.0   BUN mg/dL 16.7   CREATININE mg/dL 1.02   GLUCOSE mg/dL 128*   CALCIUM mg/dL 8.6     Results from last 7 days   Lab Units 06/24/25  0609   WBC 10*3/mm3 10.63   HEMOGLOBIN g/dL 11.4*   HEMATOCRIT % 35.2*   PLATELETS 10*3/mm3 293         Assessment/Plan:  1.  L4-5 spondylolisthesis and instability status post decompression with fusion and stabilization on 6/16/2025.  Afebrile.  Incision looks good.  Low index of suspicion for postop infection.  2.  Hallucinations.  3.  Constipation.  4.  Morbid obesity.  5.  Disposition: Postop day #9.  Mobilize patient. Appreciate hospitalist input.      aYng FLOOD" MD Gutierrez  06/25/25  06:57 EDT

## 2025-06-25 NOTE — TELEPHONE ENCOUNTER
Will need appointment and to be seen to discuss medication. Also looks like another provider is prescribing.

## 2025-06-25 NOTE — THERAPY DISCHARGE NOTE
Patient Name: Bonilla Sterling Jr.  : 1960    MRN: [id number]                              Today's Date: 2025       Admit Date: 2025    Visit Dx:     ICD-10-CM ICD-9-CM   1. Encephalopathy acute  G93.40 348.30   2. Postoperative pain  G89.18 338.18   3. Polypharmacy  Z79.899 V58.69   4. Constipation, unspecified constipation type  K59.00 564.00     Patient Active Problem List   Diagnosis    Hypertension    Gastroesophageal reflux disease without esophagitis    Bulging of lumbar intervertebral disc    Lumbar stenosis with neurogenic claudication    Recurrent low back pain     Past Medical History:   Diagnosis Date    Acid reflux     Anxiety     Arthritis     OSTEO    Cervical disc disorder     Bulging disc    Chronic pain disorder     Back     CTS (carpal tunnel syndrome)     Elevated cholesterol     Gout     HL (hearing loss)     Hypertension     Low back pain     Lumbosacral disc disease     Bulging disc    Memory loss     Have an appointment to confirm    Peripheral neuropathy     Sinus headache     Sleep apnea     DOES NOT USE CPAP    Thoracic disc disorder     Bulging disc    Vision loss     Wears glasses     Wears partial dentures     Full upper plate - advised no adhesives DOS     Past Surgical History:   Procedure Laterality Date    CARPAL TUNNEL RELEASE Bilateral     COLONOSCOPY  2009    COLONOSCOPY N/A 2020    Procedure: COLONOSCOPY;  Surgeon: Chantale Naik MD;  Location: [facility] ENDOSCOPY;  Service: Gastroenterology;  Laterality: N/A;    CYST REMOVAL      FOREHEAD, SEBACEOUS CYST    LUMBAR LAMINECTOMY WITH FUSION N/A 2025    Procedure: LUMBAR FUSION DECOMPRESSION WITH PEDICLE SCREWS L4-5;  Surgeon: Yang Whitley MD;  Location[facility] OR;  Service: Neurosurgery;  Laterality: N/A;    MOUTH SURGERY      Oral extractions    ORIF WRIST FRACTURE Right       General Information       Row Name 25 1040          Physical Therapy Time and  Intention    Document Type discharge treatment  -SC     Mode of Treatment physical therapy  -SC       Row Name 06/25/25 1040          General Information    Patient Profile Reviewed yes  -SC     Existing Precautions/Restrictions fall;spinal  -SC       Row Name 06/25/25 1040          Cognition    Orientation Status (Cognition) oriented x 3  -SC       Row Name 06/25/25 1040          Safety Issues/Impairments Affecting Functional Mobility    Impairments Affecting Function (Mobility) balance;cognition;endurance/activity tolerance;postural/trunk control  -SC     Comment, Safety Issues/Impairments (Mobility) alert following commands  -SC               User Key  (r) = Recorded By, (t) = Taken By, (c) = Cosigned By      Initials Name Provider Type    SC Jose Mars, PT Physical Therapist                   Mobility       Row Name 06/25/25 1041          Bed Mobility    Bed Mobility supine-sit;scooting/bridging  -SC     Scooting/Bridging [place] (Bed Mobility) verbal cues;independent  -SC     Supine-Sit [place] (Bed Mobility) verbal cues;independent  -SC     Comment, (Bed Mobility) worked on log rolling oob with VC. Demonstrated good technique  -SC       Row Name 06/25/25 1041          Transfers    Comment, (Transfers) STS from EOB with good control  -SC       Row Name 06/25/25 1041          Sit-Stand Transfer    Sit-Stand [place] (Transfers) independent  -[facility] Name 06/25/25 1041          Gait/Stairs (Locomotion)    [place] Level (Gait) independent  -SC     Distance in Feet (Gait) 400  -SC     Deviations/Abnormal Patterns (Gait) base of support, wide  -SC     Comment, (Gait/Stairs) patient ambulated around tran without walker.  Mild unsteadiness noted.  No LOB noted.  -SC               User Key  (r) = Recorded By, (t) = Taken By, (c) = Cosigned By      Initials Name Provider Type    SC Jose Mars, PT Physical Therapist                   Obj/Interventions       Row Name 06/25/25 1049           Motor Skills    Therapeutic Exercise shoulder;hip;knee;ankle  -SC       Row Name 06/25/25 1044          Shoulder (Therapeutic Exercise)    Shoulder (Therapeutic Exercise) AROM (active range of motion)  -SC     Shoulder AROM (Therapeutic Exercise) flexion;extension;supine  -[facility] Name 06/25/25 1044          Hip (Therapeutic Exercise)    Hip (Therapeutic Exercise) AROM (active range of motion)  -SC     Hip AROM (Therapeutic Exercise) bilateral;flexion;extension;aBduction;aDduction;10 repetitions  -[facility] Name 06/25/25 1044          Knee (Therapeutic Exercise)    Knee (Therapeutic Exercise) isometric exercises  -[facility] Name 06/25/25 1044          Ankle (Therapeutic Exercise)    Ankle (Therapeutic Exercise) AROM (active range of motion)  -SC     Ankle AROM (Therapeutic Exercise) bilateral;dorsiflexion;plantarflexion;10 repetitions  -[facility] Name 06/25/25 1044          Balance    Position/Device Used, Standing Balance unsupported  -SC     Comment, Balance no LOB  -SC               User Key  (r) = Recorded By, (t) = Taken By, (c) = Cosigned By      Initials Name Provider Type    SC Jose Mars A, PT Physical Therapist                   Goals/Plan       [facility] Name 06/25/25 1048          Bed Mobility Goal 1 (PT)    Activity/Assistive Device (Bed Mobility Goal 1, PT) sit to supine/supine to sit  -SC     [place] Level/Cues Needed (Bed Mobility Goal 1, PT) modified independence  -SC     Time Frame (Bed Mobility Goal 1, PT) long term goal (LTG);3 days  -SC     Progress/Outcomes (Bed Mobility Goal 1, PT) goal met  -[facility] Name 06/25/25 1048          Transfer Goal 1 (PT)    Activity/Assistive Device (Transfer Goal 1, PT) sit-to-stand/stand-to-sit;walker, rolling  -SC     [place] Level/Cues Needed (Transfer Goal 1, PT) modified independence  -SC     Time Frame (Transfer Goal 1, PT) long term goal (LTG);3 days  -SC     Progress/Outcome (Transfer Goal 1, PT) goal met  -[facility] Name  06/25/25 1048          Gait Training Goal 1 (PT)    Activity/Assistive Device (Gait Training Goal 1, PT) gait (walking locomotion);walker, rolling  -SC     Distance (Gait Training Goal 1, PT) 500  -SC     Time Frame (Gait Training Goal 1, PT) long term goal (LTG);3 days  -SC     Progress/Outcome (Gait Training Goal 1, PT) goal partially met  -[facility] Name 06/25/25 1048          Stairs Goal 1 (PT)    Activity/Assistive Device (Stairs Goal 1, PT) stairs, all skills  -SC     [place] Level/Cues Needed (Stairs Goal 1, PT) contact guard required  -SC     Number of Stairs (Stairs Goal 1, PT) 2  -SC     Time Frame (Stairs Goal 1, PT) long term goal (LTG);10 days  -SC     Progress/Outcome (Stairs Goal 1, PT) goal not met  -SC       Row Name 06/25/25 1048          Patient Education Goal (PT)    Activity (Patient Education Goal, PT) know back precautions  -SC     [place]/Cues/Accuracy (Memory Goal 2, PT) demonstrates adequately  -SC     Time Frame (Patient Education Goal, PT) long term goal (LTG);10 days  -SC     Progress/Outcome (Patient Education Goal, PT) goal met  -SC               User Key  (r) = Recorded By, (t) = Taken By, (c) = Cosigned By      Initials Name Provider Type    SC Jose Mars, PT Physical Therapist                   Clinical Impression       Row Name 06/25/25 1045          Pain    Pain Location back  -SC     Pain Side/Orientation bilateral  -SC     Pain Management Interventions positioning techniques utilized  -SC     Response to Pain Interventions activity participation with tolerable pain  -SC     Additional Documentation Pain Scale: FACES Pre/Post-Treatment (Group)  -[facility] Name 06/25/25 1045          Pain Scale: FACES Pre/Post-Treatment    Pain: FACES Scale, Pretreatment 0-->no hurt  -SC     Posttreatment Pain Rating 2-->hurts little bit  -[facility] Name 06/25/25 1045          Plan of Care Review    Plan of Care Reviewed With patient  -SC     Progress improving  -SC      Outcome Evaluation Patient was able to mobilize out of bed and around tran. His HR was stable. He is back to his post surgery status and ready for home.  -SC       Row Name 06/25/25 1045          Vital Signs    Pretreatment Heart Rate (beats/min) 80  -SC     Posttreatment Heart Rate (beats/min) 100  -[facility] Name 06/25/25 1045          Positioning and Restraints    Pre-Treatment Position in bed  -SC     Post Treatment Position chair  -SC     In Chair notified nsg;reclined;sitting;call light within reach;encouraged to call for assist;exit alarm on  -SC               User Key  (r) = Recorded By, (t) = Taken By, (c) = Cosigned By      Initials Name Provider Type    SC Joes Mars PT Physical Therapist                   Outcome Measures       Row Name 06/25/25 1049 06/25/25 0900       How much help from another person do you currently need...    Turning from your back to your side while in flat bed without using bedrails? 4  -SC 3  -MH    Moving from lying on back to sitting on the side of a flat bed without bedrails? 4  -SC 3  -MH    Moving to and from a bed to a chair (including a wheelchair)? 4  -SC 3  -MH    Standing up from a chair using your arms (e.g., wheelchair, bedside chair)? 4  -SC 3  -MH    Climbing 3-5 steps with a railing? 4  -SC 3  -MH    To walk in hospital room? 4  -SC 3  -MH    AM-PAC 6 Clicks Score (PT) 24  -SC 18  -    Highest Level of Mobility Goal Walk 250 Feet or More - 8  -SC Walk 10 Steps or More-6  -[facility] Name 06/25/25 1049          Functional Assessment    Outcome Measure Options AM-PAC 6 Clicks Basic Mobility (PT)  -SC               User Key  (r) = Recorded By, (t) = Taken By, (c) = Cosigned By      Initials Name Provider Type    SC Jose Mars PT Physical Therapist    Zeinab Romero RN Registered Nurse                  Physical Therapy Education       Title: PT OT SLP Therapies (In Progress)       Topic: Physical Therapy (Done)       Point: Mobility training  (Done)       Learning Progress Summary            Patient Eager, E,TB,D,H, VU,DU by SC at 6/25/2025 1049    Comment: reviewed back precautions and HEP    Eager, E, VU,NR by SC at 6/24/2025 1137    Comment: reviewed HEP                      Point: Home exercise program (Done)       Learning Progress Summary            Patient Eager, E,TB,D,H, VU,DU by SC at 6/25/2025 1049    Comment: reviewed back precautions and HEP    Eager, E, VU,NR by SC at 6/24/2025 1137    Comment: reviewed HEP                      Point: Body mechanics (Done)       Learning Progress Summary            Patient Eager, E,TB,D,H, VU,DU by SC at 6/25/2025 1049    Comment: reviewed back precautions and HEP    Eager, E, VU,NR by SC at 6/24/2025 1137    Comment: reviewed HEP                      Point: Precautions (Done)       Learning Progress Summary            Patient Eager, E,TB,D,H, VU,DU by SC at 6/25/2025 1049    Comment: reviewed back precautions and HEP    Eager, E, VU,NR by SC at 6/24/2025 1137    Comment: reviewed HEP                                      User Key       Initials Effective Dates Name Provider Type Discipline    SC 02/03/23 -  Jose Mars, PT Physical Therapist PT                  PT Recommendation and Plan  Planned Therapy Interventions (PT): balance training, bed mobility training, gait training, home exercise program, ROM (range of motion), patient/family education, stair training, strengthening, stretching, transfer training  Progress: improving  Outcome Evaluation: Patient was able to mobilize out of bed and around tran. His HR was stable. He is back to his post surgery status and ready for home.     Time Calculation:   PT Evaluation Complexity  History, PT Evaluation Complexity: 3 or more personal factors and/or comorbidities  Examination of Body Systems (PT Eval Complexity): total of 4 or more elements  Clinical Presentation (PT Evaluation Complexity): evolving  Clinical Decision Making (PT Evaluation Complexity):  moderate complexity  Overall Complexity (PT Evaluation Complexity): moderate complexity     PT Charges       Row Name 06/25/25 0942             Time Calculation    Start Time 0942  -SC      PT Received On 06/25/25  -SC      PT Goal Re-Cert Due Date 07/04/25  -SC         Timed Charges    71361 - PT Therapeutic Exercise Minutes 10  -SC      36913 - Gait Training Minutes  8  -SC      35451 - PT Therapeutic Activity Minutes 8  -SC         Total Minutes    Timed Charges Total Minutes 26  -SC       Total Minutes 26  -SC                User Key  (r) = Recorded By, (t) = Taken By, (c) = Cosigned By      Initials Name Provider Type    SC Jose Mars PT Physical Therapist                  Therapy Charges for Today       Code Description Service Date Service Provider Modifiers Qty    98320888593 HC PT THERAPEUTIC ACT EA 15 MIN 6/24/2025 Jose Mars, PT GP 1    34085041788 HC PT EVAL MOD COMPLEXITY 3 6/24/2025 Jose Mars, PT GP 1    97789354952 HC PT THER PROC EA 15 MIN 6/25/2025 Jose Mars, PT GP 1    54638324464 HC GAIT TRAINING EA 15 MIN 6/25/2025 Jose Mars, PT GP 1            PT G-Codes  Outcome Measure Options: AM-PAC 6 Clicks Basic Mobility (PT)  AM-PAC 6 Clicks Score (PT): 24  AM-PAC 6 Clicks Score (OT): 20    PT Discharge Summary  Anticipated Discharge Disposition (PT): home with assist    Jose Mars, PT  6/25/2025

## 2025-06-25 NOTE — DISCHARGE INSTR - ACTIVITY
- As directed by your physician and physical therapist    - No bending or twisting    - No driving until cleared by your doctor

## 2025-06-25 NOTE — PLAN OF CARE
Problem: Adult Inpatient Plan of Care  Goal: Plan of Care Review  Outcome: Adequate for Care Transition  Goal: Patient-Specific Goal (Individualized)  Outcome: Adequate for Care Transition  Goal: Absence of Hospital-Acquired Illness or Injury  Outcome: Adequate for Care Transition  Intervention: Identify and Manage Fall Risk  Recent Flowsheet Documentation  Taken 6/25/2025 1027 by Zeinab Wolfe RN  Safety Promotion/Fall Prevention:   activity supervised   assistive device/personal items within reach   clutter free environment maintained   elopement precautions   fall prevention program maintained   gait belt   lighting adjusted   mobility aid in reach   muscle strengthening facilitated   nonskid shoes/slippers when out of bed   room organization consistent   safety round/check completed   toileting scheduled  Taken 6/25/2025 0847 by Zeinab Wolfe RN  Safety Promotion/Fall Prevention:   activity supervised   assistive device/personal items within reach   clutter free environment maintained   elopement precautions   fall prevention program maintained   gait belt   lighting adjusted   mobility aid in reach   muscle strengthening facilitated   nonskid shoes/slippers when out of bed   room organization consistent   safety round/check completed   toileting scheduled  Intervention: Prevent Skin Injury  Recent Flowsheet Documentation  Taken 6/25/2025 1027 by Zeinab Wolfe RN  Body Position: position maintained  Skin Protection:   incontinence pads utilized   silicone foam dressing in place   transparent dressing maintained  Taken 6/25/2025 0847 by Zeinab Wolfe RN  Body Position: position maintained  Skin Protection:   incontinence pads utilized   silicone foam dressing in place   transparent dressing maintained  Intervention: Prevent and Manage VTE (Venous Thromboembolism) Risk  Recent Flowsheet Documentation  Taken 6/25/2025 1027 by Zeinab Wolfe RN  VTE Prevention/Management:   bilateral   SCDs (sequential  compression devices) on  Taken 6/25/2025 0847 by Zeinab Wolfe RN  VTE Prevention/Management:   bilateral   SCDs (sequential compression devices) off  Intervention: Prevent Infection  Recent Flowsheet Documentation  Taken 6/25/2025 1027 by Zeinab Wolfe RN  Infection Prevention:   environmental surveillance performed   equipment surfaces disinfected   hand hygiene promoted   personal protective equipment utilized   rest/sleep promoted   single patient room provided  Taken 6/25/2025 0847 by Zeinab Wolfe RN  Infection Prevention:   environmental surveillance performed   equipment surfaces disinfected   hand hygiene promoted   personal protective equipment utilized   rest/sleep promoted   single patient room provided  Goal: Optimal Comfort and Wellbeing  Outcome: Adequate for Care Transition  Intervention: Provide Person-Centered Care  Recent Flowsheet Documentation  Taken 6/25/2025 0847 by Zeinab Wolfe RN  Trust Relationship/Rapport:   care explained   choices provided   emotional support provided   empathic listening provided   questions answered   questions encouraged   reassurance provided   thoughts/feelings acknowledged  Goal: Readiness for Transition of Care  Outcome: Adequate for Care Transition     Problem: Comorbidity Management  Goal: Maintenance of Heart Failure Symptom Control  Outcome: Adequate for Care Transition  Intervention: Maintain Heart Failure Management  Recent Flowsheet Documentation  Taken 6/25/2025 1027 by Zeinab Wolfe RN  Medication Review/Management: medications reviewed  Taken 6/25/2025 0847 by Zeinab Wolfe RN  Medication Review/Management: medications reviewed  Goal: Blood Pressure in Desired Range  Outcome: Adequate for Care Transition  Intervention: Maintain Blood Pressure Management  Recent Flowsheet Documentation  Taken 6/25/2025 1027 by Zeinab Wolfe RN  Medication Review/Management: medications reviewed  Taken 6/25/2025 0847 by Zeinab Wolfe RN  Medication  Review/Management: medications reviewed     Problem: Skin Injury Risk Increased  Goal: Skin Health and Integrity  Outcome: Adequate for Care Transition  Intervention: Optimize Skin Protection  Recent Flowsheet Documentation  Taken 6/25/2025 1027 by Zeinab Wolfe RN  Pressure Reduction Techniques: frequent weight shift encouraged  Head of Bed (HOB) Positioning: HOB elevated  Pressure Reduction Devices:   pressure-redistributing mattress utilized   positioning supports utilized  Skin Protection:   incontinence pads utilized   silicone foam dressing in place   transparent dressing maintained  Taken 6/25/2025 0847 by Zeinab Wolfe RN  Pressure Reduction Techniques: frequent weight shift encouraged  Head of Bed (HOB) Positioning: HOB elevated  Pressure Reduction Devices:   pressure-redistributing mattress utilized   positioning supports utilized  Skin Protection:   incontinence pads utilized   silicone foam dressing in place   transparent dressing maintained     Problem: Fall Injury Risk  Goal: Absence of Fall and Fall-Related Injury  Outcome: Adequate for Care Transition  Intervention: Identify and Manage Contributors  Recent Flowsheet Documentation  Taken 6/25/2025 1027 by Zeinab Wolfe RN  Medication Review/Management: medications reviewed  Taken 6/25/2025 0847 by Zeinab Wolfe RN  Medication Review/Management: medications reviewed  Intervention: Promote Injury-Free Environment  Recent Flowsheet Documentation  Taken 6/25/2025 1027 by Zeinab Wolfe RN  Safety Promotion/Fall Prevention:   activity supervised   assistive device/personal items within reach   clutter free environment maintained   elopement precautions   fall prevention program maintained   gait belt   lighting adjusted   mobility aid in reach   muscle strengthening facilitated   nonskid shoes/slippers when out of bed   room organization consistent   safety round/check completed   toileting scheduled  Taken 6/25/2025 0847 by Zeinab Wolfe  RN  Safety Promotion/Fall Prevention:   activity supervised   assistive device/personal items within reach   clutter free environment maintained   elopement precautions   fall prevention program maintained   gait belt   lighting adjusted   mobility aid in reach   muscle strengthening facilitated   nonskid shoes/slippers when out of bed   room organization consistent   safety round/check completed   toileting scheduled     Problem: Pain Acute  Goal: Optimal Pain Control and Function  Outcome: Adequate for Care Transition  Intervention: Optimize Psychosocial Wellbeing  Recent Flowsheet Documentation  Taken 6/25/2025 0847 by Zeinab Wolfe RN  Diversional Activities:   television   smartphone  Intervention: Prevent or Manage Pain  Recent Flowsheet Documentation  Taken 6/25/2025 1027 by Zeinab Wolfe, RN  Medication Review/Management: medications reviewed  Taken 6/25/2025 0847 by Zeinab Wolfe RN  Medication Review/Management: medications reviewed   Goal Outcome Evaluation:

## 2025-06-25 NOTE — DISCHARGE SUMMARY
[facility] Medicine Services  DISCHARGE SUMMARY    Patient Name: Bonilla Sterling Jr.  : 1960  MRN: [id number]    Date of Admission: 2025  3:31 PM  Date of Discharge:  2025  Primary Care Physician: Christin Yeh APRN    Consults       No orders found from 2025 to 2025.            Hospital Course     Presenting Problem: Hallucinations, back pain    Active Hospital Problems    Diagnosis  POA    **Recurrent low back pain [M54.50]  Yes      Resolved Hospital Problems   No resolved problems to display.          Hospital Course:  Bonilla Sterling Jr. is a 65 y.o. male with postoperative lumbar spine pain, also constipation, as well as visual hallucinations secondary to medications     L4-L5 spondylolisthesis s/p decompression and fusion on    Postoperative lumbar spine pain  - Pain control has been adequate without use of narcotics, encouraged patient to avoid narcotics going home and focus on mobility and PT.  - PT/OT, plan is home at discharge  - Dr. Whitley followed, can keep already scheduled f/u with neurosurgery in July, low suspicion for post-operative infection.     Patient hallucinations secondary to medications/THC  - likely secondary to use of both opioid pain medications in combination w/THC use  - discussed avoiding these two things together, his hallucinations have resolved  - d/c percocet at discharge     Constipation  - resolved, now having bowel movements.     Hypertension  - resume home regimen     Hyperlipidemia  - Continue statin      Anxiety/depression  - Continue Cymbalta and Elavil     Gout  - Continue allopurinol.  - Not currently in exacerbation/acute flare.     Alzheimer's dementia  - Continue Aricept     GERD     Sleep apnea  - He does not use CPAP at home, declines to use while here.    Discharge Follow Up Recommendations for outpatient labs/diagnostics:   - f/u with PCP in 1-2 weeks  - avoid further narcotic pain medications in  combination with THC   - f/u with Neurosurgery as previously scheduled 7/9/25    Day of Discharge     HPI:   Patient reports no further hallucinations, back pain is much better. He feels ready for discharge home.    Review of Systems  Gen- No fevers, chills  CV- No chest pain, palpitations  Resp- No cough, dyspnea  GI- No N/V/D, abd pain      Vital Signs:   Temp:  [97.9 °F (36.6 °C)-98.3 °F (36.8 °C)] 97.9 °F (36.6 °C)  Heart Rate:  [] 91  Resp:  [18] 18  BP: (127-139)/(83-87) 139/86      Physical Exam:  Constitutional: No acute distress, awake, alert  HENT: NCAT, mucous membranes moist  Respiratory: Clear to auscultation bilaterally, respiratory effort normal   Cardiovascular: RRR, no murmurs, rubs, or gallops  Gastrointestinal: soft, nontender, nondistended  Musculoskeletal: No bilateral ankle edema  Psychiatric: Appropriate affect, cooperative  Neurologic: Oriented x 3, speech clear, no focal deficits  Skin: No rashes      Pertinent  and/or Most Recent Results     LAB RESULTS:      Lab 06/24/25  0609 06/23/25  1502   WBC 10.63 11.45*   HEMOGLOBIN 11.4* 13.3   HEMATOCRIT 35.2* 41.4   PLATELETS 293 307   NEUTROS ABS 7.84* 8.20*   IMMATURE GRANS (ABS) 0.05 0.04   LYMPHS ABS 1.69 2.23   MONOS ABS 0.88 0.87   EOS ABS 0.14 0.08   MCV 83.0 84.0   CRP  --  17.09*   PROCALCITONIN  --  0.11   LACTATE  --  1.5         Lab 06/24/25  0608 06/23/25  1502   SODIUM 135* 135*   POTASSIUM 3.5 4.2   CHLORIDE 98 94*   CO2 25.0 28.0   ANION GAP 12.0 13.0   BUN 16.7 16.8   CREATININE 1.02 1.26   EGFR 80.6 62.5   GLUCOSE 128* 108*   CALCIUM 8.6 9.7   MAGNESIUM 1.7 2.0   PHOSPHORUS  --  3.7   TSH  --  1.170         Lab 06/24/25  0608 06/23/25  1502   TOTAL PROTEIN 7.2 8.3   ALBUMIN 3.5 4.0   GLOBULIN 3.7 4.3   ALT (SGPT) 29 27   AST (SGOT) 31 33   BILIRUBIN 0.4 0.5   ALK PHOS 93 99                     Brief Urine Lab Results  (Last result in the past 365 days)        Color   Clarity   Blood   Leuk Est   Nitrite   Protein   CREAT    Urine HCG        06/23/25 1517 Yellow   Clear   Negative   Negative   Negative   Trace                 Microbiology Results (last 10 days)       Procedure Component Value - Date/Time    Blood Culture - Blood, Arm, Right [[id number]]  (Normal) Collected: 06/23/25 1806    Lab Status: Preliminary result Specimen: Blood from Arm, Right Updated: 06/24/25 2000     Blood Culture No growth at 24 hours    Blood Culture - Blood, Arm, Left [[id number]]  (Normal) Collected: 06/23/25 1806    Lab Status: Preliminary result Specimen: Blood from Arm, Left Updated: 06/24/25 2000     Blood Culture No growth at 24 hours    COVID PRE-OP / PRE-PROCEDURE SCREENING ORDER (NO ISOLATION) - Swab, Nasopharynx [[id number]]  (Normal) Collected: 06/23/25 1517    Lab Status: Final result Specimen: Swab from Nasopharynx Updated: 06/23/25 1627    Narrative:      The following orders were created for panel order COVID PRE-OP / PRE-PROCEDURE SCREENING ORDER (NO ISOLATION) - Swab, Nasopharynx.  Procedure                               Abnormality         Status                     ---------                               -----------         ------                     COVID-19, FLU A/B, RSV P...[[id number]]  Normal              Final result                 Please view results for these tests on the individual orders.    COVID-19, FLU A/B, RSV PCR 1 HR TAT - Swab, Nasopharynx [[id number]]  (Normal) Collected: 06/23/25 1517    Lab Status: Final result Specimen: Swab from Nasopharynx Updated: 06/23/25 1627     COVID19 Not Detected     Influenza A PCR Not Detected     Influenza B PCR Not Detected     RSV, PCR Not Detected    Narrative:      Fact sheet for providers: https://www.fda.gov/media/493306/download    Fact sheet for patients: https://www.fda.gov/media/396351/download    Test performed by PCR.            XR Abdomen KUB  Result Date: 6/23/2025  XR ABDOMEN KUB, XR SPINE LUMBAR 2 OR 3 VW Date of Exam: 6/23/2025 7:28 PM EDT Indication: postoperative  constipation Comparison: Lumbar spine MRI 6/23/2025 Findings: Moderate colonic stool. No dilated loops of bowel to suggest ileus or obstruction. No significant gastric distention. No appreciable renal calcifications. Mild to moderate degenerative osteoarthritis of the hip joints. L4-L5 spinal fusion and discectomy. No visualized hardware fracture or signs of loosening. No visualized displaced fracture or vertebral body height loss. Minimal grade 1 anterolisthesis L4 and L5 stable from prior. Mild lumbar dextrocurvature possibly positional. Residual degenerative disc disease and facet arthropathy better assessed on MRI performed same day. Abdominal aortic atherosclerotic disease. Degenerative osteoarthritis of the SI joints.     Impression: 1. Moderate formed colonic stool without obstruction. 2. Radiographically uncomplicated L4-L5 fusion hardware. Spondylotic change better assessed on same day lumbar spine MRI. Electronically Signed: Bethel Sanchez MD  6/23/2025 8:03 PM EDT  Workstation ID: [id number]    XR Spine Lumbar 2 or 3 View  Result Date: 6/23/2025  XR ABDOMEN KUB, XR SPINE LUMBAR 2 OR 3 VW Date of Exam: 6/23/2025 7:28 PM EDT Indication: postoperative constipation Comparison: Lumbar spine MRI 6/23/2025 Findings: Moderate colonic stool. No dilated loops of bowel to suggest ileus or obstruction. No significant gastric distention. No appreciable renal calcifications. Mild to moderate degenerative osteoarthritis of the hip joints. L4-L5 spinal fusion and discectomy. No visualized hardware fracture or signs of loosening. No visualized displaced fracture or vertebral body height loss. Minimal grade 1 anterolisthesis L4 and L5 stable from prior. Mild lumbar dextrocurvature possibly positional. Residual degenerative disc disease and facet arthropathy better assessed on MRI performed same day. Abdominal aortic atherosclerotic disease. Degenerative osteoarthritis of the SI joints.     Impression: 1. Moderate formed  colonic stool without obstruction. 2. Radiographically uncomplicated L4-L5 fusion hardware. Spondylotic change better assessed on same day lumbar spine MRI. Electronically Signed: Bethel Sanchez MD  6/23/2025 8:03 PM EDT  Workstation ID: [id number]    MRI Lumbar Spine With & Without Contrast  Result Date: 6/23/2025  MRI LUMBAR SPINE W WO CONTRAST Date of Exam: 6/23/2025 5:26 PM EDT Indication: Worsening back pain and weakness, encephalopathy following 6/16 fusion.  Comparison: Lumbar spine CT 4/15/2025. MR lumbar spine 3/6/2025. Technique:  Routine multiplanar/multisequence sequence images of the lumbar spine were obtained before and after the uneventful administration of 20 mL Multihance.  Findings: Study is degraded by motion artifact. Within technical limitation: Vertebral numbering: The last well formed disc is labeled L5-S1. Alignment: There is straightening of normal lumbar lordosis. Vertebrae: Posterior decompression with instrumented and interbody fusion at L4-5 with associated hardware artifacts which limits evaluation of adjacent structures, including adjacent vertebral bodies and spinal canal. Multilevel degenerative endplate changes, including Modic type II/fatty endplate degenerative change at L5-S1.. Vertebral body heights are otherwise maintained.No focal suspicious appearing marrow lesions or bone marrow edema within limitation of hardware artifacts. Discs and spinal canal: Multilevel disc desiccation and disc height loss. Degenerative changes detailed below by level.. Spinal cord and cauda equina: The visible spinal cord has normal signal and morphology. No cauda equina compression. The conus tip lies at L2 level. Adjacent soft tissues: Posterior paraspinal soft tissue edema, without organized fluid collection seen, likely postoperative. After intravenous contrast, no convincing abnormal enhancement, noting hardware artifacts precludes accurate evaluation on postcontrast sequences from L3-4 through  L4-5 level. Findings by level: T11-12: No significant interval change.. No significant posterior disc bulge.. Mild bilateral facet arthropathy. No significant spinal canal or foraminal stenosis. T12-L1: No significant interval change. No significant posterior disc bulge. Mild to moderate bilateral facet arthropathy and ligamentum flavum thickening. No significant spinal canal or foraminal stenosis. L1-2: No significant interval change. No significant posterior disc bulge. Moderate bilateral facet arthropathy with ligamentum flavum thickening. No significant spinal canal or foraminal stenosis. L2-3: No significant interval change. Mild diffuse disc bulge. Moderate bilateral facet arthropathy with ligamentum flavum thickening. No significant spinal canal or foraminal stenosis. L3-4: No significant interval change. Diffuse disc bulge with osteophytic ridging. Severe bilateral facet arthropathy with ligamentum flavum thickening. Mild spinal canal stenosis. Moderate bilateral foraminal narrowing. L4-5: Interval postoperative changes. Interval improved spinal canal stenosis. Spinal canal appears grossly patent. Bilateral facet arthropathy. Severe left greater than right foraminal narrowing. L5-S1: No significant interval change. Osteophytic ridging. Severe bilateral facet arthropathy.. No significant spinal canal stenosis. Moderate to severe bilateral foraminal narrowing.     Impression: Study is degraded by motion artifact. Interval postoperative changes at L4-5 with improved spinal canal stenosis at this level. Posterior paraspinal soft tissue edema without organized collection, favored postoperative. Otherwise, no significant interval change in multilevel degenerative changes in the lumbar spine. Electronically Signed: Dimitris Narvaez MD  6/23/2025 6:08 PM EDT  Workstation ID: [id number]    CT Head Without Contrast  Result Date: 6/23/2025  CT HEAD WO CONTRAST Date of Exam: 6/23/2025 3:53 PM EDT Indication: altered  mental status, suspect metabolic. Comparison: Head CT 4/20/2025. Brain MRI 9/11/2024. Technique: Axial CT images were obtained of the head without contrast administration.  Automated exposure control and iterative construction methods were used. Findings: No acute intracranial hemorrhage.Intact appearing gray-white differentiation.No extra-axial fluid collection.No significant mass effect. No hydrocephalus. Mild generalized parenchymal volume loss. Scattered areas of periventricular and subcortical white matter hypoattenuation, nonspecific, perhaps from small vessel ischemic/hypertensive changes in a patient of this age.There are intracranial atherosclerotic calcifications. Mild scattered mucosal thickening in the paranasal sinuses.Mastoid air cells are essentially clear.Included globes and orbits appear unremarkable by CT. No acute or aggressive appearing bony or extracranial soft tissue process.     Impression: No acute intracranial findings. Electronically Signed: Dimitris Narvaez MD  6/23/2025 4:04 PM EDT  Workstation ID: [id number]    XR Chest 1 View  Result Date: 6/23/2025  XR CHEST 1 VW Date of Exam: 6/23/2025 3:25 PM EDT Indication: Altered mental status, infectious work-up Comparison: None available. Findings: The cardiomediastinal silhouette is within normal limits. Pulmonary vascularity appears normal. There is no acute airspace consolidation, pleural effusion, or pneumothorax. There are degenerative changes of the thoracic spine.     Impression: No acute cardiopulmonary abnormality. Electronically Signed: Keven Muir MD  6/23/2025 3:39 PM EDT  Workstation ID: [id number]    FL C Arm During Surgery  Result Date: 6/16/2025  This procedure was auto-finalized with no dictation required.    FL O Arm During Surgery  Result Date: 6/16/2025  This procedure was auto-finalized with no dictation required.                  Plan for Follow-up of Pending Labs/Results:   Pending Labs       Order Current Status    Blood  Culture - Blood, Arm, Left Preliminary result    Blood Culture - Blood, Arm, Right Preliminary result          Discharge Details        Discharge Medications        Continue These Medications        Instructions Start Date   allopurinol 100 MG tablet  Commonly known as: ZYLOPRIM   100 mg, Daily      amitriptyline 10 MG tablet  Commonly known as: ELAVIL   20 mg, Oral, Nightly      atorvastatin 20 MG tablet  Commonly known as: LIPITOR   20 mg, Nightly      donepezil 10 MG tablet  Commonly known as: Aricept   10 mg, Oral, Nightly      DULoxetine 60 MG capsule  Commonly known as: CYMBALTA   60 mg, Oral, Every Morning      hydroCHLOROthiazide 25 MG tablet   25 mg, Daily      lisinopril 20 MG tablet  Commonly known as: PRINIVIL,ZESTRIL   20 mg, 2 times daily      multivitamin with minerals tablet tablet   1 tablet, Daily      vitamin C 500 MG tablet  Commonly known as: ASCORBIC ACID   500 mg, Daily             Stop These Medications      oxyCODONE-acetaminophen 7.5-325 MG per tablet  Commonly known as: PERCOCET              No Known Allergies      Discharge Disposition:  Home or Self Care    Diet:  Hospital:  Diet Order   Procedures    Diet: Cardiac, Diabetic; Healthy Heart (2-3 Na+); Consistent Carbohydrate; Fluid Consistency: Thin (IDDSI 0)       Diet Instructions     - Regular diet             Activity:  Activity Instructions     - As directed by your physician and physical therapist    - No bending or twisting    - No driving until cleared by your doctor            Restrictions or Other Recommendations:  - none       CODE STATUS:    Code Status and Medical Interventions: CPR (Attempt to Resuscitate); Full Support   Ordered at: 06/23/25 2049     Code Status (Patient has no pulse and is not breathing):    CPR (Attempt to Resuscitate)     Medical Interventions (Patient has pulse or is breathing):    Full Support     Level Of Support Discussed With:    Patient       Future Appointments   Date Time Provider Department  [facility]   7/9/2025  1:30 PM Steph Alvarado PA-C [facility]   11/25/2025  2:00 PM Tiffany Garcia APRN [facility] Judd DO  06/25/25      Time Spent on Discharge:  I spent  35  minutes on this discharge activity which included: face-to-face encounter with the patient, reviewing the data in the system, coordination of the care with the nursing staff as well as consultants, documentation, and entering orders.

## 2025-06-25 NOTE — CASE MANAGEMENT/SOCIAL WORK
Continued Stay Note  [facility]     Patient Name: Bonilla Sterling Jr.  MRN: [id number]  Today's Date: 6/25/2025    Admit Date: 6/23/2025    Plan: home   Discharge Plan       Row Name 06/25/25 1040       Plan    Plan Comments Pt to dc home today. No dc needs at this time                   Discharge Codes    No documentation.                       Rosalva Dial RN

## 2025-06-25 NOTE — PLAN OF CARE
Goal Outcome Evaluation:  Plan of Care Reviewed With: patient        Progress: improving  Outcome Evaluation: Patient was able to mobilize out of bed and around trna. His HR was stable. He is back to his post surgery status and ready for home.    Anticipated Discharge Disposition (PT): home with assist

## 2025-06-28 LAB
BACTERIA SPEC AEROBE CULT: NORMAL
BACTERIA SPEC AEROBE CULT: NORMAL

## 2025-07-07 ENCOUNTER — HOSPITAL ENCOUNTER (OUTPATIENT)
Dept: GENERAL RADIOLOGY | Facility: HOSPITAL | Age: 65
Discharge: HOME | End: 2025-07-07
Admitting: NEUROLOGICAL SURGERY
Payer: MEDICARE

## 2025-07-07 DIAGNOSIS — M48.062 SPINAL STENOSIS, LUMBAR REGION, WITH NEUROGENIC CLAUDICATION: ICD-10-CM

## 2025-07-07 DIAGNOSIS — M51.9 LUMBAR DISC DISEASE: ICD-10-CM

## 2025-07-07 PROCEDURE — 72100 X-RAY EXAM L-S SPINE 2/3 VWS: CPT

## 2025-07-09 ENCOUNTER — OFFICE VISIT (OUTPATIENT)
Dept: NEUROSURGERY | Facility: CLINIC | Age: 65
End: 2025-07-09
Payer: MEDICARE

## 2025-07-09 VITALS — BODY MASS INDEX: 41.83 KG/M2 | WEIGHT: 282.4 LBS | HEIGHT: 69 IN | TEMPERATURE: 97.3 F

## 2025-07-09 DIAGNOSIS — Z98.1 S/P LUMBAR FUSION: ICD-10-CM

## 2025-07-09 DIAGNOSIS — M48.062 LUMBAR STENOSIS WITH NEUROGENIC CLAUDICATION: Primary | ICD-10-CM

## 2025-07-09 PROCEDURE — 99024 POSTOP FOLLOW-UP VISIT: CPT | Performed by: PHYSICIAN ASSISTANT

## 2025-07-09 RX ORDER — CETIRIZINE HYDROCHLORIDE 10 MG/1
10 TABLET ORAL DAILY
COMMUNITY

## 2025-07-09 NOTE — PROGRESS NOTES
Subjective     Chief Complaint: back pain with walking and standing intolerance    Patient ID: Bonilla Sterling Jr. is a 65 y.o. male is here today for follow-up.    Post-op Follow-up  Pain Control:  Controlled  Fever:  No fever  Diet:  Adequate intake  Activity:  Returning to normal  Operative Site Issues: Yes    Drainage:  None  Redness:  None  Swelling:  Improved  Operative site comments:  None  Additional information:  None    History of Present Illness:   Mr Sterling is a 65-year-old gentleman with progressive back pain with walking and standing intolerance. Studies demonstrated a spondylolisthesis at L4-5 with high-grade stenosis and in fact a complete block on the upright myelographic images. His facet complexes are oriented anterior to posterior putting him at risk for instability particularly in light of the substantial bony resection that will be necessary for decompression. As such, he presented on 6/16/25 for L4-5 decompression with fusion and stabilization.    He was discharged to home on postoperative day 3, 6/19  .  On 6/23/2025, he reported to the ER for anorexia, constipation, and intermittent visual hallucinations.  He was admitted.  Patient stated that he had used opioid pain medication and THC to help with his incisional pain which may have been part of his altered mental status.  He was treated with bowel regimen and continued physical therapy and Occupational Therapy.  His incisional pain had improved and he was able to discontinue opioid pain medication.  He was discharged back to home on 6/25/2025.  Today, he states that he is doing very well.  He is no longer having any abdominal issues and has not had a return of the visual hallucinations.  His incisional pain has been well-controlled.  He is walking more frequently and he is eager to return to some of his normal activities including lawnmowing boating etc.  He denies any fevers, chills or problems with his incision.  He does note some hip  "pain when he first stands and starts walking but otherwise is doing very well       The following portions of the patient's history were reviewed and updated as appropriate: allergies, current medications, past family history, past medical history, past social history, past surgical history and problem list.    Family history:   Family History   Problem Relation Age of Onset    Diabetes Mother     Hypertension Mother     Breast cancer Mother     Dementia Mother     Alzheimer's disease Mother     Arthritis Mother         Knee replacement    Hyperlipidemia Mother     Diabetes Father     Hypertension Father     Dementia Father     Alzheimer's disease Father     No Known Problems Sister     No Known Problems Sister     No Known Problems Sister     No Known Problems Sister     No Known Problems Sister     No Known Problems Brother     No Known Problems Brother     No Known Problems Brother     Colon cancer Neg Hx        Social history:   Social History     Socioeconomic History    Marital status:    Tobacco Use    Smoking status: Former     Current packs/day: 0.00     Average packs/day: 1 pack/day for 20.0 years (20.0 ttl pk-yrs)     Types: Cigarettes     Start date: 1989     Quit date:      Years since quittin.5     Passive exposure: Past    Smokeless tobacco: Never   Vaping Use    Vaping status: Never Used   Substance and Sexual Activity    Alcohol use: Not Currently     Comment: quit 10 years ago     Drug use: No    Sexual activity: Yes     Partners: Female       Review of Systems   Skin:  Positive for poor wound healing.     Objective   Temperature 97.3 °F (36.3 °C), temperature source Infrared, height 175.3 cm (69\"), weight 128 kg (282 lb 6.4 oz).  Body mass index is 41.7 kg/m².    Physical Exam  Constitutional:       Appearance: Normal appearance.   Eyes:      Pupils: Pupils are equal, round, and reactive to light.   Cardiovascular:      Pulses: Normal pulses.   ____Pulmonary:      Effort: " Pulmonary effort is normal.      Breath sounds: Normal breath sounds.   Musculoskeletal:      Comments: Gait slow and mildly antalgic, but steady and independent with no foot drop.    Skin:     Comments: Incision clean, dry, and intact with no swelling, tenderness, or erythema.  Has healed well  _Neurological:      General: No focal deficit present.      Mental Status:   alert and oriented to person, place, and time.   Psychiatric:         Mood and Affect: Mood normal.         Behavior: Behavior normal.    Assessment & Plan     Independent Review of Radiographic Studies:    AP and lateral x-rays of the lumbar spine were reviewed with the patient.  They show good placement of the fusion hardware with no complication.  The patient's questions were answered regarding the images and procedure    Medical Decision Making:    Mr. Sterling is doing well.  He may drive.  He may mow his lawn.  He may use his boat but is cautioned against high speeds with bumps.  He asks about offloading/4 wheeling and we recommend that he wait at least 6 more weeks before starting that kind of activity.  We will plan to see him back in the office in about 4 months with new x-rays of the lumbar spine to assess his fusion progress.  He is encouraged to call the office in the interim if he has any questions or concerns    Diagnoses and all orders for this visit:    1. Lumbar stenosis with neurogenic claudication (Primary)  -     XR Spine Lumbar 2 or 3 View; Future    2. S/P lumbar fusion  -     XR Spine Lumbar 2 or 3 View; Future      Return in about 4 months (around 11/9/2025).       This document signed by Steph Alvarado PA-C  July 9, 2025 16:39 EDT

## (undated) DEVICE — SUT VIC PLS CTD ANTIB BR 3/0 8/18IN 45CM

## (undated) DEVICE — VLV SXN AIR/H2O ORCAPOD3 1P/U STRL

## (undated) DEVICE — STRIP,CLOSURE,WOUND,MEDI-STRIP,1/2X4: Brand: MEDLINE

## (undated) DEVICE — DRP C/ARMOR

## (undated) DEVICE — Device

## (undated) DEVICE — LUBE JELLY PK/2.75GM STRL BX/144

## (undated) DEVICE — SYR LUERLOK 30CC

## (undated) DEVICE — SUT SILK 2/0 PS 18IN 1588H

## (undated) DEVICE — GLV SURG PREMIERPRO MIC LTX PF SZ6.5 BRN

## (undated) DEVICE — JACKSON TABLE POSITIONER KIT: Brand: MEDLINE INDUSTRIES, INC.

## (undated) DEVICE — PK NEURO DISC 10

## (undated) DEVICE — ENDOSCOPY PORT CONNECTOR FOR OLYMPUS® SCOPES: Brand: ERBE

## (undated) DEVICE — STRAP POSTN KN/BDY FM 5X72IN DISP

## (undated) DEVICE — ANTIBACTERIAL UNDYED BRAIDED (POLYGLACTIN 910), SYNTHETIC ABSORBABLE SUTURE: Brand: COATED VICRYL

## (undated) DEVICE — BOWL ASSY BM210 DUAL BLADE DISPOSABLE: Brand: MIDAS REX™

## (undated) DEVICE — SNAP KOVER: Brand: UNBRANDED

## (undated) DEVICE — KT DRN EVAC WND PVC PCH WTROC RND 10F400

## (undated) DEVICE — SHEET,DRAPE,40X58,STERILE: Brand: MEDLINE

## (undated) DEVICE — PAD GRND REM POLYHESIVE A/ DISP

## (undated) DEVICE — GLV SURG PREMIERPRO MIC LTX PF SZ7 BRN

## (undated) DEVICE — HYBRID TUBING/CAP SET FOR OLYMPUS® SCOPES: Brand: ERBE

## (undated) DEVICE — TOOL MR8-14MH30T MR8 14CM MH SYMTRI 3MM: Brand: MIDAS REX MR8

## (undated) DEVICE — PACK,UNIVERSAL,NO GOWNS: Brand: MEDLINE

## (undated) DEVICE — BLANKT WARM UPPR/BDY ARM/OUT 57X196CM

## (undated) DEVICE — PATIENT RETURN ELECTRODE, SINGLE-USE, CONTACT QUALITY MONITORING, ADULT, WITH 9FT CORD, FOR PATIENTS WEIGING OVER 33LBS. (15KG): Brand: MEGADYNE

## (undated) DEVICE — TRAP,MUCUS SPECIMEN,40CC: Brand: MEDLINE

## (undated) DEVICE — IRRIGATOR BULB ASEPTO 60CC STRL

## (undated) DEVICE — DRSNG WND GZ PAD BORDERED 4X8IN STRL

## (undated) DEVICE — GLV SURG PREMIERPRO MIC LTX PF SZ7.5 BRN

## (undated) DEVICE — CONN TBG Y 5 IN 1 LF STRL

## (undated) DEVICE — SUT VIC 0 CTD BR 18IN UNDYE VCP724D

## (undated) DEVICE — SPHR MARKR STEALTH STATION

## (undated) DEVICE — GOWN,SIRUS,POLYRNF,BRTHSLV,XL,30/CS: Brand: MEDLINE